# Patient Record
Sex: MALE | Race: WHITE | Employment: FULL TIME | URBAN - METROPOLITAN AREA
[De-identification: names, ages, dates, MRNs, and addresses within clinical notes are randomized per-mention and may not be internally consistent; named-entity substitution may affect disease eponyms.]

---

## 2017-01-06 RX ORDER — DIAZEPAM 10 MG/1
TABLET ORAL
Qty: 30 TABLET | Refills: 0 | OUTPATIENT
Start: 2017-01-06 | End: 2017-02-02 | Stop reason: SDUPTHER

## 2017-01-06 RX ORDER — IBUPROFEN 600 MG/1
TABLET ORAL
Qty: 120 TABLET | Refills: 0 | Status: SHIPPED | OUTPATIENT
Start: 2017-01-06 | End: 2017-02-15 | Stop reason: SDUPTHER

## 2017-02-03 RX ORDER — DIAZEPAM 10 MG/1
TABLET ORAL
Qty: 30 TABLET | Refills: 0 | OUTPATIENT
Start: 2017-02-03 | End: 2017-02-28 | Stop reason: SDUPTHER

## 2017-02-15 RX ORDER — IBUPROFEN 600 MG/1
TABLET ORAL
Qty: 120 TABLET | Refills: 0 | Status: SHIPPED | OUTPATIENT
Start: 2017-02-15 | End: 2017-04-03 | Stop reason: SDUPTHER

## 2017-02-28 RX ORDER — DIAZEPAM 10 MG/1
TABLET ORAL
Qty: 30 TABLET | Refills: 0 | OUTPATIENT
Start: 2017-02-28 | End: 2017-03-24 | Stop reason: SDUPTHER

## 2017-03-21 ENCOUNTER — OFFICE VISIT (OUTPATIENT)
Dept: INTERNAL MEDICINE | Age: 62
End: 2017-03-21

## 2017-03-21 VITALS
WEIGHT: 213 LBS | HEART RATE: 76 BPM | BODY MASS INDEX: 29.71 KG/M2 | DIASTOLIC BLOOD PRESSURE: 70 MMHG | SYSTOLIC BLOOD PRESSURE: 124 MMHG | RESPIRATION RATE: 16 BRPM

## 2017-03-21 DIAGNOSIS — Z79.4 TYPE 2 DIABETES MELLITUS WITHOUT COMPLICATION, WITH LONG-TERM CURRENT USE OF INSULIN (HCC): ICD-10-CM

## 2017-03-21 DIAGNOSIS — E78.00 HYPERCHOLESTEROLEMIA: ICD-10-CM

## 2017-03-21 DIAGNOSIS — I10 ESSENTIAL HYPERTENSION: Primary | ICD-10-CM

## 2017-03-21 DIAGNOSIS — E11.9 TYPE 2 DIABETES MELLITUS WITHOUT COMPLICATION, WITH LONG-TERM CURRENT USE OF INSULIN (HCC): ICD-10-CM

## 2017-03-21 DIAGNOSIS — L57.0 AK (ACTINIC KERATOSIS): ICD-10-CM

## 2017-03-21 DIAGNOSIS — Z00.00 PREVENTATIVE HEALTH CARE: ICD-10-CM

## 2017-03-21 LAB
A/G RATIO: 1.7 (ref 1.1–2.2)
ALBUMIN SERPL-MCNC: 4.5 G/DL (ref 3.4–5)
ALP BLD-CCNC: 92 U/L (ref 40–129)
ALT SERPL-CCNC: 26 U/L (ref 10–40)
ANION GAP SERPL CALCULATED.3IONS-SCNC: 19 MMOL/L (ref 3–16)
AST SERPL-CCNC: 25 U/L (ref 15–37)
BILIRUB SERPL-MCNC: 0.5 MG/DL (ref 0–1)
BUN BLDV-MCNC: 14 MG/DL (ref 7–20)
CALCIUM SERPL-MCNC: 10.1 MG/DL (ref 8.3–10.6)
CHLORIDE BLD-SCNC: 100 MMOL/L (ref 99–110)
CHOLESTEROL, TOTAL: 137 MG/DL (ref 0–199)
CO2: 23 MMOL/L (ref 21–32)
CREAT SERPL-MCNC: 0.8 MG/DL (ref 0.8–1.3)
GFR AFRICAN AMERICAN: >60
GFR NON-AFRICAN AMERICAN: >60
GLOBULIN: 2.7 G/DL
GLUCOSE BLD-MCNC: 87 MG/DL (ref 70–99)
HCT VFR BLD CALC: 47 % (ref 40.5–52.5)
HDLC SERPL-MCNC: 40 MG/DL (ref 40–60)
HEMOGLOBIN: 16.4 G/DL (ref 13.5–17.5)
HEPATITIS C ANTIBODY INTERPRETATION: NORMAL
LDL CHOLESTEROL CALCULATED: 44 MG/DL
MCH RBC QN AUTO: 31.9 PG (ref 26–34)
MCHC RBC AUTO-ENTMCNC: 35 G/DL (ref 31–36)
MCV RBC AUTO: 91.2 FL (ref 80–100)
PDW BLD-RTO: 13.4 % (ref 12.4–15.4)
PLATELET # BLD: 310 K/UL (ref 135–450)
PMV BLD AUTO: 8.2 FL (ref 5–10.5)
POTASSIUM SERPL-SCNC: 4.5 MMOL/L (ref 3.5–5.1)
RBC # BLD: 5.15 M/UL (ref 4.2–5.9)
SODIUM BLD-SCNC: 142 MMOL/L (ref 136–145)
TOTAL PROTEIN: 7.2 G/DL (ref 6.4–8.2)
TRIGL SERPL-MCNC: 263 MG/DL (ref 0–150)
VLDLC SERPL CALC-MCNC: 53 MG/DL
WBC # BLD: 9.1 K/UL (ref 4–11)

## 2017-03-21 PROCEDURE — 17000 DESTRUCT PREMALG LESION: CPT | Performed by: INTERNAL MEDICINE

## 2017-03-21 PROCEDURE — 99214 OFFICE O/P EST MOD 30 MIN: CPT | Performed by: INTERNAL MEDICINE

## 2017-03-21 PROCEDURE — 36415 COLL VENOUS BLD VENIPUNCTURE: CPT | Performed by: INTERNAL MEDICINE

## 2017-03-21 ASSESSMENT — ENCOUNTER SYMPTOMS
WHEEZING: 0
SORE THROAT: 0
GASTROINTESTINAL NEGATIVE: 1
RHINORRHEA: 0

## 2017-03-22 LAB
ESTIMATED AVERAGE GLUCOSE: 168.6 MG/DL
HBA1C MFR BLD: 7.5 %

## 2017-03-24 RX ORDER — DIAZEPAM 10 MG/1
TABLET ORAL
Qty: 30 TABLET | Refills: 0 | OUTPATIENT
Start: 2017-03-24 | End: 2017-04-21 | Stop reason: SDUPTHER

## 2017-04-04 RX ORDER — IBUPROFEN 600 MG/1
TABLET ORAL
Qty: 120 TABLET | Refills: 0 | Status: SHIPPED | OUTPATIENT
Start: 2017-04-04 | End: 2017-05-19 | Stop reason: SDUPTHER

## 2017-04-21 RX ORDER — DIAZEPAM 10 MG/1
TABLET ORAL
Qty: 30 TABLET | Refills: 0 | Status: SHIPPED | OUTPATIENT
Start: 2017-04-21 | End: 2017-05-19 | Stop reason: SDUPTHER

## 2017-04-25 RX ORDER — LISINOPRIL 20 MG/1
TABLET ORAL
Qty: 30 TABLET | Refills: 5 | Status: SHIPPED | OUTPATIENT
Start: 2017-04-25 | End: 2017-09-19 | Stop reason: SDUPTHER

## 2017-05-19 RX ORDER — DIAZEPAM 10 MG/1
TABLET ORAL
Qty: 30 TABLET | Refills: 5 | OUTPATIENT
Start: 2017-05-19 | End: 2017-09-19 | Stop reason: SDUPTHER

## 2017-05-19 RX ORDER — IBUPROFEN 600 MG/1
TABLET ORAL
Qty: 120 TABLET | Refills: 0 | Status: SHIPPED | OUTPATIENT
Start: 2017-05-19 | End: 2017-07-03 | Stop reason: SDUPTHER

## 2017-07-20 RX ORDER — ATORVASTATIN CALCIUM 40 MG/1
TABLET, FILM COATED ORAL
Qty: 30 TABLET | Refills: 0 | Status: SHIPPED | OUTPATIENT
Start: 2017-07-20 | End: 2017-08-21 | Stop reason: SDUPTHER

## 2017-08-04 RX ORDER — FENOFIBRIC ACID 135 MG/1
135 CAPSULE, DELAYED RELEASE ORAL DAILY
Qty: 30 CAPSULE | Refills: 0 | Status: SHIPPED | OUTPATIENT
Start: 2017-08-04 | End: 2017-09-05 | Stop reason: SDUPTHER

## 2017-09-19 ENCOUNTER — OFFICE VISIT (OUTPATIENT)
Dept: INTERNAL MEDICINE | Age: 62
End: 2017-09-19

## 2017-09-19 VITALS
SYSTOLIC BLOOD PRESSURE: 118 MMHG | WEIGHT: 212 LBS | HEART RATE: 96 BPM | DIASTOLIC BLOOD PRESSURE: 74 MMHG | BODY MASS INDEX: 29.57 KG/M2 | RESPIRATION RATE: 16 BRPM

## 2017-09-19 DIAGNOSIS — E78.00 HYPERCHOLESTEROLEMIA: ICD-10-CM

## 2017-09-19 DIAGNOSIS — Z79.4 TYPE 2 DIABETES MELLITUS WITHOUT COMPLICATION, WITH LONG-TERM CURRENT USE OF INSULIN (HCC): Primary | ICD-10-CM

## 2017-09-19 DIAGNOSIS — Z23 NEED FOR TD VACCINE: ICD-10-CM

## 2017-09-19 DIAGNOSIS — E11.9 TYPE 2 DIABETES MELLITUS WITHOUT COMPLICATION, WITH LONG-TERM CURRENT USE OF INSULIN (HCC): Primary | ICD-10-CM

## 2017-09-19 DIAGNOSIS — I10 ESSENTIAL HYPERTENSION: ICD-10-CM

## 2017-09-19 LAB
A/G RATIO: 1.4 (ref 1.1–2.2)
ALBUMIN SERPL-MCNC: 4.6 G/DL (ref 3.4–5)
ALP BLD-CCNC: 90 U/L (ref 40–129)
ALT SERPL-CCNC: 34 U/L (ref 10–40)
ANION GAP SERPL CALCULATED.3IONS-SCNC: 23 MMOL/L (ref 3–16)
AST SERPL-CCNC: 34 U/L (ref 15–37)
BILIRUB SERPL-MCNC: 0.5 MG/DL (ref 0–1)
BUN BLDV-MCNC: 14 MG/DL (ref 7–20)
CALCIUM SERPL-MCNC: 10.3 MG/DL (ref 8.3–10.6)
CHLORIDE BLD-SCNC: 98 MMOL/L (ref 99–110)
CHOLESTEROL, TOTAL: 128 MG/DL (ref 0–199)
CO2: 19 MMOL/L (ref 21–32)
CREAT SERPL-MCNC: 0.8 MG/DL (ref 0.8–1.3)
ESTIMATED AVERAGE GLUCOSE: 191.5 MG/DL
GFR AFRICAN AMERICAN: >60
GFR NON-AFRICAN AMERICAN: >60
GLOBULIN: 3.3 G/DL
GLUCOSE BLD-MCNC: 169 MG/DL (ref 70–99)
HBA1C MFR BLD: 8.3 %
HDLC SERPL-MCNC: 37 MG/DL (ref 40–60)
LDL CHOLESTEROL CALCULATED: ABNORMAL MG/DL
LDL CHOLESTEROL DIRECT: 55 MG/DL
POTASSIUM SERPL-SCNC: 5 MMOL/L (ref 3.5–5.1)
SODIUM BLD-SCNC: 140 MMOL/L (ref 136–145)
TOTAL PROTEIN: 7.9 G/DL (ref 6.4–8.2)
TRIGL SERPL-MCNC: 305 MG/DL (ref 0–150)
VLDLC SERPL CALC-MCNC: ABNORMAL MG/DL

## 2017-09-19 PROCEDURE — 90714 TD VACC NO PRESV 7 YRS+ IM: CPT | Performed by: INTERNAL MEDICINE

## 2017-09-19 PROCEDURE — 99214 OFFICE O/P EST MOD 30 MIN: CPT | Performed by: INTERNAL MEDICINE

## 2017-09-19 PROCEDURE — 90471 IMMUNIZATION ADMIN: CPT | Performed by: INTERNAL MEDICINE

## 2017-09-19 PROCEDURE — 36415 COLL VENOUS BLD VENIPUNCTURE: CPT | Performed by: INTERNAL MEDICINE

## 2017-09-19 RX ORDER — LISINOPRIL 20 MG/1
TABLET ORAL
Qty: 90 TABLET | Refills: 0 | Status: SHIPPED | OUTPATIENT
Start: 2017-09-19 | End: 2018-01-22 | Stop reason: SDUPTHER

## 2017-09-19 RX ORDER — ATORVASTATIN CALCIUM 40 MG/1
TABLET, FILM COATED ORAL
Qty: 90 TABLET | Refills: 0 | Status: SHIPPED | OUTPATIENT
Start: 2017-09-19 | End: 2018-03-26 | Stop reason: SDUPTHER

## 2017-09-19 RX ORDER — DIAZEPAM 10 MG/1
TABLET ORAL
Qty: 90 TABLET | Refills: 0 | Status: SHIPPED | OUTPATIENT
Start: 2017-09-19 | End: 2017-11-16 | Stop reason: SDUPTHER

## 2017-09-19 RX ORDER — VARDENAFIL HYDROCHLORIDE 20 MG/1
TABLET ORAL
Qty: 10 TABLET | Refills: 0 | Status: SHIPPED | OUTPATIENT
Start: 2017-09-19 | End: 2018-02-21 | Stop reason: SDUPTHER

## 2017-09-19 RX ORDER — FLUOXETINE 10 MG/1
CAPSULE ORAL
Qty: 270 CAPSULE | Refills: 0 | Status: SHIPPED | OUTPATIENT
Start: 2017-09-19 | End: 2018-02-21 | Stop reason: SDUPTHER

## 2017-09-19 RX ORDER — BUPROPION HYDROCHLORIDE 300 MG/1
300 TABLET ORAL EVERY MORNING
Qty: 90 TABLET | Refills: 0 | Status: SHIPPED | OUTPATIENT
Start: 2017-09-19 | End: 2018-05-24

## 2017-09-19 RX ORDER — FENOFIBRIC ACID 135 MG/1
CAPSULE, DELAYED RELEASE ORAL
Qty: 90 CAPSULE | Refills: 0 | Status: SHIPPED | OUTPATIENT
Start: 2017-09-19 | End: 2018-02-05 | Stop reason: SDUPTHER

## 2017-09-19 ASSESSMENT — ENCOUNTER SYMPTOMS
WHEEZING: 0
RHINORRHEA: 0
GASTROINTESTINAL NEGATIVE: 1
SORE THROAT: 0

## 2017-09-19 ASSESSMENT — PATIENT HEALTH QUESTIONNAIRE - PHQ9
2. FEELING DOWN, DEPRESSED OR HOPELESS: 0
SUM OF ALL RESPONSES TO PHQ QUESTIONS 1-9: 0
SUM OF ALL RESPONSES TO PHQ9 QUESTIONS 1 & 2: 0
1. LITTLE INTEREST OR PLEASURE IN DOING THINGS: 0

## 2017-11-15 ENCOUNTER — OFFICE VISIT (OUTPATIENT)
Dept: INTERNAL MEDICINE | Age: 62
End: 2017-11-15

## 2017-11-15 VITALS
WEIGHT: 209 LBS | OXYGEN SATURATION: 98 % | HEART RATE: 110 BPM | SYSTOLIC BLOOD PRESSURE: 130 MMHG | DIASTOLIC BLOOD PRESSURE: 72 MMHG | BODY MASS INDEX: 28.31 KG/M2 | HEIGHT: 72 IN

## 2017-11-15 DIAGNOSIS — F33.1 MODERATE EPISODE OF RECURRENT MAJOR DEPRESSIVE DISORDER (HCC): ICD-10-CM

## 2017-11-15 DIAGNOSIS — E11.9 TYPE 2 DIABETES MELLITUS WITHOUT COMPLICATION, WITH LONG-TERM CURRENT USE OF INSULIN (HCC): Primary | ICD-10-CM

## 2017-11-15 DIAGNOSIS — Z79.4 TYPE 2 DIABETES MELLITUS WITHOUT COMPLICATION, WITH LONG-TERM CURRENT USE OF INSULIN (HCC): Primary | ICD-10-CM

## 2017-11-15 DIAGNOSIS — E78.00 HYPERCHOLESTEROLEMIA: ICD-10-CM

## 2017-11-15 DIAGNOSIS — F41.1 ANXIETY STATE: ICD-10-CM

## 2017-11-15 DIAGNOSIS — Z72.0 TOBACCO ABUSE: ICD-10-CM

## 2017-11-15 PROBLEM — L57.0 AK (ACTINIC KERATOSIS): Status: RESOLVED | Noted: 2017-03-21 | Resolved: 2017-11-15

## 2017-11-15 PROCEDURE — 99214 OFFICE O/P EST MOD 30 MIN: CPT | Performed by: INTERNAL MEDICINE

## 2017-11-15 PROCEDURE — G8484 FLU IMMUNIZE NO ADMIN: HCPCS | Performed by: INTERNAL MEDICINE

## 2017-11-15 PROCEDURE — G8427 DOCREV CUR MEDS BY ELIG CLIN: HCPCS | Performed by: INTERNAL MEDICINE

## 2017-11-15 PROCEDURE — 3017F COLORECTAL CA SCREEN DOC REV: CPT | Performed by: INTERNAL MEDICINE

## 2017-11-15 PROCEDURE — 3045F PR MOST RECENT HEMOGLOBIN A1C LEVEL 7.0-9.0%: CPT | Performed by: INTERNAL MEDICINE

## 2017-11-15 PROCEDURE — 4004F PT TOBACCO SCREEN RCVD TLK: CPT | Performed by: INTERNAL MEDICINE

## 2017-11-15 PROCEDURE — G8419 CALC BMI OUT NRM PARAM NOF/U: HCPCS | Performed by: INTERNAL MEDICINE

## 2017-11-15 RX ORDER — IBUPROFEN 600 MG/1
600 TABLET ORAL EVERY 6 HOURS PRN
Qty: 120 TABLET | Refills: 1 | Status: SHIPPED | OUTPATIENT
Start: 2017-11-15 | End: 2018-02-05 | Stop reason: SDUPTHER

## 2017-11-15 ASSESSMENT — ENCOUNTER SYMPTOMS
RESPIRATORY NEGATIVE: 1
WHEEZING: 0
GASTROINTESTINAL NEGATIVE: 1
CHEST TIGHTNESS: 0
SHORTNESS OF BREATH: 0

## 2017-11-15 NOTE — PROGRESS NOTES
Subjective:      Patient ID: Jared Kam is a 60 y.o. y.o. male. HPI    Patient is here for a new patient visit    Past Medical History:   Diagnosis Date    Anal cancer ([facility]     Anxiety     Depression     Hyperlipidemia     Lumbar disc disorder     Herniation    Type 2 diabetes mellitus without complication ([facility] [facility]        Past Surgical History:   Procedure Laterality Date    COLONOSCOPY  1/2012    rectal cancer and hemorrhoidectomy     COLONOSCOPY  10/30/2015    Dr. Bishop Gone, Polyps, mild diverticulosis, hemorrhoids, colon polyps Transverse, descending- 3 Years    HAND SURGERY  1972    Industrial accident   [address] [place] [address]       Social History     Social History    Marital status:      Spouse name: Juli    Number of children: 3    Years of education: 16     Occupational History          Social History Main Topics    Smoking status: Current Every Day Smoker     Packs/day: 1.00     Years: 38.00     Types: Cigarettes    Smokeless tobacco: Never Used    Alcohol use 0.0 oz/week      Comment: Occasional beer    Drug use: No    Sexual activity: Yes     Partners: Female     Other Topics Concern    Not on file     Social History Narrative    No narrative on file       Family History   Problem Relation Age of Onset    Adopted: Yes       Vitals:    11/15/17 1411   BP: 130/72   Pulse: 110   SpO2: 98%       Wt Readings from Last 3 Encounters:   11/15/17 209 lb (94.8 kg)   09/19/17 212 lb (96.2 kg)   03/21/17 213 lb (96.6 kg)     He has not been checking his blood sugar. Patient is taking their cholesterol medication and watching diet without problem. He has chronic back pain. He takes Motrin for it prn. He takes Prozac and Wellbutrin for anxiety and depression. He takes Valium daily. Review of Systems   Constitutional: Negative. HENT: Negative. Respiratory: Negative.   Negative for chest tightness, shortness of breath and

## 2017-11-16 ENCOUNTER — TELEPHONE (OUTPATIENT)
Dept: INTERNAL MEDICINE | Age: 62
End: 2017-11-16

## 2017-11-16 RX ORDER — DIAZEPAM 10 MG/1
10 TABLET ORAL DAILY PRN
Qty: 30 TABLET | Refills: 2 | Status: SHIPPED | OUTPATIENT
Start: 2017-11-16 | End: 2018-02-05 | Stop reason: SDUPTHER

## 2017-11-24 DIAGNOSIS — E11.9 TYPE 2 DIABETES MELLITUS WITHOUT COMPLICATION, WITH LONG-TERM CURRENT USE OF INSULIN (HCC): ICD-10-CM

## 2017-11-24 DIAGNOSIS — E78.00 HYPERCHOLESTEROLEMIA: ICD-10-CM

## 2017-11-24 DIAGNOSIS — Z79.4 TYPE 2 DIABETES MELLITUS WITHOUT COMPLICATION, WITH LONG-TERM CURRENT USE OF INSULIN (HCC): ICD-10-CM

## 2017-11-24 LAB
A/G RATIO: 1.4 (ref 1.1–2.2)
ALBUMIN SERPL-MCNC: 4.3 G/DL (ref 3.4–5)
ALP BLD-CCNC: 87 U/L (ref 40–129)
ALT SERPL-CCNC: 34 U/L (ref 10–40)
ANION GAP SERPL CALCULATED.3IONS-SCNC: 16 MMOL/L (ref 3–16)
AST SERPL-CCNC: 35 U/L (ref 15–37)
BASOPHILS ABSOLUTE: 0 K/UL (ref 0–0.2)
BASOPHILS RELATIVE PERCENT: 0.4 %
BILIRUB SERPL-MCNC: 0.6 MG/DL (ref 0–1)
BUN BLDV-MCNC: 13 MG/DL (ref 7–20)
CALCIUM SERPL-MCNC: 9.9 MG/DL (ref 8.3–10.6)
CHLORIDE BLD-SCNC: 98 MMOL/L (ref 99–110)
CHOLESTEROL, TOTAL: 140 MG/DL (ref 0–199)
CO2: 23 MMOL/L (ref 21–32)
CREAT SERPL-MCNC: 0.8 MG/DL (ref 0.8–1.3)
EOSINOPHILS ABSOLUTE: 0.3 K/UL (ref 0–0.6)
EOSINOPHILS RELATIVE PERCENT: 3.6 %
GFR AFRICAN AMERICAN: >60
GFR NON-AFRICAN AMERICAN: >60
GLOBULIN: 3 G/DL
GLUCOSE BLD-MCNC: 172 MG/DL (ref 70–99)
HCT VFR BLD CALC: 48.2 % (ref 40.5–52.5)
HDLC SERPL-MCNC: 38 MG/DL (ref 40–60)
HEMOGLOBIN: 16.8 G/DL (ref 13.5–17.5)
LDL CHOLESTEROL CALCULATED: 48 MG/DL
LYMPHOCYTES ABSOLUTE: 1.6 K/UL (ref 1–5.1)
LYMPHOCYTES RELATIVE PERCENT: 21.1 %
MCH RBC QN AUTO: 32.4 PG (ref 26–34)
MCHC RBC AUTO-ENTMCNC: 34.7 G/DL (ref 31–36)
MCV RBC AUTO: 93.2 FL (ref 80–100)
MONOCYTES ABSOLUTE: 0.7 K/UL (ref 0–1.3)
MONOCYTES RELATIVE PERCENT: 10.1 %
NEUTROPHILS ABSOLUTE: 4.8 K/UL (ref 1.7–7.7)
NEUTROPHILS RELATIVE PERCENT: 64.8 %
PDW BLD-RTO: 13 % (ref 12.4–15.4)
PLATELET # BLD: 284 K/UL (ref 135–450)
PMV BLD AUTO: 7.9 FL (ref 5–10.5)
POTASSIUM SERPL-SCNC: 4.6 MMOL/L (ref 3.5–5.1)
RBC # BLD: 5.17 M/UL (ref 4.2–5.9)
SODIUM BLD-SCNC: 137 MMOL/L (ref 136–145)
TOTAL PROTEIN: 7.3 G/DL (ref 6.4–8.2)
TRIGL SERPL-MCNC: 271 MG/DL (ref 0–150)
VLDLC SERPL CALC-MCNC: 54 MG/DL
WBC # BLD: 7.4 K/UL (ref 4–11)

## 2017-11-25 LAB
ESTIMATED AVERAGE GLUCOSE: 200.1 MG/DL
HBA1C MFR BLD: 8.6 %

## 2017-12-01 ENCOUNTER — TELEPHONE (OUTPATIENT)
Dept: INTERNAL MEDICINE | Age: 62
End: 2017-12-01

## 2018-01-22 RX ORDER — LANCETS 30 GAUGE
EACH MISCELLANEOUS
Qty: 100 EACH | Refills: 3 | Status: SHIPPED | OUTPATIENT
Start: 2018-01-22

## 2018-01-22 RX ORDER — LISINOPRIL 20 MG/1
TABLET ORAL
Qty: 90 TABLET | Refills: 0 | Status: SHIPPED | OUTPATIENT
Start: 2018-01-22 | End: 2018-04-27 | Stop reason: SDUPTHER

## 2018-02-05 RX ORDER — DIAZEPAM 10 MG/1
10 TABLET ORAL DAILY PRN
Qty: 30 TABLET | Refills: 0 | Status: SHIPPED | OUTPATIENT
Start: 2018-02-05 | End: 2018-03-07 | Stop reason: SDUPTHER

## 2018-02-05 RX ORDER — IBUPROFEN 600 MG/1
600 TABLET ORAL EVERY 6 HOURS PRN
Qty: 120 TABLET | Refills: 1 | Status: SHIPPED | OUTPATIENT
Start: 2018-02-05 | End: 2018-05-04 | Stop reason: SDUPTHER

## 2018-02-05 RX ORDER — FENOFIBRIC ACID 135 MG/1
CAPSULE, DELAYED RELEASE ORAL
Qty: 90 CAPSULE | Refills: 0 | Status: SHIPPED | OUTPATIENT
Start: 2018-02-05 | End: 2018-05-04 | Stop reason: SDUPTHER

## 2018-02-14 ENCOUNTER — TELEPHONE (OUTPATIENT)
Dept: INTERNAL MEDICINE | Age: 63
End: 2018-02-14

## 2018-02-14 DIAGNOSIS — N18.2 DIABETES MELLITUS DUE TO UNDERLYING CONDITION WITH STAGE 2 CHRONIC KIDNEY DISEASE, WITHOUT LONG-TERM CURRENT USE OF INSULIN (HCC): Primary | ICD-10-CM

## 2018-02-14 DIAGNOSIS — E08.22 DIABETES MELLITUS DUE TO UNDERLYING CONDITION WITH STAGE 2 CHRONIC KIDNEY DISEASE, WITHOUT LONG-TERM CURRENT USE OF INSULIN (HCC): Primary | ICD-10-CM

## 2018-02-21 ENCOUNTER — OFFICE VISIT (OUTPATIENT)
Dept: INTERNAL MEDICINE | Age: 63
End: 2018-02-21

## 2018-02-21 VITALS
HEIGHT: 72 IN | SYSTOLIC BLOOD PRESSURE: 130 MMHG | DIASTOLIC BLOOD PRESSURE: 74 MMHG | OXYGEN SATURATION: 98 % | HEART RATE: 80 BPM | BODY MASS INDEX: 28.17 KG/M2 | WEIGHT: 208 LBS

## 2018-02-21 DIAGNOSIS — E11.9 TYPE 2 DIABETES MELLITUS WITHOUT COMPLICATION, WITH LONG-TERM CURRENT USE OF INSULIN (HCC): Primary | ICD-10-CM

## 2018-02-21 DIAGNOSIS — E11.9 TYPE 2 DIABETES MELLITUS WITHOUT COMPLICATION, WITH LONG-TERM CURRENT USE OF INSULIN (HCC): ICD-10-CM

## 2018-02-21 DIAGNOSIS — Z79.4 TYPE 2 DIABETES MELLITUS WITHOUT COMPLICATION, WITH LONG-TERM CURRENT USE OF INSULIN (HCC): Primary | ICD-10-CM

## 2018-02-21 DIAGNOSIS — I10 ESSENTIAL HYPERTENSION: ICD-10-CM

## 2018-02-21 DIAGNOSIS — Z79.4 TYPE 2 DIABETES MELLITUS WITHOUT COMPLICATION, WITH LONG-TERM CURRENT USE OF INSULIN (HCC): ICD-10-CM

## 2018-02-21 DIAGNOSIS — F41.1 ANXIETY STATE: ICD-10-CM

## 2018-02-21 DIAGNOSIS — E78.00 HYPERCHOLESTEROLEMIA: ICD-10-CM

## 2018-02-21 DIAGNOSIS — F33.1 MODERATE EPISODE OF RECURRENT MAJOR DEPRESSIVE DISORDER (HCC): ICD-10-CM

## 2018-02-21 DIAGNOSIS — Z72.89 OTHER PROBLEMS RELATED TO LIFESTYLE: ICD-10-CM

## 2018-02-21 LAB
A/G RATIO: 1.8 (ref 1.1–2.2)
ALBUMIN SERPL-MCNC: 5.1 G/DL (ref 3.4–5)
ALP BLD-CCNC: 85 U/L (ref 40–129)
ALT SERPL-CCNC: 28 U/L (ref 10–40)
ANION GAP SERPL CALCULATED.3IONS-SCNC: 17 MMOL/L (ref 3–16)
AST SERPL-CCNC: 25 U/L (ref 15–37)
BILIRUB SERPL-MCNC: 0.6 MG/DL (ref 0–1)
BILIRUBIN URINE: NEGATIVE
BLOOD, URINE: NEGATIVE
BUN BLDV-MCNC: 14 MG/DL (ref 7–20)
CALCIUM SERPL-MCNC: 10.4 MG/DL (ref 8.3–10.6)
CHLORIDE BLD-SCNC: 99 MMOL/L (ref 99–110)
CHOLESTEROL, TOTAL: 144 MG/DL (ref 0–199)
CLARITY: CLEAR
CO2: 24 MMOL/L (ref 21–32)
COLOR: YELLOW
CREAT SERPL-MCNC: 0.9 MG/DL (ref 0.8–1.3)
EPITHELIAL CELLS, UA: 1 /HPF (ref 0–5)
GFR AFRICAN AMERICAN: >60
GFR NON-AFRICAN AMERICAN: >60
GLOBULIN: 2.8 G/DL
GLUCOSE BLD-MCNC: 114 MG/DL (ref 70–99)
GLUCOSE URINE: NEGATIVE MG/DL
HDLC SERPL-MCNC: 39 MG/DL (ref 40–60)
HYALINE CASTS: 1 /LPF (ref 0–8)
KETONES, URINE: NEGATIVE MG/DL
LDL CHOLESTEROL CALCULATED: 51 MG/DL
LEUKOCYTE ESTERASE, URINE: NEGATIVE
MICROSCOPIC EXAMINATION: NORMAL
NITRITE, URINE: NEGATIVE
PH UA: 6
POTASSIUM SERPL-SCNC: 4.6 MMOL/L (ref 3.5–5.1)
PROTEIN UA: NEGATIVE MG/DL
RBC UA: 3 /HPF (ref 0–4)
REASON FOR REJECTION: NORMAL
REJECTED TEST: NORMAL
SODIUM BLD-SCNC: 140 MMOL/L (ref 136–145)
SPECIFIC GRAVITY UA: 1.01
TOTAL PROTEIN: 7.9 G/DL (ref 6.4–8.2)
TRIGL SERPL-MCNC: 272 MG/DL (ref 0–150)
UROBILINOGEN, URINE: 0.2 E.U./DL
VLDLC SERPL CALC-MCNC: 54 MG/DL
WBC UA: 1 /HPF (ref 0–5)

## 2018-02-21 PROCEDURE — 99214 OFFICE O/P EST MOD 30 MIN: CPT | Performed by: INTERNAL MEDICINE

## 2018-02-21 RX ORDER — FLUOXETINE 10 MG/1
CAPSULE ORAL
Qty: 270 CAPSULE | Refills: 1 | Status: SHIPPED | OUTPATIENT
Start: 2018-02-21 | End: 2018-08-23 | Stop reason: SDUPTHER

## 2018-02-21 RX ORDER — VARDENAFIL HYDROCHLORIDE 20 MG/1
TABLET ORAL
Qty: 10 TABLET | Refills: 0 | Status: SHIPPED | OUTPATIENT
Start: 2018-02-21

## 2018-02-21 ASSESSMENT — ENCOUNTER SYMPTOMS
WHEEZING: 0
GASTROINTESTINAL NEGATIVE: 1
SHORTNESS OF BREATH: 0
CHEST TIGHTNESS: 0
RESPIRATORY NEGATIVE: 1

## 2018-02-21 NOTE — PROGRESS NOTES
Patient Self-Management Goal for Chronic Condition  Goal: I will take all medications as prescribed by my doctor, and I will call the office if I am having any medication problems.   Barriers to success: none  Plan for overcoming my barriers: N/A     Confidence: 10/10  Date goal set: 2/21/18  Date goal attained:

## 2018-02-22 ENCOUNTER — TELEPHONE (OUTPATIENT)
Dept: INTERNAL MEDICINE | Age: 63
End: 2018-02-22

## 2018-02-22 LAB
ESTIMATED AVERAGE GLUCOSE: 168.6 MG/DL
HBA1C MFR BLD: 7.5 %
HIV AG/AB: NORMAL
HIV ANTIGEN: NORMAL
HIV-1 ANTIBODY: NORMAL
HIV-2 AB: NORMAL

## 2018-02-22 NOTE — TELEPHONE ENCOUNTER
[facility] lab did not receive the correct specimen for a Micro-Albumin and Creatinine urine

## 2018-03-07 ENCOUNTER — TELEPHONE (OUTPATIENT)
Dept: INTERNAL MEDICINE | Age: 63
End: 2018-03-07

## 2018-03-08 RX ORDER — DIAZEPAM 10 MG/1
10 TABLET ORAL DAILY PRN
Qty: 30 TABLET | Refills: 0 | Status: SHIPPED | OUTPATIENT
Start: 2018-03-08 | End: 2018-04-04 | Stop reason: SDUPTHER

## 2018-03-16 ENCOUNTER — TELEPHONE (OUTPATIENT)
Dept: INTERNAL MEDICINE | Age: 63
End: 2018-03-16

## 2018-03-26 ENCOUNTER — TELEPHONE (OUTPATIENT)
Dept: INTERNAL MEDICINE | Age: 63
End: 2018-03-26

## 2018-03-26 RX ORDER — ATORVASTATIN CALCIUM 40 MG/1
TABLET, FILM COATED ORAL
Qty: 90 TABLET | Refills: 0 | Status: SHIPPED | OUTPATIENT
Start: 2018-03-26 | End: 2018-06-26 | Stop reason: SDUPTHER

## 2018-04-04 RX ORDER — DIAZEPAM 10 MG/1
10 TABLET ORAL DAILY PRN
Qty: 30 TABLET | Refills: 0 | Status: SHIPPED | OUTPATIENT
Start: 2018-04-04 | End: 2018-04-06

## 2018-04-06 RX ORDER — DIAZEPAM 10 MG/1
TABLET ORAL
Qty: 30 TABLET | Refills: 0 | Status: SHIPPED | OUTPATIENT
Start: 2018-04-06 | End: 2018-05-04 | Stop reason: SDUPTHER

## 2018-04-17 ENCOUNTER — TELEPHONE (OUTPATIENT)
Dept: INTERNAL MEDICINE | Age: 63
End: 2018-04-17

## 2018-04-27 RX ORDER — LISINOPRIL 20 MG/1
TABLET ORAL
Qty: 90 TABLET | Refills: 0 | Status: SHIPPED | OUTPATIENT
Start: 2018-04-27 | End: 2018-07-26 | Stop reason: SDUPTHER

## 2018-05-04 RX ORDER — IBUPROFEN 600 MG/1
600 TABLET ORAL EVERY 6 HOURS PRN
Qty: 120 TABLET | Refills: 1 | Status: SHIPPED | OUTPATIENT
Start: 2018-05-04 | End: 2018-07-02 | Stop reason: SDUPTHER

## 2018-05-04 RX ORDER — DIAZEPAM 10 MG/1
TABLET ORAL
Qty: 30 TABLET | Refills: 0 | Status: SHIPPED | OUTPATIENT
Start: 2018-05-04 | End: 2018-06-04

## 2018-05-04 RX ORDER — FENOFIBRIC ACID 135 MG/1
CAPSULE, DELAYED RELEASE ORAL
Qty: 90 CAPSULE | Refills: 0 | Status: SHIPPED | OUTPATIENT
Start: 2018-05-04 | End: 2018-08-02 | Stop reason: SDUPTHER

## 2018-05-24 ENCOUNTER — OFFICE VISIT (OUTPATIENT)
Dept: INTERNAL MEDICINE | Age: 63
End: 2018-05-24

## 2018-05-24 VITALS
BODY MASS INDEX: 28.48 KG/M2 | HEART RATE: 83 BPM | WEIGHT: 210 LBS | OXYGEN SATURATION: 94 % | DIASTOLIC BLOOD PRESSURE: 64 MMHG | SYSTOLIC BLOOD PRESSURE: 124 MMHG

## 2018-05-24 DIAGNOSIS — D22.9 ATYPICAL NEVI: ICD-10-CM

## 2018-05-24 DIAGNOSIS — E78.00 HYPERCHOLESTEROLEMIA: ICD-10-CM

## 2018-05-24 DIAGNOSIS — E11.9 TYPE 2 DIABETES MELLITUS WITHOUT COMPLICATION, UNSPECIFIED LONG TERM INSULIN USE STATUS: ICD-10-CM

## 2018-05-24 DIAGNOSIS — Z00.00 ROUTINE GENERAL MEDICAL EXAMINATION AT A HEALTH CARE FACILITY: Primary | ICD-10-CM

## 2018-05-24 DIAGNOSIS — I10 ESSENTIAL HYPERTENSION: ICD-10-CM

## 2018-05-24 DIAGNOSIS — Z72.0 TOBACCO ABUSE: ICD-10-CM

## 2018-05-24 DIAGNOSIS — F17.210 NICOTINE DEPENDENCE, CIGARETTES, UNCOMPLICATED: ICD-10-CM

## 2018-05-24 LAB
BASOPHILS ABSOLUTE: 0 K/UL (ref 0–0.2)
BASOPHILS RELATIVE PERCENT: 0.2 %
BILIRUBIN URINE: NEGATIVE
BLOOD, URINE: NEGATIVE
CLARITY: CLEAR
COLOR: YELLOW
CONTROL: NORMAL
DIABETIC RETINOPATHY: NORMAL
EOSINOPHILS ABSOLUTE: 0.2 K/UL (ref 0–0.6)
EOSINOPHILS RELATIVE PERCENT: 2.1 %
EPITHELIAL CELLS, UA: 0 /HPF (ref 0–5)
GLUCOSE URINE: NEGATIVE MG/DL
HCT VFR BLD CALC: 50.1 % (ref 40.5–52.5)
HEMOCCULT STL QL: NORMAL
HEMOGLOBIN: 17.7 G/DL (ref 13.5–17.5)
HYALINE CASTS: 0 /LPF (ref 0–8)
KETONES, URINE: NEGATIVE MG/DL
LEUKOCYTE ESTERASE, URINE: NEGATIVE
LYMPHOCYTES ABSOLUTE: 1.4 K/UL (ref 1–5.1)
LYMPHOCYTES RELATIVE PERCENT: 12.7 %
MCH RBC QN AUTO: 32.5 PG (ref 26–34)
MCHC RBC AUTO-ENTMCNC: 35.4 G/DL (ref 31–36)
MCV RBC AUTO: 91.7 FL (ref 80–100)
MICROSCOPIC EXAMINATION: NORMAL
MONOCYTES ABSOLUTE: 1 K/UL (ref 0–1.3)
MONOCYTES RELATIVE PERCENT: 9.4 %
NEUTROPHILS ABSOLUTE: 8.1 K/UL (ref 1.7–7.7)
NEUTROPHILS RELATIVE PERCENT: 75.6 %
NITRITE, URINE: NEGATIVE
PDW BLD-RTO: 13.7 % (ref 12.4–15.4)
PH UA: 6.5
PLATELET # BLD: 280 K/UL (ref 135–450)
PMV BLD AUTO: 8 FL (ref 5–10.5)
PROTEIN UA: NEGATIVE MG/DL
RBC # BLD: 5.46 M/UL (ref 4.2–5.9)
RBC UA: 3 /HPF (ref 0–4)
SPECIFIC GRAVITY UA: 1.01
UROBILINOGEN, URINE: 0.2 E.U./DL
WBC # BLD: 10.8 K/UL (ref 4–11)
WBC UA: 1 /HPF (ref 0–5)

## 2018-05-24 PROCEDURE — 99396 PREV VISIT EST AGE 40-64: CPT | Performed by: INTERNAL MEDICINE

## 2018-05-24 PROCEDURE — G0296 VISIT TO DETERM LDCT ELIG: HCPCS | Performed by: INTERNAL MEDICINE

## 2018-05-24 PROCEDURE — 82274 ASSAY TEST FOR BLOOD FECAL: CPT | Performed by: INTERNAL MEDICINE

## 2018-05-24 PROCEDURE — 93000 ELECTROCARDIOGRAM COMPLETE: CPT | Performed by: INTERNAL MEDICINE

## 2018-05-24 RX ORDER — METFORMIN HYDROCHLORIDE 1000 MG/1
2000 TABLET, FILM COATED, EXTENDED RELEASE ORAL
Qty: 60 TABLET | Refills: 3 | Status: SHIPPED | OUTPATIENT
Start: 2018-05-24 | End: 2018-07-05 | Stop reason: SDUPTHER

## 2018-05-24 ASSESSMENT — ENCOUNTER SYMPTOMS
CONSTIPATION: 0
EYE DISCHARGE: 0
BACK PAIN: 0
EYE ITCHING: 0
RECTAL PAIN: 0
DIARRHEA: 0
FACIAL SWELLING: 0
TROUBLE SWALLOWING: 0
EYE REDNESS: 0
EYE PAIN: 0
NAUSEA: 0
RHINORRHEA: 0
WHEEZING: 0
SORE THROAT: 0
ANAL BLEEDING: 0
ABDOMINAL PAIN: 0
VOMITING: 0
VOICE CHANGE: 0
COUGH: 0
PHOTOPHOBIA: 0
SHORTNESS OF BREATH: 0
APNEA: 0
STRIDOR: 0
CHOKING: 0
ABDOMINAL DISTENTION: 0
CHEST TIGHTNESS: 0
SINUS PRESSURE: 0
BLOOD IN STOOL: 0
COLOR CHANGE: 0

## 2018-05-25 LAB
A/G RATIO: 1.8 (ref 1.1–2.2)
ALBUMIN SERPL-MCNC: 5.1 G/DL (ref 3.4–5)
ALP BLD-CCNC: 77 U/L (ref 40–129)
ALT SERPL-CCNC: 28 U/L (ref 10–40)
ANION GAP SERPL CALCULATED.3IONS-SCNC: 18 MMOL/L (ref 3–16)
AST SERPL-CCNC: 27 U/L (ref 15–37)
BILIRUB SERPL-MCNC: 0.7 MG/DL (ref 0–1)
BUN BLDV-MCNC: 12 MG/DL (ref 7–20)
CALCIUM SERPL-MCNC: 10.5 MG/DL (ref 8.3–10.6)
CHLORIDE BLD-SCNC: 99 MMOL/L (ref 99–110)
CHOLESTEROL, TOTAL: 137 MG/DL (ref 0–199)
CO2: 26 MMOL/L (ref 21–32)
CREAT SERPL-MCNC: 0.9 MG/DL (ref 0.8–1.3)
CREATININE URINE: 54.9 MG/DL (ref 39–259)
ESTIMATED AVERAGE GLUCOSE: 168.6 MG/DL
GFR AFRICAN AMERICAN: >60
GFR NON-AFRICAN AMERICAN: >60
GLOBULIN: 2.9 G/DL
GLUCOSE BLD-MCNC: 115 MG/DL (ref 70–99)
HBA1C MFR BLD: 7.5 %
HDLC SERPL-MCNC: 45 MG/DL (ref 40–60)
LDL CHOLESTEROL CALCULATED: 43 MG/DL
MICROALBUMIN UR-MCNC: 2.9 MG/DL
MICROALBUMIN/CREAT UR-RTO: 52.8 MG/G (ref 0–30)
POTASSIUM SERPL-SCNC: 5 MMOL/L (ref 3.5–5.1)
PROSTATE SPECIFIC ANTIGEN: 0.64 NG/ML (ref 0–4)
SODIUM BLD-SCNC: 143 MMOL/L (ref 136–145)
TOTAL PROTEIN: 8 G/DL (ref 6.4–8.2)
TRIGL SERPL-MCNC: 245 MG/DL (ref 0–150)
TSH SERPL DL<=0.05 MIU/L-ACNC: 1.61 UIU/ML (ref 0.27–4.2)
VLDLC SERPL CALC-MCNC: 49 MG/DL

## 2018-05-26 LAB — VITAMIN D 1,25-DIHYDROXY: 60.9 PG/ML (ref 19.9–79.3)

## 2018-05-29 ENCOUNTER — TELEPHONE (OUTPATIENT)
Dept: INTERNAL MEDICINE | Age: 63
End: 2018-05-29

## 2018-06-04 ENCOUNTER — TELEPHONE (OUTPATIENT)
Dept: INTERNAL MEDICINE | Age: 63
End: 2018-06-04

## 2018-06-05 ENCOUNTER — TELEPHONE (OUTPATIENT)
Dept: INTERNAL MEDICINE | Age: 63
End: 2018-06-05

## 2018-06-05 DIAGNOSIS — F41.9 ANXIETY: Primary | ICD-10-CM

## 2018-06-05 RX ORDER — DIAZEPAM 10 MG/1
10 TABLET ORAL EVERY 6 HOURS PRN
Qty: 30 TABLET | Refills: 0 | Status: SHIPPED | OUTPATIENT
Start: 2018-06-05 | End: 2018-07-02 | Stop reason: SDUPTHER

## 2018-06-05 NOTE — TELEPHONE ENCOUNTER
Pt went to [facility]  Didn't feel like he could drive here yesterday  Today he feels better, more hydrated, more energy, has an appetite  Just wanted you to know

## 2018-06-26 ENCOUNTER — TELEPHONE (OUTPATIENT)
Dept: INTERNAL MEDICINE | Age: 63
End: 2018-06-26

## 2018-06-26 RX ORDER — ATORVASTATIN CALCIUM 40 MG/1
TABLET, FILM COATED ORAL
Qty: 90 TABLET | Refills: 0 | Status: SHIPPED | OUTPATIENT
Start: 2018-06-26 | End: 2018-09-25 | Stop reason: SDUPTHER

## 2018-07-02 DIAGNOSIS — F41.9 ANXIETY: ICD-10-CM

## 2018-07-03 RX ORDER — IBUPROFEN 600 MG/1
600 TABLET ORAL EVERY 6 HOURS PRN
Qty: 120 TABLET | Refills: 1 | Status: SHIPPED | OUTPATIENT
Start: 2018-07-03 | End: 2018-08-23 | Stop reason: SDUPTHER

## 2018-07-03 RX ORDER — DIAZEPAM 10 MG/1
10 TABLET ORAL EVERY 6 HOURS PRN
Qty: 30 TABLET | Refills: 0 | Status: SHIPPED | OUTPATIENT
Start: 2018-07-03 | End: 2018-08-02 | Stop reason: SDUPTHER

## 2018-07-05 ENCOUNTER — TELEPHONE (OUTPATIENT)
Dept: INTERNAL MEDICINE | Age: 63
End: 2018-07-05

## 2018-07-05 NOTE — TELEPHONE ENCOUNTER
Medication is pending to be signed with Refills. Medication is Approved according to the record of the PA.

## 2018-07-06 ENCOUNTER — TELEPHONE (OUTPATIENT)
Dept: INTERNAL MEDICINE | Age: 63
End: 2018-07-06

## 2018-07-06 RX ORDER — METFORMIN HYDROCHLORIDE 1000 MG/1
2000 TABLET, FILM COATED, EXTENDED RELEASE ORAL
Qty: 60 TABLET | Refills: 3 | Status: SHIPPED | OUTPATIENT
Start: 2018-07-06 | End: 2018-08-08 | Stop reason: SDUPTHER

## 2018-07-06 NOTE — TELEPHONE ENCOUNTER
Medication Override for $3800 has been processed for 6 months. Co-pay is now $15 a month    Spoke with Janel from [facility] @ [phone]    Pt advised.

## 2018-07-24 ENCOUNTER — TELEPHONE (OUTPATIENT)
Dept: INTERNAL MEDICINE | Age: 63
End: 2018-07-24

## 2018-07-26 RX ORDER — LISINOPRIL 20 MG/1
TABLET ORAL
Qty: 30 TABLET | Refills: 2 | Status: SHIPPED | OUTPATIENT
Start: 2018-07-26 | End: 2018-10-27 | Stop reason: SDUPTHER

## 2018-08-02 DIAGNOSIS — F41.9 ANXIETY: ICD-10-CM

## 2018-08-03 RX ORDER — DIAZEPAM 10 MG/1
10 TABLET ORAL EVERY 6 HOURS PRN
Qty: 30 TABLET | Refills: 0 | Status: SHIPPED | OUTPATIENT
Start: 2018-08-03 | End: 2018-09-02

## 2018-08-03 RX ORDER — FENOFIBRIC ACID 135 MG/1
CAPSULE, DELAYED RELEASE ORAL
Qty: 90 CAPSULE | Refills: 0 | Status: SHIPPED | OUTPATIENT
Start: 2018-08-03 | End: 2018-11-19 | Stop reason: SDUPTHER

## 2018-08-08 RX ORDER — METFORMIN HYDROCHLORIDE 1000 MG/1
2000 TABLET, FILM COATED, EXTENDED RELEASE ORAL
Qty: 60 TABLET | Refills: 3 | Status: SHIPPED | OUTPATIENT
Start: 2018-08-08 | End: 2018-12-06

## 2018-08-23 ENCOUNTER — TELEPHONE (OUTPATIENT)
Dept: INTERNAL MEDICINE | Age: 63
End: 2018-08-23

## 2018-08-23 RX ORDER — IBUPROFEN 600 MG/1
600 TABLET ORAL EVERY 6 HOURS PRN
Qty: 120 TABLET | Refills: 1 | Status: SHIPPED | OUTPATIENT
Start: 2018-08-23 | End: 2018-11-16 | Stop reason: SDUPTHER

## 2018-08-23 RX ORDER — FLUOXETINE 10 MG/1
CAPSULE ORAL
Qty: 270 CAPSULE | Refills: 1 | Status: SHIPPED | OUTPATIENT
Start: 2018-08-23 | End: 2019-02-20 | Stop reason: SDUPTHER

## 2018-08-23 NOTE — TELEPHONE ENCOUNTER
Patient requesting a medication refill. Medication fluoxetine  Dosage 10 mg  FrequencyTAKE THREE CAPSULES BY MOUTH ONCE DAILY  Last filled on 2/21/18  [address]     Patient requesting a medication refill.   Medication Ibuprofen  Dosage 600 mg  FrequencyTake 1 tablet by mouth every 6 hours as needed for Pain With food  Last filled on 7/3/18  [facility]     LOV: 5/24/18  FOV: 8/30/18

## 2018-08-30 ENCOUNTER — OFFICE VISIT (OUTPATIENT)
Dept: INTERNAL MEDICINE | Age: 63
End: 2018-08-30

## 2018-08-30 VITALS
OXYGEN SATURATION: 96 % | DIASTOLIC BLOOD PRESSURE: 80 MMHG | WEIGHT: 207 LBS | HEART RATE: 79 BPM | BODY MASS INDEX: 28.07 KG/M2 | SYSTOLIC BLOOD PRESSURE: 122 MMHG

## 2018-08-30 DIAGNOSIS — E11.9 TYPE 2 DIABETES MELLITUS WITHOUT COMPLICATION, UNSPECIFIED WHETHER LONG TERM INSULIN USE (HCC): Primary | ICD-10-CM

## 2018-08-30 DIAGNOSIS — E78.00 HYPERCHOLESTEROLEMIA: ICD-10-CM

## 2018-08-30 DIAGNOSIS — F41.1 ANXIETY STATE: ICD-10-CM

## 2018-08-30 DIAGNOSIS — I10 ESSENTIAL HYPERTENSION: ICD-10-CM

## 2018-08-30 LAB — HBA1C MFR BLD: 7.3 %

## 2018-08-30 PROCEDURE — 83036 HEMOGLOBIN GLYCOSYLATED A1C: CPT | Performed by: INTERNAL MEDICINE

## 2018-08-30 PROCEDURE — 99214 OFFICE O/P EST MOD 30 MIN: CPT | Performed by: INTERNAL MEDICINE

## 2018-08-30 ASSESSMENT — ENCOUNTER SYMPTOMS
SHORTNESS OF BREATH: 0
GASTROINTESTINAL NEGATIVE: 1
RESPIRATORY NEGATIVE: 1
WHEEZING: 0
CHEST TIGHTNESS: 0

## 2018-08-30 NOTE — PROGRESS NOTES
Subjective:      Patient ID: Lennox Mcfarland is a 58 y.o. y.o. male. Chief Complaint   Patient presents with    Hypertension    Diabetes       HPI    Patient is here for a recheck    Vitals:    08/30/18 1350   BP: 122/80   Pulse: 79   SpO2: 96%       Wt Readings from Last 3 Encounters:   08/30/18 207 lb (93.9 kg)   05/24/18 210 lb (95.3 kg)   02/21/18 208 lb (94.3 kg)     His blood sugar has been better. It was 112 this morning. It has been good. He is checking it only in the morning. Lab Results   Component Value Date    LABA1C 7.3 08/30/2018     Lab Results   Component Value Date    .6 05/24/2018     Patient is taking their cholesterol medication and watching diet without problem. Patient is taking blood pressure medication without problem         Discussed use, benefit, and side effects of prescribed medications. Barriers to medication compliance addressed. All patient questions answered. Pt voiced understanding. Review of Systems   Constitutional: Negative. HENT: Negative. Respiratory: Negative. Negative for chest tightness, shortness of breath and wheezing. Cardiovascular: Negative. Negative for chest pain, palpitations and leg swelling. Gastrointestinal: Negative. Genitourinary: Negative. Musculoskeletal: Negative for arthralgias and myalgias. Neurological: Negative. Objective:   Physical Exam   Constitutional: He appears well-developed and well-nourished. Neck: No JVD present. Carotid bruit is not present. Cardiovascular: Normal rate and normal heart sounds. No murmur heard. Pulmonary/Chest: Effort normal and breath sounds normal. He has no wheezes. He has no rales. Musculoskeletal: He exhibits no edema. Skin: Skin is dry.        Assessment:      See Problem List assessment and plan       Plan:     Type 2 diabetes mellitus without complication (HCC)  Sugar improved  Watch diet  Check blood sugar in the evening  Call if no better      Essential hypertension  BP stable  Continue present treatment      Hypercholesterolemia  Cholesterol stable  Continue treatment  Discussed goals    Anxiety state  OARRS reviewed  Contract in place      Patient engaged in shared decision making. Information given to evaluate options of treatment, understand what is needed and discuss importance of following plan.

## 2018-09-25 RX ORDER — ATORVASTATIN CALCIUM 40 MG/1
TABLET, FILM COATED ORAL
Qty: 90 TABLET | Refills: 2 | Status: SHIPPED | OUTPATIENT
Start: 2018-09-25 | End: 2019-06-24 | Stop reason: SDUPTHER

## 2018-09-28 DIAGNOSIS — F41.9 ANXIETY: Primary | ICD-10-CM

## 2018-09-28 RX ORDER — INSULIN GLARGINE 100 [IU]/ML
INJECTION, SOLUTION SUBCUTANEOUS
Qty: 3 PEN | Refills: 1 | Status: SHIPPED | OUTPATIENT
Start: 2018-09-28 | End: 2018-12-06 | Stop reason: SDUPTHER

## 2018-09-28 RX ORDER — DIAZEPAM 10 MG/1
TABLET ORAL
Qty: 30 TABLET | Refills: 0 | Status: SHIPPED | OUTPATIENT
Start: 2018-09-28 | End: 2018-11-01 | Stop reason: SDUPTHER

## 2018-10-29 RX ORDER — LISINOPRIL 20 MG/1
TABLET ORAL
Qty: 30 TABLET | Refills: 2 | Status: SHIPPED | OUTPATIENT
Start: 2018-10-29 | End: 2019-02-02 | Stop reason: SDUPTHER

## 2018-11-01 ENCOUNTER — TELEPHONE (OUTPATIENT)
Dept: INTERNAL MEDICINE CLINIC | Age: 63
End: 2018-11-01

## 2018-11-01 DIAGNOSIS — F41.9 ANXIETY: ICD-10-CM

## 2018-11-01 RX ORDER — DIAZEPAM 10 MG/1
TABLET ORAL
Qty: 30 TABLET | Refills: 0 | Status: SHIPPED | OUTPATIENT
Start: 2018-11-01 | End: 2018-11-28 | Stop reason: SDUPTHER

## 2018-11-01 NOTE — TELEPHONE ENCOUNTER
Patient requesting a medication refill.   Medication diazepam (VALIUM)  Dosage 10 MG tablet   FrequencyTAKE 1 TABLET BY MOUTH ONCE DAILY AS NEEDED FOR ANXIETY  Last filled on   09/28/18  [facility], [address]

## 2018-11-16 ENCOUNTER — TELEPHONE (OUTPATIENT)
Dept: INTERNAL MEDICINE CLINIC | Age: 63
End: 2018-11-16

## 2018-11-16 RX ORDER — IBUPROFEN 600 MG/1
600 TABLET ORAL EVERY 6 HOURS PRN
Qty: 120 TABLET | Refills: 1 | Status: SHIPPED | OUTPATIENT
Start: 2018-11-16 | End: 2019-01-17 | Stop reason: SDUPTHER

## 2018-11-19 ENCOUNTER — TELEPHONE (OUTPATIENT)
Dept: INTERNAL MEDICINE CLINIC | Age: 63
End: 2018-11-19

## 2018-11-19 RX ORDER — FENOFIBRIC ACID 135 MG/1
CAPSULE, DELAYED RELEASE ORAL
Qty: 90 CAPSULE | Refills: 0 | Status: SHIPPED | OUTPATIENT
Start: 2018-11-19 | End: 2019-02-16 | Stop reason: SDUPTHER

## 2018-11-27 ENCOUNTER — TELEPHONE (OUTPATIENT)
Dept: INTERNAL MEDICINE CLINIC | Age: 63
End: 2018-11-27

## 2018-11-28 ENCOUNTER — TELEPHONE (OUTPATIENT)
Dept: INTERNAL MEDICINE CLINIC | Age: 63
End: 2018-11-28

## 2018-11-28 DIAGNOSIS — F41.9 ANXIETY: ICD-10-CM

## 2018-11-28 RX ORDER — DIAZEPAM 10 MG/1
TABLET ORAL
Qty: 30 TABLET | Refills: 0 | Status: SHIPPED | OUTPATIENT
Start: 2018-11-28 | End: 2018-12-27 | Stop reason: SDUPTHER

## 2018-11-28 NOTE — TELEPHONE ENCOUNTER
Patient requesting a medication refill.   Medication diazepam (VALIUM)  Dosage 10 MG tablet   FrequencyTAKE 1 TABLET BY MOUTH ONCE DAILY AS NEEDED FOR ANXIETY  Last filled on 11/01/18  [facility], [address]

## 2018-12-06 ENCOUNTER — OFFICE VISIT (OUTPATIENT)
Dept: INTERNAL MEDICINE CLINIC | Age: 63
End: 2018-12-06
Payer: COMMERCIAL

## 2018-12-06 VITALS
DIASTOLIC BLOOD PRESSURE: 68 MMHG | BODY MASS INDEX: 28.17 KG/M2 | SYSTOLIC BLOOD PRESSURE: 142 MMHG | WEIGHT: 208 LBS | OXYGEN SATURATION: 96 % | HEIGHT: 72 IN | HEART RATE: 74 BPM | TEMPERATURE: 98.2 F

## 2018-12-06 DIAGNOSIS — E78.00 HYPERCHOLESTEROLEMIA: ICD-10-CM

## 2018-12-06 DIAGNOSIS — I10 ESSENTIAL HYPERTENSION: ICD-10-CM

## 2018-12-06 DIAGNOSIS — E11.9 TYPE 2 DIABETES MELLITUS WITHOUT COMPLICATION, UNSPECIFIED WHETHER LONG TERM INSULIN USE (HCC): Primary | ICD-10-CM

## 2018-12-06 PROCEDURE — 99214 OFFICE O/P EST MOD 30 MIN: CPT | Performed by: NURSE PRACTITIONER

## 2018-12-06 RX ORDER — METFORMIN HYDROCHLORIDE 500 MG/1
2000 TABLET, EXTENDED RELEASE ORAL
Qty: 360 TABLET | Refills: 0 | Status: SHIPPED | OUTPATIENT
Start: 2018-12-06 | End: 2019-03-07 | Stop reason: SDUPTHER

## 2018-12-06 ASSESSMENT — ENCOUNTER SYMPTOMS
EYE ITCHING: 0
CONSTIPATION: 0
SHORTNESS OF BREATH: 0
VOMITING: 0
RHINORRHEA: 0
COLOR CHANGE: 0
BLOOD IN STOOL: 0
WHEEZING: 0
COUGH: 0
NAUSEA: 0
DIARRHEA: 0
ABDOMINAL PAIN: 0
BACK PAIN: 0
SORE THROAT: 0
CHEST TIGHTNESS: 0
SINUS PRESSURE: 0
EYE REDNESS: 0

## 2018-12-06 ASSESSMENT — PATIENT HEALTH QUESTIONNAIRE - PHQ9
1. LITTLE INTEREST OR PLEASURE IN DOING THINGS: 0
SUM OF ALL RESPONSES TO PHQ QUESTIONS 1-9: 0
SUM OF ALL RESPONSES TO PHQ9 QUESTIONS 1 & 2: 0
2. FEELING DOWN, DEPRESSED OR HOPELESS: 0
SUM OF ALL RESPONSES TO PHQ QUESTIONS 1-9: 0

## 2018-12-07 NOTE — PROGRESS NOTES
Take 1 tablet by mouth daily Yes Sujatha Santiago MD   vardenafil (LEVITRA) 20 MG tablet TAKE ONE TABLET BY MOUTH AS NEEDED FOR  ERECTILE  DYSFUNCTION Yes Sujatha Santiago MD   glucose blood VI test strips (ASCENSIA AUTODISC VI;ONE TOUCH ULTRA TEST VI) strip 1 each by In Vitro route daily Patient uses One Touch Ultra 2 meter. Patient needs test strips for meter. Testing 2-3 times daily per E11.9 Yes Ramsey Candelaria APRN - CNP   Lancets MISC Patient uses One Touch Ultra 2 meter. Testing 2 -3 times daily per E11.9 Yes LUCY Richards - CNP   NOVOFINE 32G X 6 MM MISC USE WITH INSULIN AT BEDTIME Yes Miranda Rain MD   Glucose Blood (BLOOD GLUCOSE TEST STRIPS) STRP Patient is to test twice daily or as directed by physician. Yes Miranda Rain MD   Blood Glucose Monitoring Suppl JENNIFER Patient to test twice daily or advised by doctor Yes Miranda Rain MD   Accu-Chek Multiclix Lancets MISC USE ONE LANCET TO CHECK TWICE DAILY Yes Miranda Rain MD   ACCU-CHEK GEN PLUS strip USE ONE STRIP TO CHECK TWICE DAILY Yes Miranda Rain MD   Loperamide HCl (IMODIUM A-D PO) Take 2 tablets by mouth daily  Yes Historical Provider, MD   aspirin 81 MG tablet Take 81 mg by mouth daily. Yes Historical Provider, MD   EPINEPHrine (EPIPEN) 0.3 MG/0.3ML JENNIFER injection Use as directed for allergic reaction Yes Miranda Rain MD   Multiple Vitamin (MULTIVITAMIN PO) Take  by mouth daily.  Yes Historical Provider, MD   Loratadine 10 MG CAPS Take 1 tablet by mouth daily  Historical Provider, MD        Social History   Substance Use Topics    Smoking status: Current Every Day Smoker     Packs/day: 1.00     Years: 38.00     Types: Cigarettes    Smokeless tobacco: Never Used    Alcohol use 0.0 oz/week      Comment: Occasional beer        Vitals:    12/06/18 1435   BP: (!) 142/68   Site: Left Upper Arm   Position: Sitting   Cuff Size: Medium Adult   Pulse: 74   Temp: 98.2 °F (36.8 °C)   SpO2: 96%   Weight: 208 lb (94.3

## 2018-12-26 DIAGNOSIS — F41.9 ANXIETY: ICD-10-CM

## 2018-12-26 RX ORDER — DIAZEPAM 10 MG/1
TABLET ORAL
Qty: 30 TABLET | Refills: 0 | OUTPATIENT
Start: 2018-12-26

## 2018-12-27 ENCOUNTER — TELEPHONE (OUTPATIENT)
Dept: INTERNAL MEDICINE CLINIC | Age: 63
End: 2018-12-27

## 2018-12-27 DIAGNOSIS — F41.9 ANXIETY: ICD-10-CM

## 2018-12-27 RX ORDER — DIAZEPAM 10 MG/1
TABLET ORAL
Qty: 10 TABLET | Refills: 0 | Status: SHIPPED | OUTPATIENT
Start: 2018-12-27 | End: 2018-12-28 | Stop reason: SDUPTHER

## 2018-12-28 ENCOUNTER — TELEPHONE (OUTPATIENT)
Dept: INTERNAL MEDICINE CLINIC | Age: 63
End: 2018-12-28

## 2018-12-28 DIAGNOSIS — F41.9 ANXIETY: ICD-10-CM

## 2018-12-28 RX ORDER — DIAZEPAM 10 MG/1
TABLET ORAL
Qty: 7 TABLET | Refills: 0 | Status: SHIPPED | OUTPATIENT
Start: 2018-12-28 | End: 2019-01-08 | Stop reason: SDUPTHER

## 2018-12-28 NOTE — TELEPHONE ENCOUNTER
Pt was in for appointment 12/6/2018  Pt is addiment that his medications were reviewed at his last appointment  Pt refuses to come in for another appointment 3 weeks following his previous appointment and pay another $30 co pay and drive 60 miles round trip because his diazepam (VALIUM) 10 MG tablet needs refilled   Pt would like the document emailed to him so that is can be signed or have it ready for him to come in and be signed in office  I advised pt that Kaushik Kumar is not able to fill certain medications, and will have to be done by Dr. Ayad Amaya upset  Pt would like a call back today before the holiday to get this medication taken care of

## 2018-12-28 NOTE — TELEPHONE ENCOUNTER
Advise him I will discuss with Kaushik Kumar next time she is in. If it was discussed, she can addend the note  If not, he will need to be seen for medication    This is the law.

## 2019-01-03 ENCOUNTER — TELEPHONE (OUTPATIENT)
Dept: INTERNAL MEDICINE CLINIC | Age: 64
End: 2019-01-03

## 2019-01-08 ENCOUNTER — OFFICE VISIT (OUTPATIENT)
Dept: INTERNAL MEDICINE CLINIC | Age: 64
End: 2019-01-08
Payer: COMMERCIAL

## 2019-01-08 ENCOUNTER — TELEPHONE (OUTPATIENT)
Dept: INTERNAL MEDICINE CLINIC | Age: 64
End: 2019-01-08

## 2019-01-08 VITALS
SYSTOLIC BLOOD PRESSURE: 136 MMHG | WEIGHT: 209 LBS | HEART RATE: 94 BPM | OXYGEN SATURATION: 96 % | BODY MASS INDEX: 28.35 KG/M2 | DIASTOLIC BLOOD PRESSURE: 80 MMHG

## 2019-01-08 DIAGNOSIS — F41.9 ANXIETY: ICD-10-CM

## 2019-01-08 DIAGNOSIS — F41.1 ANXIETY STATE: ICD-10-CM

## 2019-01-08 PROCEDURE — 99213 OFFICE O/P EST LOW 20 MIN: CPT | Performed by: INTERNAL MEDICINE

## 2019-01-08 ASSESSMENT — ENCOUNTER SYMPTOMS
WHEEZING: 0
GASTROINTESTINAL NEGATIVE: 1
SHORTNESS OF BREATH: 0
RESPIRATORY NEGATIVE: 1
CHEST TIGHTNESS: 0

## 2019-01-09 RX ORDER — DIAZEPAM 10 MG/1
TABLET ORAL
Qty: 28 TABLET | Refills: 0 | Status: SHIPPED | OUTPATIENT
Start: 2019-01-09 | End: 2019-02-06 | Stop reason: SDUPTHER

## 2019-01-17 ENCOUNTER — TELEPHONE (OUTPATIENT)
Dept: INTERNAL MEDICINE CLINIC | Age: 64
End: 2019-01-17

## 2019-01-17 RX ORDER — IBUPROFEN 600 MG/1
600 TABLET ORAL EVERY 6 HOURS PRN
Qty: 120 TABLET | Refills: 1 | Status: SHIPPED | OUTPATIENT
Start: 2019-01-17 | End: 2019-06-24

## 2019-02-04 RX ORDER — LISINOPRIL 20 MG/1
TABLET ORAL
Qty: 30 TABLET | Refills: 2 | Status: SHIPPED | OUTPATIENT
Start: 2019-02-04 | End: 2019-05-05 | Stop reason: SDUPTHER

## 2019-02-06 DIAGNOSIS — F41.9 ANXIETY: ICD-10-CM

## 2019-02-06 RX ORDER — DIAZEPAM 10 MG/1
TABLET ORAL
Qty: 28 TABLET | Refills: 0 | Status: SHIPPED | OUTPATIENT
Start: 2019-02-06 | End: 2019-03-07

## 2019-02-18 RX ORDER — FENOFIBRIC ACID 135 MG/1
CAPSULE, DELAYED RELEASE ORAL
Qty: 90 CAPSULE | Refills: 0 | Status: SHIPPED | OUTPATIENT
Start: 2019-02-18 | End: 2019-05-25 | Stop reason: SDUPTHER

## 2019-02-20 RX ORDER — FLUOXETINE 10 MG/1
CAPSULE ORAL
Qty: 270 CAPSULE | Refills: 1 | Status: SHIPPED | OUTPATIENT
Start: 2019-02-20 | End: 2019-05-24 | Stop reason: SDUPTHER

## 2019-02-20 RX ORDER — SITAGLIPTIN 100 MG/1
TABLET, FILM COATED ORAL
Qty: 30 TABLET | Refills: 1 | Status: SHIPPED | OUTPATIENT
Start: 2019-02-20 | End: 2019-05-15 | Stop reason: SDUPTHER

## 2019-03-07 ENCOUNTER — OFFICE VISIT (OUTPATIENT)
Dept: INTERNAL MEDICINE CLINIC | Age: 64
End: 2019-03-07
Payer: COMMERCIAL

## 2019-03-07 VITALS
HEART RATE: 78 BPM | SYSTOLIC BLOOD PRESSURE: 124 MMHG | DIASTOLIC BLOOD PRESSURE: 66 MMHG | WEIGHT: 209 LBS | BODY MASS INDEX: 28.35 KG/M2 | OXYGEN SATURATION: 95 %

## 2019-03-07 DIAGNOSIS — E11.9 TYPE 2 DIABETES MELLITUS WITHOUT COMPLICATION, WITHOUT LONG-TERM CURRENT USE OF INSULIN (HCC): Primary | ICD-10-CM

## 2019-03-07 DIAGNOSIS — E11.9 TYPE 2 DIABETES MELLITUS WITHOUT COMPLICATION, UNSPECIFIED WHETHER LONG TERM INSULIN USE (HCC): ICD-10-CM

## 2019-03-07 DIAGNOSIS — E78.00 HYPERCHOLESTEROLEMIA: ICD-10-CM

## 2019-03-07 DIAGNOSIS — I10 ESSENTIAL HYPERTENSION: ICD-10-CM

## 2019-03-07 DIAGNOSIS — F41.9 ANXIETY: ICD-10-CM

## 2019-03-07 PROCEDURE — 99214 OFFICE O/P EST MOD 30 MIN: CPT | Performed by: INTERNAL MEDICINE

## 2019-03-07 RX ORDER — METFORMIN HYDROCHLORIDE 500 MG/1
TABLET, EXTENDED RELEASE ORAL
Qty: 360 TABLET | Refills: 0 | Status: SHIPPED | OUTPATIENT
Start: 2019-03-07 | End: 2019-09-03 | Stop reason: SDUPTHER

## 2019-03-07 RX ORDER — DIAZEPAM 10 MG/1
TABLET ORAL
Qty: 28 TABLET | Refills: 0 | Status: SHIPPED | OUTPATIENT
Start: 2019-03-07 | End: 2019-04-11 | Stop reason: SDUPTHER

## 2019-03-07 RX ORDER — METFORMIN HYDROCHLORIDE 500 MG/1
2000 TABLET, EXTENDED RELEASE ORAL
Qty: 360 TABLET | Refills: 0 | Status: SHIPPED | OUTPATIENT
Start: 2019-03-07 | End: 2019-03-07

## 2019-03-07 RX ORDER — CLOTRIMAZOLE AND BETAMETHASONE DIPROPIONATE 10; .64 MG/G; MG/G
CREAM TOPICAL
Qty: 15 G | Refills: 1 | Status: SHIPPED | OUTPATIENT
Start: 2019-03-07

## 2019-03-07 ASSESSMENT — ENCOUNTER SYMPTOMS
RESPIRATORY NEGATIVE: 1
CHEST TIGHTNESS: 0
GASTROINTESTINAL NEGATIVE: 1
WHEEZING: 0
SHORTNESS OF BREATH: 0

## 2019-03-20 ENCOUNTER — TELEPHONE (OUTPATIENT)
Dept: INTERNAL MEDICINE CLINIC | Age: 64
End: 2019-03-20

## 2019-03-20 RX ORDER — KETOCONAZOLE 20 MG/G
CREAM TOPICAL
Qty: 30 G | Refills: 1 | Status: SHIPPED | OUTPATIENT
Start: 2019-03-20 | End: 2019-05-30 | Stop reason: SDUPTHER

## 2019-04-09 ENCOUNTER — OFFICE VISIT (OUTPATIENT)
Dept: INTERNAL MEDICINE CLINIC | Age: 64
End: 2019-04-09
Payer: COMMERCIAL

## 2019-04-09 VITALS
HEART RATE: 90 BPM | HEIGHT: 72 IN | BODY MASS INDEX: 28.85 KG/M2 | DIASTOLIC BLOOD PRESSURE: 72 MMHG | OXYGEN SATURATION: 92 % | SYSTOLIC BLOOD PRESSURE: 132 MMHG | WEIGHT: 213 LBS

## 2019-04-09 DIAGNOSIS — F41.9 ANXIETY: ICD-10-CM

## 2019-04-09 DIAGNOSIS — F41.1 ANXIETY STATE: ICD-10-CM

## 2019-04-09 PROCEDURE — 99213 OFFICE O/P EST LOW 20 MIN: CPT | Performed by: INTERNAL MEDICINE

## 2019-04-09 ASSESSMENT — PATIENT HEALTH QUESTIONNAIRE - PHQ9
SUM OF ALL RESPONSES TO PHQ QUESTIONS 1-9: 0
2. FEELING DOWN, DEPRESSED OR HOPELESS: 0
1. LITTLE INTEREST OR PLEASURE IN DOING THINGS: 0
SUM OF ALL RESPONSES TO PHQ QUESTIONS 1-9: 0
SUM OF ALL RESPONSES TO PHQ9 QUESTIONS 1 & 2: 0

## 2019-04-11 RX ORDER — DIAZEPAM 10 MG/1
TABLET ORAL
Qty: 28 TABLET | Refills: 0 | Status: SHIPPED | OUTPATIENT
Start: 2019-04-11 | End: 2019-05-08 | Stop reason: SDUPTHER

## 2019-05-06 RX ORDER — LISINOPRIL 20 MG/1
TABLET ORAL
Qty: 90 TABLET | Refills: 0 | Status: SHIPPED | OUTPATIENT
Start: 2019-05-06 | End: 2019-08-04 | Stop reason: SDUPTHER

## 2019-05-08 DIAGNOSIS — F41.9 ANXIETY: ICD-10-CM

## 2019-05-09 RX ORDER — DIAZEPAM 10 MG/1
TABLET ORAL
Qty: 28 TABLET | Refills: 0 | Status: SHIPPED | OUTPATIENT
Start: 2019-05-09 | End: 2019-06-05 | Stop reason: SDUPTHER

## 2019-05-16 RX ORDER — SITAGLIPTIN 100 MG/1
TABLET, FILM COATED ORAL
Qty: 30 TABLET | Refills: 1 | Status: SHIPPED | OUTPATIENT
Start: 2019-05-16 | End: 2019-07-17 | Stop reason: SDUPTHER

## 2019-05-24 ENCOUNTER — TELEPHONE (OUTPATIENT)
Dept: INTERNAL MEDICINE CLINIC | Age: 64
End: 2019-05-24

## 2019-05-24 RX ORDER — FLUOXETINE 10 MG/1
CAPSULE ORAL
Qty: 270 CAPSULE | Refills: 1 | Status: SHIPPED | OUTPATIENT
Start: 2019-05-24 | End: 2019-06-27 | Stop reason: SDUPTHER

## 2019-05-24 NOTE — TELEPHONE ENCOUNTER
He is intolerant to Pravastatin    Ask his insurance why would I give him Pravastatin if he is on Lipitor? He is on Fenofibric acid because this is the ONLY medication for triglycerides that is allowable with Lipitor.   That is the only option that is good

## 2019-05-24 NOTE — TELEPHONE ENCOUNTER
The pt's insurance doesn't cover fenofibric acid (FIBRICOR) 135 MG CPDR capsule   They suggested:    lovastatin  Pravastatin  ezetimibe

## 2019-05-28 RX ORDER — FENOFIBRIC ACID 135 MG/1
CAPSULE, DELAYED RELEASE ORAL
Qty: 90 CAPSULE | Refills: 0 | Status: SHIPPED | OUTPATIENT
Start: 2019-05-28 | End: 2019-07-09

## 2019-05-29 ENCOUNTER — TELEPHONE (OUTPATIENT)
Dept: INTERNAL MEDICINE CLINIC | Age: 64
End: 2019-05-29

## 2019-05-29 RX ORDER — FENOFIBRIC ACID 105 MG/1
105 TABLET ORAL DAILY
Qty: 30 TABLET | Refills: 1 | Status: SHIPPED | OUTPATIENT
Start: 2019-05-29 | End: 2019-07-30 | Stop reason: SDUPTHER

## 2019-05-29 NOTE — TELEPHONE ENCOUNTER
Prior Authorization: Approved   Malissa Stewart   Prior authorization approved Case ID: Malissa Stewart      Payer:  [employer] - Medicare    [phone]

## 2019-05-29 NOTE — TELEPHONE ENCOUNTER
Spoke to pharmacy  The Fenofibric Acid 105 mg tablet is covered under a different tier and it will only cost $47. Can the patient be prescribed this medication instead.  Patient is okay with this    Medication pended to be signed

## 2019-05-29 NOTE — TELEPHONE ENCOUNTER
The pt's insurance is charging him $70 for fenofibric acid (FIBRICOR) 135 MG CPDR capsule and it's going to be $200 for his insulin  He says he can't afford to take these  He's been without the fenofibric for 5 days  [employer] suggested Fenofibrate, fenofibrate micronized, fenofibrate yousuf crystalized, and demfibrazil

## 2019-05-30 RX ORDER — KETOCONAZOLE 20 MG/G
CREAM TOPICAL
Qty: 1 TUBE | Refills: 1 | Status: SHIPPED | OUTPATIENT
Start: 2019-05-30 | End: 2019-08-19 | Stop reason: SDUPTHER

## 2019-06-05 DIAGNOSIS — F41.9 ANXIETY: ICD-10-CM

## 2019-06-05 RX ORDER — DIAZEPAM 10 MG/1
TABLET ORAL
Qty: 28 TABLET | Refills: 0 | Status: SHIPPED | OUTPATIENT
Start: 2019-06-05 | End: 2019-07-02 | Stop reason: SDUPTHER

## 2019-06-24 RX ORDER — IBUPROFEN 600 MG/1
TABLET ORAL
Qty: 120 TABLET | Refills: 1 | Status: SHIPPED | OUTPATIENT
Start: 2019-06-24 | End: 2019-09-15 | Stop reason: SDUPTHER

## 2019-06-24 RX ORDER — ATORVASTATIN CALCIUM 40 MG/1
TABLET, FILM COATED ORAL
Qty: 90 TABLET | Refills: 2 | Status: SHIPPED | OUTPATIENT
Start: 2019-06-24 | End: 2020-03-30

## 2019-06-27 ENCOUNTER — TELEPHONE (OUTPATIENT)
Dept: INTERNAL MEDICINE CLINIC | Age: 64
End: 2019-06-27

## 2019-06-27 RX ORDER — FLUOXETINE 10 MG/1
CAPSULE ORAL
Qty: 270 CAPSULE | Refills: 1 | Status: SHIPPED | OUTPATIENT
Start: 2019-06-27 | End: 2019-12-23

## 2019-06-28 NOTE — TELEPHONE ENCOUNTER
Pt is calling about the pt's  FLUoxetine (PROZAC) 10 MG capsule. Pt states that pharmacy is telling him he needs prior auth for this med, pt also states he has been out of the med for a week. Pharmacy is [facility], phone [phone]. Pt's phone number is [phone], he asks for call back today please.

## 2019-07-02 ENCOUNTER — OFFICE VISIT (OUTPATIENT)
Dept: INTERNAL MEDICINE CLINIC | Age: 64
End: 2019-07-02
Payer: MEDICARE

## 2019-07-02 VITALS
OXYGEN SATURATION: 97 % | HEIGHT: 72 IN | BODY MASS INDEX: 26.82 KG/M2 | DIASTOLIC BLOOD PRESSURE: 70 MMHG | SYSTOLIC BLOOD PRESSURE: 118 MMHG | HEART RATE: 72 BPM | WEIGHT: 198 LBS

## 2019-07-02 DIAGNOSIS — F41.9 ANXIETY: ICD-10-CM

## 2019-07-02 DIAGNOSIS — F41.1 ANXIETY STATE: ICD-10-CM

## 2019-07-02 DIAGNOSIS — Z12.11 SCREENING FOR COLON CANCER: Primary | ICD-10-CM

## 2019-07-02 DIAGNOSIS — E11.9 TYPE 2 DIABETES MELLITUS WITHOUT COMPLICATION, UNSPECIFIED WHETHER LONG TERM INSULIN USE (HCC): ICD-10-CM

## 2019-07-02 LAB — HBA1C MFR BLD: 6.6 %

## 2019-07-02 PROCEDURE — 4004F PT TOBACCO SCREEN RCVD TLK: CPT | Performed by: INTERNAL MEDICINE

## 2019-07-02 PROCEDURE — 83036 HEMOGLOBIN GLYCOSYLATED A1C: CPT | Performed by: INTERNAL MEDICINE

## 2019-07-02 PROCEDURE — G8419 CALC BMI OUT NRM PARAM NOF/U: HCPCS | Performed by: INTERNAL MEDICINE

## 2019-07-02 PROCEDURE — 99213 OFFICE O/P EST LOW 20 MIN: CPT | Performed by: INTERNAL MEDICINE

## 2019-07-02 PROCEDURE — 1123F ACP DISCUSS/DSCN MKR DOCD: CPT | Performed by: INTERNAL MEDICINE

## 2019-07-02 PROCEDURE — G8427 DOCREV CUR MEDS BY ELIG CLIN: HCPCS | Performed by: INTERNAL MEDICINE

## 2019-07-02 PROCEDURE — 4040F PNEUMOC VAC/ADMIN/RCVD: CPT | Performed by: INTERNAL MEDICINE

## 2019-07-02 PROCEDURE — 3017F COLORECTAL CA SCREEN DOC REV: CPT | Performed by: INTERNAL MEDICINE

## 2019-07-02 RX ORDER — DIAZEPAM 10 MG/1
10 TABLET ORAL NIGHTLY PRN
Qty: 28 TABLET | Refills: 0 | Status: SHIPPED | OUTPATIENT
Start: 2019-07-02 | End: 2019-07-30 | Stop reason: SDUPTHER

## 2019-07-02 ASSESSMENT — PATIENT HEALTH QUESTIONNAIRE - PHQ9
SUM OF ALL RESPONSES TO PHQ9 QUESTIONS 1 & 2: 0
SUM OF ALL RESPONSES TO PHQ QUESTIONS 1-9: 0
1. LITTLE INTEREST OR PLEASURE IN DOING THINGS: 0
2. FEELING DOWN, DEPRESSED OR HOPELESS: 0
SUM OF ALL RESPONSES TO PHQ QUESTIONS 1-9: 0

## 2019-07-02 ASSESSMENT — ENCOUNTER SYMPTOMS
CHEST TIGHTNESS: 0
GASTROINTESTINAL NEGATIVE: 1
WHEEZING: 0
SHORTNESS OF BREATH: 0
RESPIRATORY NEGATIVE: 1

## 2019-07-02 NOTE — ASSESSMENT & PLAN NOTE
Stable  Continue treatment  OARRS reviewed  Contract in place  Advised agreement includes medical marijuana

## 2019-07-09 ENCOUNTER — OFFICE VISIT (OUTPATIENT)
Dept: INTERNAL MEDICINE CLINIC | Age: 64
End: 2019-07-09
Payer: MEDICARE

## 2019-07-09 VITALS
WEIGHT: 201.1 LBS | HEIGHT: 72 IN | HEART RATE: 78 BPM | SYSTOLIC BLOOD PRESSURE: 130 MMHG | DIASTOLIC BLOOD PRESSURE: 68 MMHG | BODY MASS INDEX: 27.24 KG/M2 | OXYGEN SATURATION: 98 %

## 2019-07-09 DIAGNOSIS — I10 ESSENTIAL HYPERTENSION: ICD-10-CM

## 2019-07-09 DIAGNOSIS — E11.9 TYPE 2 DIABETES MELLITUS WITHOUT COMPLICATION, UNSPECIFIED WHETHER LONG TERM INSULIN USE (HCC): Primary | ICD-10-CM

## 2019-07-09 DIAGNOSIS — M75.81 RIGHT ROTATOR CUFF TENDINITIS: ICD-10-CM

## 2019-07-09 DIAGNOSIS — E78.00 HYPERCHOLESTEROLEMIA: ICD-10-CM

## 2019-07-09 PROCEDURE — 1123F ACP DISCUSS/DSCN MKR DOCD: CPT | Performed by: INTERNAL MEDICINE

## 2019-07-09 PROCEDURE — G8419 CALC BMI OUT NRM PARAM NOF/U: HCPCS | Performed by: INTERNAL MEDICINE

## 2019-07-09 PROCEDURE — 4004F PT TOBACCO SCREEN RCVD TLK: CPT | Performed by: INTERNAL MEDICINE

## 2019-07-09 PROCEDURE — 4040F PNEUMOC VAC/ADMIN/RCVD: CPT | Performed by: INTERNAL MEDICINE

## 2019-07-09 PROCEDURE — G8427 DOCREV CUR MEDS BY ELIG CLIN: HCPCS | Performed by: INTERNAL MEDICINE

## 2019-07-09 PROCEDURE — 99214 OFFICE O/P EST MOD 30 MIN: CPT | Performed by: INTERNAL MEDICINE

## 2019-07-09 PROCEDURE — 3044F HG A1C LEVEL LT 7.0%: CPT | Performed by: INTERNAL MEDICINE

## 2019-07-09 PROCEDURE — 2022F DILAT RTA XM EVC RTNOPTHY: CPT | Performed by: INTERNAL MEDICINE

## 2019-07-09 PROCEDURE — 3017F COLORECTAL CA SCREEN DOC REV: CPT | Performed by: INTERNAL MEDICINE

## 2019-07-09 ASSESSMENT — ENCOUNTER SYMPTOMS
GASTROINTESTINAL NEGATIVE: 1
SHORTNESS OF BREATH: 0
WHEEZING: 0
RESPIRATORY NEGATIVE: 1
CHEST TIGHTNESS: 0

## 2019-07-09 ASSESSMENT — PATIENT HEALTH QUESTIONNAIRE - PHQ9
1. LITTLE INTEREST OR PLEASURE IN DOING THINGS: 0
SUM OF ALL RESPONSES TO PHQ9 QUESTIONS 1 & 2: 0
2. FEELING DOWN, DEPRESSED OR HOPELESS: 0
SUM OF ALL RESPONSES TO PHQ QUESTIONS 1-9: 0
SUM OF ALL RESPONSES TO PHQ QUESTIONS 1-9: 0

## 2019-07-09 NOTE — PATIENT INSTRUCTIONS
Patient Self-Management Goal for Chronic Condition  Goal: I will take all medications as prescribed by my doctor, and I will call the office if I am having any medication problems. Barriers to success: none  Plan for overcoming my barriers: N/A     Confidence: 10/10  Date goal set: 7/9/19  Date goal attained:   Self- Management Goals for the Patient with High Cholesterol: It is important to have goals to work towards when you have High Cholesterol. Below is a list of goals your doctor would like you to work towards to help control your hyperlipidemia and also maintain and improve your overall health. Please select one of these goals to try before your next follow up visit:    Goal: I will schedule my follow-up appointment for high cholesterol at checkout desk today. I will keep the appointment or, if necessary, reschedule within a reasonable timeframe. Guess your barriers before they happen. Everyone runs into barriers to their goals. You may already know what's going to get in your way. Write down these problems (cost? time? stress? fear?), and think of ways to get around them.      Barriers to success: none  Plan for overcoming my barriers: N/A     Confidence: 10/10  Date goal set: 7/9/19  Date goal attained:

## 2019-07-17 RX ORDER — SITAGLIPTIN 100 MG/1
TABLET, FILM COATED ORAL
Qty: 30 TABLET | Refills: 1 | Status: SHIPPED | OUTPATIENT
Start: 2019-07-17 | End: 2019-09-15 | Stop reason: SDUPTHER

## 2019-07-30 DIAGNOSIS — F41.9 ANXIETY: ICD-10-CM

## 2019-07-30 RX ORDER — FENOFIBRIC ACID 105 MG/1
TABLET ORAL
Qty: 90 TABLET | Refills: 0 | Status: SHIPPED | OUTPATIENT
Start: 2019-07-30 | End: 2019-11-12

## 2019-07-30 RX ORDER — DIAZEPAM 10 MG/1
TABLET ORAL
Qty: 28 TABLET | Refills: 0 | Status: SHIPPED | OUTPATIENT
Start: 2019-07-30 | End: 2019-08-26 | Stop reason: SDUPTHER

## 2019-07-30 NOTE — TELEPHONE ENCOUNTER
Medication:   Requested Prescriptions     Pending Prescriptions Disp Refills    fenofibric acid (FIBRICOR) 105 MG TABS tablet [Pharmacy Med Name: FENOFIBRIC 105MG    TAB] 30 tablet 1     Sig: TAKE 1 TABLET BY MOUTH ONCE DAILY    diazepam (VALIUM) 10 MG tablet [Pharmacy Med Name: DIAZEPAM 10MG       TAB] 28 tablet 0     Sig: TAKE 1 TABLET BY MOUTH NIGHTLY AS NEEDED FOR ANXIETY       Last Filled:      Patient Phone Number: [phone] (home) [phone] (work)    Last appt: 7/9/2019   Next appt: 10/1/2019    Last BMP:   Lab Results   Component Value Date     05/24/2018    K 5.0 05/24/2018    CL 99 05/24/2018    CO2 26 05/24/2018    ANIONGAP 18 05/24/2018    GLUCOSE 115 05/24/2018    BUN 12 05/24/2018    CREATININE 0.9 05/24/2018    LABGLOM >60 05/24/2018    GFRAA >60 05/24/2018    GFRAA >60 05/17/2011    CALCIUM 10.5 05/24/2018      Last CMP:   Lab Results   Component Value Date     05/24/2018    K 5.0 05/24/2018    CL 99 05/24/2018    CO2 26 05/24/2018    ANIONGAP 18 05/24/2018    GLUCOSE 115 05/24/2018    BUN 12 05/24/2018    CREATININE 0.9 05/24/2018    LABGLOM >60 05/24/2018    GFRAA >60 05/24/2018    GFRAA >60 05/17/2011    PROT 8.0 05/24/2018    PROT 7.5 05/17/2011    LABALBU 5.1 05/24/2018    AGRATIO 1.8 05/24/2018    BILITOT 0.7 05/24/2018    ALKPHOS 77 05/24/2018    ALT 28 05/24/2018    AST 27 05/24/2018    GLOB 2.9 05/24/2018     Last Renal Function:   Lab Results   Component Value Date     05/24/2018    K 5.0 05/24/2018    CL 99 05/24/2018    CO2 26 05/24/2018    GLUCOSE 115 05/24/2018    BUN 12 05/24/2018    CREATININE 0.9 05/24/2018    LABALBU 5.1 05/24/2018    CALCIUM 10.5 05/24/2018    GFR >60 05/17/2011    GFRAA >60 05/24/2018    GFRAA >60 05/17/2011     Last Labs DM:   Lab Results   Component Value Date    LABA1C 6.6 07/02/2019     Last Lipid:   Lab Results   Component Value Date    CHOL 137 05/24/2018    TRIG 245 05/24/2018    HDL 45 05/24/2018    HDL 40 05/17/2011    LDLCALC 43

## 2019-08-05 RX ORDER — LISINOPRIL 20 MG/1
TABLET ORAL
Qty: 90 TABLET | Refills: 0 | Status: SHIPPED | OUTPATIENT
Start: 2019-08-05 | End: 2019-11-03 | Stop reason: SDUPTHER

## 2019-08-19 RX ORDER — KETOCONAZOLE 20 MG/G
CREAM TOPICAL
Qty: 30 G | Refills: 1 | Status: SHIPPED | OUTPATIENT
Start: 2019-08-19 | End: 2022-03-29 | Stop reason: ALTCHOICE

## 2019-08-20 ENCOUNTER — TELEPHONE (OUTPATIENT)
Dept: INTERNAL MEDICINE CLINIC | Age: 64
End: 2019-08-20

## 2019-08-20 NOTE — TELEPHONE ENCOUNTER
Pt calling because he was told by [facility] that  stopped making Fenofibric Acid in 105mg. The 135mg is still available but pt states that it is too expensive. Can you switch him to different med? Pharmacy is [facility], phone [phone].   Pt can be reached at [phone], he would like a call back today if at all possible.

## 2019-08-26 DIAGNOSIS — F41.9 ANXIETY: ICD-10-CM

## 2019-08-26 RX ORDER — DIAZEPAM 10 MG/1
TABLET ORAL
Qty: 28 TABLET | Refills: 0 | Status: SHIPPED | OUTPATIENT
Start: 2019-08-26 | End: 2019-09-23 | Stop reason: SDUPTHER

## 2019-09-03 DIAGNOSIS — E11.9 TYPE 2 DIABETES MELLITUS WITHOUT COMPLICATION, UNSPECIFIED WHETHER LONG TERM INSULIN USE (HCC): ICD-10-CM

## 2019-09-03 RX ORDER — METFORMIN HYDROCHLORIDE 500 MG/1
TABLET, EXTENDED RELEASE ORAL
Qty: 360 TABLET | Refills: 0 | Status: SHIPPED | OUTPATIENT
Start: 2019-09-03 | End: 2019-12-03 | Stop reason: SDUPTHER

## 2019-09-16 RX ORDER — SITAGLIPTIN 100 MG/1
TABLET, FILM COATED ORAL
Qty: 30 TABLET | Refills: 1 | Status: SHIPPED | OUTPATIENT
Start: 2019-09-16 | End: 2019-10-25

## 2019-09-16 RX ORDER — IBUPROFEN 600 MG/1
TABLET ORAL
Qty: 120 TABLET | Refills: 1 | Status: SHIPPED | OUTPATIENT
Start: 2019-09-16 | End: 2019-11-23 | Stop reason: SDUPTHER

## 2019-09-23 DIAGNOSIS — F41.9 ANXIETY: ICD-10-CM

## 2019-09-23 RX ORDER — DIAZEPAM 10 MG/1
TABLET ORAL
Qty: 28 TABLET | Refills: 0 | Status: SHIPPED | OUTPATIENT
Start: 2019-09-23 | End: 2019-10-22 | Stop reason: SDUPTHER

## 2019-10-01 ENCOUNTER — OFFICE VISIT (OUTPATIENT)
Dept: INTERNAL MEDICINE CLINIC | Age: 64
End: 2019-10-01
Payer: MEDICARE

## 2019-10-01 VITALS
OXYGEN SATURATION: 98 % | DIASTOLIC BLOOD PRESSURE: 72 MMHG | SYSTOLIC BLOOD PRESSURE: 132 MMHG | HEIGHT: 72 IN | BODY MASS INDEX: 27.4 KG/M2 | WEIGHT: 202.3 LBS | HEART RATE: 80 BPM

## 2019-10-01 DIAGNOSIS — Z23 NEED FOR PNEUMOCOCCAL VACCINATION: ICD-10-CM

## 2019-10-01 DIAGNOSIS — Z13.6 SCREENING FOR AAA (ABDOMINAL AORTIC ANEURYSM): ICD-10-CM

## 2019-10-01 DIAGNOSIS — Z23 NEED FOR INFLUENZA VACCINATION: ICD-10-CM

## 2019-10-01 DIAGNOSIS — F41.1 ANXIETY STATE: Primary | ICD-10-CM

## 2019-10-01 PROCEDURE — 3017F COLORECTAL CA SCREEN DOC REV: CPT | Performed by: INTERNAL MEDICINE

## 2019-10-01 PROCEDURE — G8484 FLU IMMUNIZE NO ADMIN: HCPCS | Performed by: INTERNAL MEDICINE

## 2019-10-01 PROCEDURE — 1123F ACP DISCUSS/DSCN MKR DOCD: CPT | Performed by: INTERNAL MEDICINE

## 2019-10-01 PROCEDURE — G8419 CALC BMI OUT NRM PARAM NOF/U: HCPCS | Performed by: INTERNAL MEDICINE

## 2019-10-01 PROCEDURE — G8427 DOCREV CUR MEDS BY ELIG CLIN: HCPCS | Performed by: INTERNAL MEDICINE

## 2019-10-01 PROCEDURE — 4040F PNEUMOC VAC/ADMIN/RCVD: CPT | Performed by: INTERNAL MEDICINE

## 2019-10-01 PROCEDURE — G0008 ADMIN INFLUENZA VIRUS VAC: HCPCS | Performed by: INTERNAL MEDICINE

## 2019-10-01 PROCEDURE — 4004F PT TOBACCO SCREEN RCVD TLK: CPT | Performed by: INTERNAL MEDICINE

## 2019-10-01 PROCEDURE — 99213 OFFICE O/P EST LOW 20 MIN: CPT | Performed by: INTERNAL MEDICINE

## 2019-10-01 PROCEDURE — G0009 ADMIN PNEUMOCOCCAL VACCINE: HCPCS | Performed by: INTERNAL MEDICINE

## 2019-10-01 PROCEDURE — 90670 PCV13 VACCINE IM: CPT | Performed by: INTERNAL MEDICINE

## 2019-10-01 PROCEDURE — 90653 IIV ADJUVANT VACCINE IM: CPT | Performed by: INTERNAL MEDICINE

## 2019-10-01 ASSESSMENT — PATIENT HEALTH QUESTIONNAIRE - PHQ9
SUM OF ALL RESPONSES TO PHQ QUESTIONS 1-9: 0
2. FEELING DOWN, DEPRESSED OR HOPELESS: 0
1. LITTLE INTEREST OR PLEASURE IN DOING THINGS: 0
SUM OF ALL RESPONSES TO PHQ QUESTIONS 1-9: 0
DEPRESSION UNABLE TO ASSESS: FUNCTIONAL CAPACITY MOTIVATION LIMITS ACCURACY
SUM OF ALL RESPONSES TO PHQ9 QUESTIONS 1 & 2: 0

## 2019-10-01 ASSESSMENT — ENCOUNTER SYMPTOMS
RESPIRATORY NEGATIVE: 1
WHEEZING: 0
CHEST TIGHTNESS: 0
SHORTNESS OF BREATH: 0
GASTROINTESTINAL NEGATIVE: 1

## 2019-10-22 DIAGNOSIS — F41.9 ANXIETY: ICD-10-CM

## 2019-10-22 RX ORDER — DIAZEPAM 10 MG/1
TABLET ORAL
Qty: 28 TABLET | Refills: 0 | Status: SHIPPED | OUTPATIENT
Start: 2019-10-22 | End: 2019-11-20 | Stop reason: SDUPTHER

## 2019-10-23 ENCOUNTER — TELEPHONE (OUTPATIENT)
Dept: INTERNAL MEDICINE CLINIC | Age: 64
End: 2019-10-23

## 2019-10-24 ENCOUNTER — TELEPHONE (OUTPATIENT)
Dept: INTERNAL MEDICINE CLINIC | Age: 64
End: 2019-10-24

## 2019-10-24 DIAGNOSIS — E11.9 TYPE 2 DIABETES MELLITUS WITHOUT COMPLICATION, UNSPECIFIED WHETHER LONG TERM INSULIN USE (HCC): ICD-10-CM

## 2019-10-25 RX ORDER — GLIPIZIDE 10 MG/1
10 TABLET, FILM COATED, EXTENDED RELEASE ORAL DAILY
Qty: 90 TABLET | Refills: 3 | Status: SHIPPED | OUTPATIENT
Start: 2019-10-25 | End: 2020-10-19 | Stop reason: SDUPTHER

## 2019-11-04 RX ORDER — LISINOPRIL 20 MG/1
TABLET ORAL
Qty: 90 TABLET | Refills: 0 | Status: SHIPPED | OUTPATIENT
Start: 2019-11-04 | End: 2020-02-03

## 2019-11-12 ENCOUNTER — HOSPITAL ENCOUNTER (OUTPATIENT)
Dept: ULTRASOUND IMAGING | Age: 64
Discharge: HOME | End: 2019-11-12
Payer: MEDICARE

## 2019-11-12 ENCOUNTER — OFFICE VISIT (OUTPATIENT)
Dept: INTERNAL MEDICINE CLINIC | Age: 64
End: 2019-11-12
Payer: MEDICARE

## 2019-11-12 VITALS
SYSTOLIC BLOOD PRESSURE: 122 MMHG | HEIGHT: 72 IN | HEART RATE: 80 BPM | BODY MASS INDEX: 27.9 KG/M2 | DIASTOLIC BLOOD PRESSURE: 74 MMHG | WEIGHT: 206 LBS | OXYGEN SATURATION: 98 %

## 2019-11-12 DIAGNOSIS — E11.9 TYPE 2 DIABETES MELLITUS WITHOUT COMPLICATION, UNSPECIFIED WHETHER LONG TERM INSULIN USE (HCC): ICD-10-CM

## 2019-11-12 DIAGNOSIS — E78.00 HYPERCHOLESTEROLEMIA: ICD-10-CM

## 2019-11-12 DIAGNOSIS — M89.9 DISORDER OF BONE, UNSPECIFIED: ICD-10-CM

## 2019-11-12 DIAGNOSIS — Z12.5 ENCOUNTER FOR SCREENING FOR MALIGNANT NEOPLASM OF PROSTATE: ICD-10-CM

## 2019-11-12 DIAGNOSIS — Z87.891 PERSONAL HISTORY OF TOBACCO USE: ICD-10-CM

## 2019-11-12 DIAGNOSIS — I10 ESSENTIAL HYPERTENSION: ICD-10-CM

## 2019-11-12 DIAGNOSIS — Z00.00 ROUTINE GENERAL MEDICAL EXAMINATION AT A HEALTH CARE FACILITY: Primary | ICD-10-CM

## 2019-11-12 DIAGNOSIS — Z13.6 SCREENING FOR AAA (ABDOMINAL AORTIC ANEURYSM): ICD-10-CM

## 2019-11-12 DIAGNOSIS — Z72.0 TOBACCO ABUSE: ICD-10-CM

## 2019-11-12 LAB
A/G RATIO: 2.3 (ref 1.1–2.2)
ALBUMIN SERPL-MCNC: 5.3 G/DL (ref 3.4–5)
ALP BLD-CCNC: 88 U/L (ref 40–129)
ALT SERPL-CCNC: 32 U/L (ref 10–40)
ANION GAP SERPL CALCULATED.3IONS-SCNC: 22 MMOL/L (ref 3–16)
AST SERPL-CCNC: 28 U/L (ref 15–37)
BASOPHILS ABSOLUTE: 0.1 K/UL (ref 0–0.2)
BASOPHILS RELATIVE PERCENT: 0.5 %
BILIRUB SERPL-MCNC: 0.5 MG/DL (ref 0–1)
BILIRUBIN URINE: NEGATIVE
BLOOD, URINE: NEGATIVE
BUN BLDV-MCNC: 12 MG/DL (ref 7–20)
CALCIUM SERPL-MCNC: 10 MG/DL (ref 8.3–10.6)
CHLORIDE BLD-SCNC: 98 MMOL/L (ref 99–110)
CHOLESTEROL, TOTAL: 132 MG/DL (ref 0–199)
CLARITY: CLEAR
CO2: 20 MMOL/L (ref 21–32)
COLOR: YELLOW
CONTROL: NORMAL
CREAT SERPL-MCNC: 0.7 MG/DL (ref 0.8–1.3)
CREATININE URINE: 66.3 MG/DL (ref 39–259)
DIABETIC RETINOPATHY: NEGATIVE
EOSINOPHILS ABSOLUTE: 0.3 K/UL (ref 0–0.6)
EOSINOPHILS RELATIVE PERCENT: 3.2 %
EPITHELIAL CELLS, UA: 0 /HPF (ref 0–5)
GFR AFRICAN AMERICAN: >60
GFR NON-AFRICAN AMERICAN: >60
GLOBULIN: 2.3 G/DL
GLUCOSE BLD-MCNC: 91 MG/DL (ref 70–99)
GLUCOSE URINE: NEGATIVE MG/DL
HCT VFR BLD CALC: 50 % (ref 40.5–52.5)
HDLC SERPL-MCNC: 47 MG/DL (ref 40–60)
HEMOCCULT STL QL: NORMAL
HEMOGLOBIN: 17.2 G/DL (ref 13.5–17.5)
HYALINE CASTS: 0 /LPF (ref 0–8)
KETONES, URINE: NEGATIVE MG/DL
LDL CHOLESTEROL CALCULATED: 26 MG/DL
LEUKOCYTE ESTERASE, URINE: NEGATIVE
LYMPHOCYTES ABSOLUTE: 2.2 K/UL (ref 1–5.1)
LYMPHOCYTES RELATIVE PERCENT: 22 %
MCH RBC QN AUTO: 32.1 PG (ref 26–34)
MCHC RBC AUTO-ENTMCNC: 34.4 G/DL (ref 31–36)
MCV RBC AUTO: 93.2 FL (ref 80–100)
MICROALBUMIN UR-MCNC: 9.5 MG/DL
MICROALBUMIN/CREAT UR-RTO: 143.3 MG/G (ref 0–30)
MICROSCOPIC EXAMINATION: YES
MONOCYTES ABSOLUTE: 0.8 K/UL (ref 0–1.3)
MONOCYTES RELATIVE PERCENT: 8.1 %
NEUTROPHILS ABSOLUTE: 6.5 K/UL (ref 1.7–7.7)
NEUTROPHILS RELATIVE PERCENT: 66.2 %
NITRITE, URINE: NEGATIVE
PDW BLD-RTO: 13.3 % (ref 12.4–15.4)
PH UA: 6.5 (ref 5–8)
PLATELET # BLD: 252 K/UL (ref 135–450)
PMV BLD AUTO: 7.7 FL (ref 5–10.5)
POTASSIUM SERPL-SCNC: 4.4 MMOL/L (ref 3.5–5.1)
PROSTATE SPECIFIC ANTIGEN: 0.81 NG/ML (ref 0–4)
PROTEIN UA: ABNORMAL MG/DL
RBC # BLD: 5.36 M/UL (ref 4.2–5.9)
RBC UA: 2 /HPF (ref 0–4)
SODIUM BLD-SCNC: 140 MMOL/L (ref 136–145)
SPECIFIC GRAVITY UA: 1.01 (ref 1–1.03)
TOTAL PROTEIN: 7.6 G/DL (ref 6.4–8.2)
TRIGL SERPL-MCNC: 294 MG/DL (ref 0–150)
TSH SERPL DL<=0.05 MIU/L-ACNC: 1.6 UIU/ML (ref 0.27–4.2)
URINE TYPE: ABNORMAL
UROBILINOGEN, URINE: 0.2 E.U./DL
VITAMIN D 25-HYDROXY: 26.1 NG/ML
VLDLC SERPL CALC-MCNC: 59 MG/DL
WBC # BLD: 9.8 K/UL (ref 4–11)
WBC UA: 0 /HPF (ref 0–5)

## 2019-11-12 PROCEDURE — 76706 US ABDL AORTA SCREEN AAA: CPT

## 2019-11-12 PROCEDURE — 93000 ELECTROCARDIOGRAM COMPLETE: CPT | Performed by: INTERNAL MEDICINE

## 2019-11-12 PROCEDURE — G0402 INITIAL PREVENTIVE EXAM: HCPCS | Performed by: INTERNAL MEDICINE

## 2019-11-12 PROCEDURE — 4040F PNEUMOC VAC/ADMIN/RCVD: CPT | Performed by: INTERNAL MEDICINE

## 2019-11-12 PROCEDURE — 3044F HG A1C LEVEL LT 7.0%: CPT | Performed by: INTERNAL MEDICINE

## 2019-11-12 PROCEDURE — 3017F COLORECTAL CA SCREEN DOC REV: CPT | Performed by: INTERNAL MEDICINE

## 2019-11-12 PROCEDURE — G0296 VISIT TO DETERM LDCT ELIG: HCPCS | Performed by: INTERNAL MEDICINE

## 2019-11-12 PROCEDURE — 82274 ASSAY TEST FOR BLOOD FECAL: CPT | Performed by: INTERNAL MEDICINE

## 2019-11-12 PROCEDURE — 1123F ACP DISCUSS/DSCN MKR DOCD: CPT | Performed by: INTERNAL MEDICINE

## 2019-11-12 ASSESSMENT — ENCOUNTER SYMPTOMS
BACK PAIN: 0
VOICE CHANGE: 0
DIARRHEA: 0
NAUSEA: 0
SINUS PRESSURE: 0
VOMITING: 0
EYE DISCHARGE: 0
APNEA: 0
RHINORRHEA: 0
ANAL BLEEDING: 0
EYE ITCHING: 0
STRIDOR: 0
TROUBLE SWALLOWING: 0
SORE THROAT: 0
WHEEZING: 0
RECTAL PAIN: 0
BLOOD IN STOOL: 0
EYE REDNESS: 0
COLOR CHANGE: 0
COUGH: 0
ABDOMINAL PAIN: 0
CHEST TIGHTNESS: 0
EYE PAIN: 0
PHOTOPHOBIA: 0
SHORTNESS OF BREATH: 0
ABDOMINAL DISTENTION: 0
FACIAL SWELLING: 0
CONSTIPATION: 0
CHOKING: 0

## 2019-11-12 ASSESSMENT — LIFESTYLE VARIABLES: HOW OFTEN DO YOU HAVE A DRINK CONTAINING ALCOHOL: 0

## 2019-11-12 ASSESSMENT — PATIENT HEALTH QUESTIONNAIRE - PHQ9
SUM OF ALL RESPONSES TO PHQ QUESTIONS 1-9: 0
SUM OF ALL RESPONSES TO PHQ QUESTIONS 1-9: 0

## 2019-11-13 LAB
ESTIMATED AVERAGE GLUCOSE: 154.2 MG/DL
HBA1C MFR BLD: 7 %

## 2019-11-15 ENCOUNTER — TELEPHONE (OUTPATIENT)
Dept: INTERNAL MEDICINE CLINIC | Age: 64
End: 2019-11-15

## 2019-11-20 DIAGNOSIS — F41.9 ANXIETY: ICD-10-CM

## 2019-11-20 RX ORDER — DIAZEPAM 10 MG/1
TABLET ORAL
Qty: 28 TABLET | Refills: 0 | Status: SHIPPED | OUTPATIENT
Start: 2019-11-20 | End: 2019-12-16 | Stop reason: SDUPTHER

## 2019-11-25 RX ORDER — IBUPROFEN 600 MG/1
TABLET ORAL
Qty: 120 TABLET | Refills: 0 | Status: SHIPPED | OUTPATIENT
Start: 2019-11-25 | End: 2020-01-02

## 2019-12-03 DIAGNOSIS — E11.9 TYPE 2 DIABETES MELLITUS WITHOUT COMPLICATION, UNSPECIFIED WHETHER LONG TERM INSULIN USE (HCC): ICD-10-CM

## 2019-12-03 RX ORDER — METFORMIN HYDROCHLORIDE 500 MG/1
TABLET, EXTENDED RELEASE ORAL
Qty: 360 TABLET | Refills: 0 | Status: SHIPPED | OUTPATIENT
Start: 2019-12-03 | End: 2020-03-02

## 2019-12-16 DIAGNOSIS — F41.9 ANXIETY: ICD-10-CM

## 2019-12-16 RX ORDER — DIAZEPAM 10 MG/1
TABLET ORAL
Qty: 28 TABLET | Refills: 0 | Status: SHIPPED | OUTPATIENT
Start: 2019-12-16 | End: 2020-01-13

## 2019-12-23 RX ORDER — FLUOXETINE HYDROCHLORIDE 20 MG/1
CAPSULE ORAL
Qty: 90 CAPSULE | Refills: 0 | Status: SHIPPED | OUTPATIENT
Start: 2019-12-23 | End: 2020-03-12 | Stop reason: SDUPTHER

## 2019-12-23 RX ORDER — FLUOXETINE 10 MG/1
CAPSULE ORAL
Qty: 90 CAPSULE | Refills: 0 | Status: SHIPPED | OUTPATIENT
Start: 2019-12-23 | End: 2020-03-12 | Stop reason: SDUPTHER

## 2020-01-02 RX ORDER — IBUPROFEN 600 MG/1
TABLET ORAL
Qty: 120 TABLET | Refills: 0 | Status: SHIPPED | OUTPATIENT
Start: 2020-01-02 | End: 2020-02-10

## 2020-01-13 RX ORDER — DIAZEPAM 10 MG/1
TABLET ORAL
Qty: 28 TABLET | Refills: 0 | Status: SHIPPED | OUTPATIENT
Start: 2020-01-13 | End: 2020-02-10

## 2020-02-03 RX ORDER — LISINOPRIL 20 MG/1
TABLET ORAL
Qty: 90 TABLET | Refills: 0 | Status: SHIPPED | OUTPATIENT
Start: 2020-02-03 | End: 2020-05-04

## 2020-02-10 RX ORDER — IBUPROFEN 600 MG/1
TABLET ORAL
Qty: 120 TABLET | Refills: 0 | Status: SHIPPED | OUTPATIENT
Start: 2020-02-10 | End: 2020-03-16

## 2020-02-10 RX ORDER — DIAZEPAM 10 MG/1
TABLET ORAL
Qty: 28 TABLET | Refills: 0 | Status: SHIPPED | OUTPATIENT
Start: 2020-02-10 | End: 2020-03-09

## 2020-02-12 ENCOUNTER — OFFICE VISIT (OUTPATIENT)
Dept: INTERNAL MEDICINE CLINIC | Age: 65
End: 2020-02-12
Payer: MEDICARE

## 2020-02-12 ENCOUNTER — OFFICE VISIT (OUTPATIENT)
Dept: INTERNAL MEDICINE CLINIC | Age: 65
End: 2020-02-12

## 2020-02-12 VITALS
HEART RATE: 76 BPM | DIASTOLIC BLOOD PRESSURE: 72 MMHG | WEIGHT: 209 LBS | BODY MASS INDEX: 28.35 KG/M2 | SYSTOLIC BLOOD PRESSURE: 126 MMHG | OXYGEN SATURATION: 95 %

## 2020-02-12 DIAGNOSIS — E11.9 TYPE 2 DIABETES MELLITUS WITHOUT COMPLICATION, WITHOUT LONG-TERM CURRENT USE OF INSULIN (HCC): ICD-10-CM

## 2020-02-12 PROCEDURE — G8427 DOCREV CUR MEDS BY ELIG CLIN: HCPCS | Performed by: INTERNAL MEDICINE

## 2020-02-12 PROCEDURE — 4004F PT TOBACCO SCREEN RCVD TLK: CPT | Performed by: INTERNAL MEDICINE

## 2020-02-12 PROCEDURE — 4040F PNEUMOC VAC/ADMIN/RCVD: CPT | Performed by: INTERNAL MEDICINE

## 2020-02-12 PROCEDURE — G8482 FLU IMMUNIZE ORDER/ADMIN: HCPCS | Performed by: INTERNAL MEDICINE

## 2020-02-12 PROCEDURE — 99214 OFFICE O/P EST MOD 30 MIN: CPT | Performed by: INTERNAL MEDICINE

## 2020-02-12 PROCEDURE — 1123F ACP DISCUSS/DSCN MKR DOCD: CPT | Performed by: INTERNAL MEDICINE

## 2020-02-12 PROCEDURE — 3046F HEMOGLOBIN A1C LEVEL >9.0%: CPT | Performed by: INTERNAL MEDICINE

## 2020-02-12 PROCEDURE — 3017F COLORECTAL CA SCREEN DOC REV: CPT | Performed by: INTERNAL MEDICINE

## 2020-02-12 PROCEDURE — G8417 CALC BMI ABV UP PARAM F/U: HCPCS | Performed by: INTERNAL MEDICINE

## 2020-02-12 PROCEDURE — 2022F DILAT RTA XM EVC RTNOPTHY: CPT | Performed by: INTERNAL MEDICINE

## 2020-02-12 ASSESSMENT — ENCOUNTER SYMPTOMS
SHORTNESS OF BREATH: 0
WHEEZING: 0
GASTROINTESTINAL NEGATIVE: 1
CHEST TIGHTNESS: 0
RESPIRATORY NEGATIVE: 1

## 2020-02-12 NOTE — PROGRESS NOTES
Subjective:      Patient ID: Ha Carrillo is a 71 y.o. y.o. male. Chief Complaint   Patient presents with    Nicotine Dependence       HPI    Patient is here for a recheck    Vitals:    02/12/20 1313   BP: 126/72   Pulse: 76   SpO2: 95%       Wt Readings from Last 3 Encounters:   02/12/20 209 lb (94.8 kg)   11/12/19 206 lb (93.4 kg)   10/01/19 202 lb 4.8 oz (91.8 kg)     He is not checking his blood sugar. He is taking his medication as directed. He has decreased insulin to 35 units. Patient is taking blood pressure medication without problem  Patient is taking their cholesterol medication and watching diet without problem. Discussed use, benefit, and side effects of prescribed medications. Barriers to medication compliance addressed. All patient questions answered. Pt voiced understanding. Review of Systems   Constitutional: Negative. HENT: Negative. Respiratory: Negative. Negative for chest tightness, shortness of breath and wheezing. Cardiovascular: Negative. Negative for chest pain, palpitations and leg swelling. Gastrointestinal: Negative. Genitourinary: Negative. Musculoskeletal: Negative for arthralgias and myalgias. Neurological: Negative. Objective:   Physical Exam  Constitutional:       Appearance: He is well-developed. Neck:      Vascular: No carotid bruit or JVD. Cardiovascular:      Rate and Rhythm: Normal rate. Heart sounds: Normal heart sounds. No murmur. Pulmonary:      Effort: Pulmonary effort is normal.      Breath sounds: Normal breath sounds. No wheezing or rales. Skin:     General: Skin is dry.          Assessment:      See Problem List assessment and plan       Plan:     Type 2 diabetes mellitus without complication (HCC)  [id number] not registering on machine  Insulin was adjusted  Continue medications  Check HgbA1c      Essential hypertension  BP stable  Continue present treatment      Tobacco abuse  Discussed

## 2020-02-12 NOTE — PATIENT INSTRUCTIONS
BEHAVIOR GOALS     When to eat: Eat first meal within an hour or two of waking, then have meal or snack every five hours while awake. What and how much to eat:   1) Plan meals to follow Plate Guide, aiming for three servings of vegetables, fruits, and whole grains daily. 2) Aim for 30 - 45 g Carb per meal; 15 - 20 g Carb per snack    Physical Activity: Walk dogs daily    Checking Blood Sugar: Vary testing times to include 2 hours after the start of a meal.    Medications: Take Glipizide with food. If meal is skipped, don't take the glipizide       Keeping Records: Use Blood Sugar log to record readings. Other: Carry Glucose tablets to be prepared to treat low blood sugar.

## 2020-02-12 NOTE — PROGRESS NOTES
Medical Nutrition Therapy for Diabetes    Stevan Young  February 12, 2020      Patient Care Team:  Werner Cruz MD as PCP - General (Internal Medicine)  Werner Cruz MD as PCP - [facility] Empaneled Provider    Reason for visit: \"I'm trying to control BG\"    ASSESSMENT/PLAN:   NUTRITION DIAGNOSIS    #1 Problem: Altered Nutrition-Related Laboratory Values (NC-2.2)  Related to: Endocrine/Diabetes   As Evidenced by: Elevated Plasma glucose and/or HgbA1c levels           #2 Problem: Overweight/Obesity (NC-3.3)  Related to: Excessive energy intake or physical inactivity  As Evidenced by: BMI more than normative standard for age and sex (BMI=28.35)      NUTRITION INTERVENTION  Nutrition Prescription: Aim for 30 - 45 g Carb per meal; 15 - 20 g Carb per snack      Diabetes Education/Counseling included:  Carbohydrate Control, Activity/exercise, Monitoring, Medications and Hypoyglycemia prevention/treatment    Interventions:  Control Carbohydrate Intake using Plate Guide, Control Carbohydrate Intake using Carb Counting and Increase activity level by walking    NUTRITION MONITORING AND EVALUATION    Patient Instructions   BEHAVIOR GOALS     When to eat: Eat first meal within an hour or two of waking, then have meal or snack every five hours while awake. What and how much to eat:   1) Plan meals to follow Plate Guide, aiming for three servings of vegetables, fruits, and whole grains daily. 2) Aim for 30 - 45 g Carb per meal; 15 - 20 g Carb per snack    Physical Activity: Walk dogs daily    Checking Blood Sugar: Vary testing times to include 2 hours after the start of a meal.    Medications: Take Glipizide with food. If meal is skipped, don't take the glipizide       Keeping Records: Use Blood Sugar log to record readings. Other: Carry Glucose tablets to be prepared to treat low blood sugar.              Patient Active Problem List   Diagnosis    Hypercholesterolemia    Essential hypertension    Anxiety medications Yes  Knows prescribed schedule for medicationsYes  Recent change in medication type/dosage: Yes--started glipizide when in the donut hole and can't afford Elta Goodie  medications properlyYes  Comments:     Monitoring:   Has BG meter: Yes  Testing frequency: not testing currently  Work  Hypoglycemia? Yes--occasionally when meal is skipped    Anthropometric Measurements: Wt:   Wt Readings from Last 3 Encounters:   02/12/20 209 lb (94.8 kg)   11/12/19 206 lb (93.4 kg)   10/01/19 202 lb 4.8 oz (91.8 kg)      BMI:   BMI Readings from Last 3 Encounters:   02/12/20 28.35 kg/m²   11/12/19 27.94 kg/m²   10/01/19 27.44 kg/m²     Tried [employer]System for 2 months, lost 14 lbs, has regained all but 3 lb   everything tasted blan until I got used to it  Ate 6 times per day  Stopped due to expense    Patient's stated goal weight: 190 lb  7% Weight loss goal weight: 194 lb    Physical Activity History:   Physical activity: not really  Obstacles to activity: busy schedule    Sleep: 9p - 4a (interrupted by trips to the bathroom)    Food and Nutrition History:   Nutrition Awareness/Previous DSMES: no  Beverage consumption: espresso, 40 oz sugar free powerade  Alcohol consumption: 1 - 2 draft beers on Friday evening  Frequency of Meals Eaten away from home:half  Food Availability Problems  Within the past 12 months, have you worried that your food would run out before you got money to buy more? No  Within the past 12 months, has the food you bought not lasted till the end of the month and you didn't have money to get more?  No  Number of people in household: 2 (patient shares in grocery shopping, spouse does cooking)  Usual Food consumption: likes most veg and fruit, but \"I'm a picky eater\"  FOOD RECALL  Up at 4:45 a  First meal--Usual time: skip  Today: nothing  Different Day: instant oatmeal plain    Snack  Occasional candy at work    Second meal--Usual time: noon - 3 p  Recent: Rafi Guzmán's sub  Different Day: Healthy

## 2020-02-13 LAB
ESTIMATED AVERAGE GLUCOSE: 159.9 MG/DL
HBA1C MFR BLD: 7.2 %

## 2020-03-02 RX ORDER — METFORMIN HYDROCHLORIDE 500 MG/1
TABLET, EXTENDED RELEASE ORAL
Qty: 360 TABLET | Refills: 0 | Status: SHIPPED | OUTPATIENT
Start: 2020-03-02 | End: 2020-05-29

## 2020-03-09 RX ORDER — DIAZEPAM 10 MG/1
TABLET ORAL
Qty: 28 TABLET | Refills: 0 | Status: SHIPPED | OUTPATIENT
Start: 2020-03-09 | End: 2020-04-06

## 2020-03-12 ENCOUNTER — OFFICE VISIT (OUTPATIENT)
Dept: INTERNAL MEDICINE CLINIC | Age: 65
End: 2020-03-12
Payer: MEDICARE

## 2020-03-12 VITALS
SYSTOLIC BLOOD PRESSURE: 116 MMHG | DIASTOLIC BLOOD PRESSURE: 64 MMHG | BODY MASS INDEX: 28.75 KG/M2 | HEART RATE: 77 BPM | WEIGHT: 212 LBS | OXYGEN SATURATION: 96 %

## 2020-03-12 PROCEDURE — 4004F PT TOBACCO SCREEN RCVD TLK: CPT | Performed by: INTERNAL MEDICINE

## 2020-03-12 PROCEDURE — G8482 FLU IMMUNIZE ORDER/ADMIN: HCPCS | Performed by: INTERNAL MEDICINE

## 2020-03-12 PROCEDURE — 1123F ACP DISCUSS/DSCN MKR DOCD: CPT | Performed by: INTERNAL MEDICINE

## 2020-03-12 PROCEDURE — G8427 DOCREV CUR MEDS BY ELIG CLIN: HCPCS | Performed by: INTERNAL MEDICINE

## 2020-03-12 PROCEDURE — 99213 OFFICE O/P EST LOW 20 MIN: CPT | Performed by: INTERNAL MEDICINE

## 2020-03-12 PROCEDURE — 4040F PNEUMOC VAC/ADMIN/RCVD: CPT | Performed by: INTERNAL MEDICINE

## 2020-03-12 PROCEDURE — G8417 CALC BMI ABV UP PARAM F/U: HCPCS | Performed by: INTERNAL MEDICINE

## 2020-03-12 PROCEDURE — 3017F COLORECTAL CA SCREEN DOC REV: CPT | Performed by: INTERNAL MEDICINE

## 2020-03-12 RX ORDER — FLUOXETINE HYDROCHLORIDE 20 MG/1
CAPSULE ORAL
Qty: 90 CAPSULE | Refills: 1 | Status: SHIPPED | OUTPATIENT
Start: 2020-03-12 | End: 2020-09-16 | Stop reason: SDUPTHER

## 2020-03-12 RX ORDER — FLUOXETINE 10 MG/1
CAPSULE ORAL
Qty: 90 CAPSULE | Refills: 1 | Status: SHIPPED | OUTPATIENT
Start: 2020-03-12 | End: 2020-09-16 | Stop reason: SDUPTHER

## 2020-03-12 ASSESSMENT — ENCOUNTER SYMPTOMS
WHEEZING: 0
SHORTNESS OF BREATH: 0
GASTROINTESTINAL NEGATIVE: 1
RESPIRATORY NEGATIVE: 1
CHEST TIGHTNESS: 0

## 2020-03-16 RX ORDER — IBUPROFEN 600 MG/1
TABLET ORAL
Qty: 120 TABLET | Refills: 0 | Status: SHIPPED | OUTPATIENT
Start: 2020-03-16 | End: 2020-04-29

## 2020-03-30 RX ORDER — ATORVASTATIN CALCIUM 40 MG/1
TABLET, FILM COATED ORAL
Qty: 90 TABLET | Refills: 0 | Status: SHIPPED | OUTPATIENT
Start: 2020-03-30 | End: 2020-06-25

## 2020-04-06 RX ORDER — DIAZEPAM 10 MG/1
TABLET ORAL
Qty: 28 TABLET | Refills: 0 | Status: SHIPPED | OUTPATIENT
Start: 2020-04-06 | End: 2020-05-01

## 2020-04-29 RX ORDER — IBUPROFEN 600 MG/1
TABLET ORAL
Qty: 120 TABLET | Refills: 0 | Status: SHIPPED | OUTPATIENT
Start: 2020-04-29 | End: 2020-06-12

## 2020-05-01 RX ORDER — DIAZEPAM 10 MG/1
TABLET ORAL
Qty: 28 TABLET | Refills: 0 | Status: SHIPPED | OUTPATIENT
Start: 2020-05-01 | End: 2020-06-01

## 2020-05-04 RX ORDER — LISINOPRIL 20 MG/1
TABLET ORAL
Qty: 90 TABLET | Refills: 0 | Status: SHIPPED | OUTPATIENT
Start: 2020-05-04 | End: 2020-08-03

## 2020-05-29 RX ORDER — METFORMIN HYDROCHLORIDE 500 MG/1
TABLET, EXTENDED RELEASE ORAL
Qty: 360 TABLET | Refills: 0 | Status: SHIPPED | OUTPATIENT
Start: 2020-05-29 | End: 2020-08-05

## 2020-06-01 RX ORDER — DIAZEPAM 10 MG/1
TABLET ORAL
Qty: 28 TABLET | Refills: 0 | Status: SHIPPED | OUTPATIENT
Start: 2020-06-01 | End: 2020-06-29

## 2020-06-12 RX ORDER — IBUPROFEN 600 MG/1
TABLET ORAL
Qty: 120 TABLET | Refills: 0 | Status: SHIPPED | OUTPATIENT
Start: 2020-06-12 | End: 2020-07-23

## 2020-06-16 ENCOUNTER — VIRTUAL VISIT (OUTPATIENT)
Dept: INTERNAL MEDICINE CLINIC | Age: 65
End: 2020-06-16
Payer: MEDICARE

## 2020-06-16 PROCEDURE — 4040F PNEUMOC VAC/ADMIN/RCVD: CPT | Performed by: INTERNAL MEDICINE

## 2020-06-16 PROCEDURE — G8428 CUR MEDS NOT DOCUMENT: HCPCS | Performed by: INTERNAL MEDICINE

## 2020-06-16 PROCEDURE — 3051F HG A1C>EQUAL 7.0%<8.0%: CPT | Performed by: INTERNAL MEDICINE

## 2020-06-16 PROCEDURE — 99213 OFFICE O/P EST LOW 20 MIN: CPT | Performed by: INTERNAL MEDICINE

## 2020-06-16 PROCEDURE — 1123F ACP DISCUSS/DSCN MKR DOCD: CPT | Performed by: INTERNAL MEDICINE

## 2020-06-16 PROCEDURE — 2022F DILAT RTA XM EVC RTNOPTHY: CPT | Performed by: INTERNAL MEDICINE

## 2020-06-16 PROCEDURE — 3017F COLORECTAL CA SCREEN DOC REV: CPT | Performed by: INTERNAL MEDICINE

## 2020-06-16 ASSESSMENT — ENCOUNTER SYMPTOMS
CHEST TIGHTNESS: 0
GASTROINTESTINAL NEGATIVE: 1
RESPIRATORY NEGATIVE: 1
WHEEZING: 0
SHORTNESS OF BREATH: 0

## 2020-06-25 RX ORDER — ATORVASTATIN CALCIUM 40 MG/1
TABLET, FILM COATED ORAL
Qty: 90 TABLET | Refills: 0 | Status: SHIPPED | OUTPATIENT
Start: 2020-06-25 | End: 2020-09-28

## 2020-06-29 RX ORDER — DIAZEPAM 10 MG/1
TABLET ORAL
Qty: 28 TABLET | Refills: 0 | Status: SHIPPED | OUTPATIENT
Start: 2020-06-29 | End: 2020-07-23

## 2020-07-23 RX ORDER — DIAZEPAM 10 MG/1
TABLET ORAL
Qty: 28 TABLET | Refills: 0 | Status: SHIPPED | OUTPATIENT
Start: 2020-07-23 | End: 2020-08-25

## 2020-07-23 RX ORDER — IBUPROFEN 600 MG/1
TABLET ORAL
Qty: 120 TABLET | Refills: 0 | Status: SHIPPED | OUTPATIENT
Start: 2020-07-23 | End: 2020-09-09

## 2020-08-03 RX ORDER — LISINOPRIL 20 MG/1
TABLET ORAL
Qty: 90 TABLET | Refills: 0 | Status: SHIPPED | OUTPATIENT
Start: 2020-08-03 | End: 2020-10-29

## 2020-08-05 RX ORDER — METFORMIN HYDROCHLORIDE 500 MG/1
TABLET, EXTENDED RELEASE ORAL
Qty: 360 TABLET | Refills: 0 | Status: SHIPPED | OUTPATIENT
Start: 2020-08-05 | End: 2020-10-12

## 2020-08-11 ENCOUNTER — OFFICE VISIT (OUTPATIENT)
Dept: PRIMARY CARE CLINIC | Age: 65
End: 2020-08-11
Payer: MEDICARE

## 2020-08-11 PROCEDURE — G8428 CUR MEDS NOT DOCUMENT: HCPCS | Performed by: NURSE PRACTITIONER

## 2020-08-11 PROCEDURE — 99211 OFF/OP EST MAY X REQ PHY/QHP: CPT | Performed by: NURSE PRACTITIONER

## 2020-08-11 PROCEDURE — G8417 CALC BMI ABV UP PARAM F/U: HCPCS | Performed by: NURSE PRACTITIONER

## 2020-08-11 NOTE — PROGRESS NOTES
Jose Naidu received a viral test for COVID-19. They were educated on isolation and quarantine as appropriate. For any symptoms, they were directed to seek care from their PCP, given contact information to establish with a doctor, directed to an urgent care or the emergency room.

## 2020-08-13 LAB
SARS-COV-2: NOT DETECTED
SOURCE: NORMAL

## 2020-08-25 RX ORDER — DIAZEPAM 10 MG/1
TABLET ORAL
Qty: 28 TABLET | Refills: 0 | Status: SHIPPED | OUTPATIENT
Start: 2020-08-25 | End: 2020-09-21

## 2020-09-09 RX ORDER — IBUPROFEN 600 MG/1
TABLET ORAL
Qty: 120 TABLET | Refills: 0 | Status: SHIPPED | OUTPATIENT
Start: 2020-09-09 | End: 2020-10-27 | Stop reason: SDUPTHER

## 2020-09-16 ENCOUNTER — OFFICE VISIT (OUTPATIENT)
Dept: INTERNAL MEDICINE CLINIC | Age: 65
End: 2020-09-16
Payer: MEDICARE

## 2020-09-16 VITALS
OXYGEN SATURATION: 94 % | HEART RATE: 73 BPM | BODY MASS INDEX: 28.62 KG/M2 | WEIGHT: 211 LBS | SYSTOLIC BLOOD PRESSURE: 126 MMHG | DIASTOLIC BLOOD PRESSURE: 70 MMHG | TEMPERATURE: 97.5 F

## 2020-09-16 PROBLEM — R04.0 NASAL BLEEDING: Status: ACTIVE | Noted: 2020-09-16

## 2020-09-16 LAB — HBA1C MFR BLD: 6.3 %

## 2020-09-16 PROCEDURE — 4004F PT TOBACCO SCREEN RCVD TLK: CPT | Performed by: INTERNAL MEDICINE

## 2020-09-16 PROCEDURE — 2022F DILAT RTA XM EVC RTNOPTHY: CPT | Performed by: INTERNAL MEDICINE

## 2020-09-16 PROCEDURE — 3044F HG A1C LEVEL LT 7.0%: CPT | Performed by: INTERNAL MEDICINE

## 2020-09-16 PROCEDURE — G0008 ADMIN INFLUENZA VIRUS VAC: HCPCS | Performed by: INTERNAL MEDICINE

## 2020-09-16 PROCEDURE — G8427 DOCREV CUR MEDS BY ELIG CLIN: HCPCS | Performed by: INTERNAL MEDICINE

## 2020-09-16 PROCEDURE — G8417 CALC BMI ABV UP PARAM F/U: HCPCS | Performed by: INTERNAL MEDICINE

## 2020-09-16 PROCEDURE — 90694 VACC AIIV4 NO PRSRV 0.5ML IM: CPT | Performed by: INTERNAL MEDICINE

## 2020-09-16 PROCEDURE — 83036 HEMOGLOBIN GLYCOSYLATED A1C: CPT | Performed by: INTERNAL MEDICINE

## 2020-09-16 PROCEDURE — 1123F ACP DISCUSS/DSCN MKR DOCD: CPT | Performed by: INTERNAL MEDICINE

## 2020-09-16 PROCEDURE — 4040F PNEUMOC VAC/ADMIN/RCVD: CPT | Performed by: INTERNAL MEDICINE

## 2020-09-16 PROCEDURE — 99214 OFFICE O/P EST MOD 30 MIN: CPT | Performed by: INTERNAL MEDICINE

## 2020-09-16 PROCEDURE — 3017F COLORECTAL CA SCREEN DOC REV: CPT | Performed by: INTERNAL MEDICINE

## 2020-09-16 RX ORDER — FLUOXETINE HYDROCHLORIDE 20 MG/1
CAPSULE ORAL
Qty: 90 CAPSULE | Refills: 1 | Status: SHIPPED | OUTPATIENT
Start: 2020-09-16 | End: 2021-03-17 | Stop reason: SDUPTHER

## 2020-09-16 RX ORDER — FLUOXETINE 10 MG/1
CAPSULE ORAL
Qty: 90 CAPSULE | Refills: 1 | Status: SHIPPED | OUTPATIENT
Start: 2020-09-16 | End: 2021-07-29 | Stop reason: SDUPTHER

## 2020-09-16 NOTE — PROGRESS NOTES
Subjective:      Patient ID: Lula Murry is a 76 y.o. y.o. male. Chief Complaint   Patient presents with    Diabetes       HPI    Patient is here for a recheck    Vitals:    09/16/20 1320   BP: 126/70   Pulse: 73   Temp: 97.5 °F (36.4 °C)   SpO2: 94%       Wt Readings from Last 3 Encounters:   09/16/20 211 lb (95.7 kg)   03/12/20 212 lb (96.2 kg)   02/12/20 209 lb (94.8 kg)     Blood sugar has been good. It is mostly . It has been running good on present medication and treatment  He feels well. Patient is taking blood pressure medication without problem  He is doing well with Valium. He is taking it as directed. He is not taking extra. Patient is taking their cholesterol medication and watching diet without problem. Discussed use, benefit, and side effects of prescribed medications. Barriers to medication compliance addressed. All patient questions answered. Pt voiced understanding. Review of Systems   Constitutional: Negative. HENT: Negative. Respiratory: Negative. Negative for chest tightness, shortness of breath and wheezing. Cardiovascular: Negative. Negative for chest pain, palpitations and leg swelling. Gastrointestinal: Negative. Genitourinary: Negative. Musculoskeletal: Negative for arthralgias and myalgias. Neurological: Negative. Objective:   Physical Exam  Constitutional:       Appearance: He is well-developed. HENT:      Nose: Nose normal.   Neck:      Vascular: No carotid bruit or JVD. Cardiovascular:      Rate and Rhythm: Normal rate. Heart sounds: Normal heart sounds. No murmur. Pulmonary:      Effort: Pulmonary effort is normal.      Breath sounds: Normal breath sounds. No wheezing or rales. Skin:     General: Skin is dry.          Assessment:      See Problem List assessment and plan       Plan:     Type 2 diabetes mellitus without complication ([address]  Sugar great  Continue present treatment  Check labs  Lab Results Component Value Date    LABA1C 6.3 09/16/2020     Lab Results   Component Value Date    .9 02/12/2020         Anxiety state  Doing great  OARRS reviewed  Contract in place  Advised agreement includes medical marijuana      Essential hypertension  BP stable  Continue present treatment      Major depressive disorder with current active episode  Mood stable  Continue present treatment      Nasal bleeding  With sneezing  Saline spray  Exam negative  Call if no better        Patient engaged in shared decision making. Information given to evaluateoptions of treatment, understand what is needed and discuss importance of following plan.

## 2020-09-16 NOTE — ASSESSMENT & PLAN NOTE
Sugar great  Continue present treatment  Check labs  Lab Results   Component Value Date    LABA1C 6.3 09/16/2020     Lab Results   Component Value Date    .9 02/12/2020

## 2020-09-21 RX ORDER — DIAZEPAM 10 MG/1
TABLET ORAL
Qty: 28 TABLET | Refills: 0 | Status: SHIPPED | OUTPATIENT
Start: 2020-09-21 | End: 2020-10-20 | Stop reason: SDUPTHER

## 2020-09-28 RX ORDER — ATORVASTATIN CALCIUM 40 MG/1
TABLET, FILM COATED ORAL
Qty: 90 TABLET | Refills: 0 | Status: SHIPPED | OUTPATIENT
Start: 2020-09-28 | End: 2020-12-21

## 2020-10-12 RX ORDER — METFORMIN HYDROCHLORIDE 500 MG/1
TABLET, EXTENDED RELEASE ORAL
Qty: 360 TABLET | Refills: 0 | Status: SHIPPED | OUTPATIENT
Start: 2020-10-12 | End: 2021-02-05

## 2020-10-19 ENCOUNTER — TELEPHONE (OUTPATIENT)
Dept: INTERNAL MEDICINE CLINIC | Age: 65
End: 2020-10-19

## 2020-10-19 RX ORDER — GLIPIZIDE 10 MG/1
10 TABLET, FILM COATED, EXTENDED RELEASE ORAL DAILY
Qty: 90 TABLET | Refills: 3 | Status: SHIPPED | OUTPATIENT
Start: 2020-10-19 | End: 2021-10-04

## 2020-10-20 ENCOUNTER — TELEPHONE (OUTPATIENT)
Dept: INTERNAL MEDICINE CLINIC | Age: 65
End: 2020-10-20

## 2020-10-20 RX ORDER — DIAZEPAM 10 MG/1
TABLET ORAL
Qty: 28 TABLET | Refills: 0 | Status: SHIPPED | OUTPATIENT
Start: 2020-10-20 | End: 2020-11-19 | Stop reason: SDUPTHER

## 2020-10-20 NOTE — TELEPHONE ENCOUNTER
Patient requesting a medication refill.   Medication diazePAM (VALIUM)  Dosage  10 MG tablet   Frequency TAKE 1 TABLET BY MOUTH NIGHTLY AS NEEDED FOR ANXIETY   Last filled on 9/21/20  [address], [address]

## 2020-10-27 RX ORDER — IBUPROFEN 600 MG/1
TABLET ORAL
Qty: 120 TABLET | Refills: 0 | Status: SHIPPED | OUTPATIENT
Start: 2020-10-27 | End: 2020-12-15 | Stop reason: SDUPTHER

## 2020-10-29 RX ORDER — LISINOPRIL 20 MG/1
TABLET ORAL
Qty: 90 TABLET | Refills: 0 | Status: SHIPPED | OUTPATIENT
Start: 2020-10-29 | End: 2021-01-26

## 2020-11-19 ENCOUNTER — TELEPHONE (OUTPATIENT)
Dept: INTERNAL MEDICINE CLINIC | Age: 65
End: 2020-11-19

## 2020-11-19 RX ORDER — DIAZEPAM 10 MG/1
TABLET ORAL
Qty: 28 TABLET | Refills: 0 | Status: SHIPPED | OUTPATIENT
Start: 2020-11-19 | End: 2020-12-16

## 2020-11-19 NOTE — TELEPHONE ENCOUNTER
Pt calling again about Diazepam refill - he states he is out of this med. If truly declined he would like call back to find out why. Or please phone in - Pharmacy is [facility], phone [phone]. (He asks for call back today please).

## 2020-11-19 NOTE — TELEPHONE ENCOUNTER
----- Message from Siri Rodriguez sent at 11/19/2020  9:40 AM EST -----  Subject: Message to Provider    QUESTIONS  Information for Provider? pt said he has tried several times to get   through to office   no answer when he calls. he said that is prescription for Jostin  was   denied and he would like to know why it was denied. ---------------------------------------------------------------------------  --------------  Deannie Sandhoff INFO  What is the best way for the office to contact you? OK to leave message on   voicemail  Preferred Call Back Phone Number? [phone]  ---------------------------------------------------------------------------  --------------  SCRIPT ANSWERS  Relationship to Patient?  Self

## 2020-11-24 ENCOUNTER — TELEPHONE (OUTPATIENT)
Dept: INTERNAL MEDICINE CLINIC | Age: 65
End: 2020-11-24

## 2020-11-24 RX ORDER — INSULIN GLARGINE 100 [IU]/ML
INJECTION, SOLUTION SUBCUTANEOUS
Qty: 6 PEN | Refills: 3 | Status: SHIPPED | OUTPATIENT
Start: 2020-11-24 | End: 2021-01-25

## 2020-11-24 NOTE — TELEPHONE ENCOUNTER
Patient requesting a medication refill.   Medication insulin glargine (LANTUS SOLOSTAR)   Dosage 100 UNIT/ML injection pen   [id number] UNIT/ML injection pen   Last filled on 10/25/19  [address], [address] [place] [address]

## 2020-12-15 ENCOUNTER — TELEPHONE (OUTPATIENT)
Dept: INTERNAL MEDICINE CLINIC | Age: 65
End: 2020-12-15

## 2020-12-15 RX ORDER — IBUPROFEN 600 MG/1
TABLET ORAL
Qty: 120 TABLET | Refills: 0 | Status: SHIPPED | OUTPATIENT
Start: 2020-12-15 | End: 2020-12-16

## 2020-12-15 NOTE — TELEPHONE ENCOUNTER
Patient requesting a medication refill.   Medication ibuprofen (ADVIL;MOTRIN)  Dosage  600 MG tablet   Frequency: TAKE 1 TABLET BY MOUTH EVERY 6 HOURS WITH FOOD AS NEEDED FOR PAIN  Last filled on 10/27/20  [address], [address] [place] [address] -  [phone]

## 2020-12-15 NOTE — TELEPHONE ENCOUNTER
Pt asking why it is taking so long to get prescriptions filled  He went over a week without insulin  He requested ibuprofen (ADVIL;MOTRIN) 600 MG tablet and diazePAM (VALIUM) 10 MG tablet  The pharmacy says they sent requests but haven't heard from our office  He would like to be contacted

## 2020-12-16 RX ORDER — IBUPROFEN 600 MG/1
TABLET ORAL
Qty: 120 TABLET | Refills: 0 | Status: SHIPPED | OUTPATIENT
Start: 2020-12-16 | End: 2021-03-15

## 2020-12-16 RX ORDER — DIAZEPAM 10 MG/1
TABLET ORAL
Qty: 28 TABLET | Refills: 0 | Status: SHIPPED | OUTPATIENT
Start: 2020-12-16 | End: 2021-01-14

## 2020-12-16 NOTE — TELEPHONE ENCOUNTER
All medication s have been sent in. Verbally called in also. No answer. LMOM with details. Advised pt to send me a MyChart if he doesn't have all of his medications.

## 2020-12-16 NOTE — TELEPHONE ENCOUNTER
Pt calling again to check on refills  He has had to [employer]" all of his refills  He would like to know why he hasn't received a call from anybody in our office

## 2020-12-16 NOTE — TELEPHONE ENCOUNTER
We only got the requests on the 14th, they have been signed. His medications were initially denied because he had not been seen. The insluin request came from the pharmacy on the 24th, and it was signed the same day.

## 2020-12-21 RX ORDER — ATORVASTATIN CALCIUM 40 MG/1
TABLET, FILM COATED ORAL
Qty: 90 TABLET | Refills: 0 | Status: SHIPPED | OUTPATIENT
Start: 2020-12-21 | End: 2021-03-22

## 2020-12-23 ENCOUNTER — OFFICE VISIT (OUTPATIENT)
Dept: INTERNAL MEDICINE CLINIC | Age: 65
End: 2020-12-23
Payer: MEDICARE

## 2020-12-23 VITALS
OXYGEN SATURATION: 98 % | SYSTOLIC BLOOD PRESSURE: 128 MMHG | HEART RATE: 100 BPM | WEIGHT: 210 LBS | HEIGHT: 72 IN | TEMPERATURE: 97.1 F | DIASTOLIC BLOOD PRESSURE: 68 MMHG | BODY MASS INDEX: 28.44 KG/M2

## 2020-12-23 PROCEDURE — 3044F HG A1C LEVEL LT 7.0%: CPT | Performed by: NURSE PRACTITIONER

## 2020-12-23 PROCEDURE — 4040F PNEUMOC VAC/ADMIN/RCVD: CPT | Performed by: NURSE PRACTITIONER

## 2020-12-23 PROCEDURE — 1123F ACP DISCUSS/DSCN MKR DOCD: CPT | Performed by: NURSE PRACTITIONER

## 2020-12-23 PROCEDURE — 3017F COLORECTAL CA SCREEN DOC REV: CPT | Performed by: NURSE PRACTITIONER

## 2020-12-23 PROCEDURE — G0438 PPPS, INITIAL VISIT: HCPCS | Performed by: NURSE PRACTITIONER

## 2020-12-23 PROCEDURE — G8484 FLU IMMUNIZE NO ADMIN: HCPCS | Performed by: NURSE PRACTITIONER

## 2020-12-23 PROCEDURE — G0296 VISIT TO DETERM LDCT ELIG: HCPCS | Performed by: NURSE PRACTITIONER

## 2020-12-23 ASSESSMENT — PATIENT HEALTH QUESTIONNAIRE - PHQ9
SUM OF ALL RESPONSES TO PHQ QUESTIONS 1-9: 0
SUM OF ALL RESPONSES TO PHQ QUESTIONS 1-9: 0
SUM OF ALL RESPONSES TO PHQ9 QUESTIONS 1 & 2: 0
DEPRESSION UNABLE TO ASSESS: FUNCTIONAL CAPACITY MOTIVATION LIMITS ACCURACY
SUM OF ALL RESPONSES TO PHQ QUESTIONS 1-9: 0
2. FEELING DOWN, DEPRESSED OR HOPELESS: 0
1. LITTLE INTEREST OR PLEASURE IN DOING THINGS: 0

## 2020-12-23 ASSESSMENT — LIFESTYLE VARIABLES: HOW OFTEN DO YOU HAVE A DRINK CONTAINING ALCOHOL: 0

## 2020-12-23 NOTE — PATIENT INSTRUCTIONS
What is lung cancer screening? Lung cancer screening is a way in which doctors check the lungs for early signs of cancer in people who have no symptoms of lung cancer. A low-dose CT scan uses much less radiation than a normal CT scan and shows a more detailed image of the lungs than a standard X-ray. The goal of lung cancer screening is to find cancer early, before it has a chance to grow, spread, or cause problems. One large study found that smokers who were screened with low-dose CT scans were less likely to die of lung cancer than those who were screened with standard X-ray. Below is a summary of the things you need to know regarding screening for lung cancer with low-dose computed tomography (LDCT). This is a screening program that involves routine annual screening with LDCT studies of the lung. The LDCTs are done using low-dose radiation that is not thought to increase your cancer risk. If you have other serious medical conditions (other cancers, congestive heart failure) that limit your life expectancy to less than 10 years, you should not undergo lung cancer screening with LDCT. The chance is 20%-60% that the LDCT result will show abnormalities. This would require additional testing which could include repeat imaging or even invasive procedures. Most (about 95%) of \"abnormal\" LDCT results are false in the sense that no lung cancer is ultimately found. Additionally, some (about 10%) of the cancers found would not affect your life expectancy, even if undetected and untreated. If you are still smoking, the single most important thing that you can do to reduce your risk of dying of lung cancer is to quit. For this screening to be covered by Medicare and most other insurers, strict criteria must be met. If you do not meet these criteria, but still wish to undergo LDCT testing, you will be required to sign a waiver indicating your willingness to pay for the scan. Personalized Preventive Plan for Vanessa Gaines - 12/23/2020  Medicare offers a range of preventive health benefits. Some of the tests and screenings are paid in full while other may be subject to a deductible, co-insurance, and/or copay. Some of these benefits include a comprehensive review of your medical history including lifestyle, illnesses that may run in your family, and various assessments and screenings as appropriate. After reviewing your medical record and screening and assessments performed today your provider may have ordered immunizations, labs, imaging, and/or referrals for you. A list of these orders (if applicable) as well as your Preventive Care list are included within your After Visit Summary for your review. Other Preventive Recommendations:    A preventive eye exam performed by an eye specialist is recommended every 1-2 years to screen for glaucoma; cataracts, macular degeneration, and other eye disorders. A preventive dental visit is recommended every 6 months. Try to get at least 150 minutes of exercise per week or 10,000 steps per day on a pedometer . Order or download the FREE \"Exercise & Physical Activity: Your Everyday Guide\" from [employer]. Call 5-119.400.1505 or search [employer] online. You need 5331-9828 mg of calcium and 6178-7151 IU of vitamin D per day. It is possible to meet your calcium requirement with diet alone, but a vitamin D supplement is usually necessary to meet this goal.  When exposed to the sun, use a sunscreen that protects against both UVA and UVB radiation with an SPF of 30 or greater. Reapply every 2 to 3 hours or after sweating, drying off with a towel, or swimming. Always wear a seat belt when traveling in a car. Always wear a helmet when riding a bicycle or motorcycle.

## 2020-12-23 NOTE — ASSESSMENT & PLAN NOTE
The risks related to smoking were reviewed with the patient. Recommend maintaining a smoke-free lifestyle. Products available for smoking cessation were discussed in detail.   Ct lung screen ordered

## 2020-12-23 NOTE — ASSESSMENT & PLAN NOTE
Well exam in office  No concerns  Fasting labs ordered  Hm reviewed  Discussed lung ct, which he will be getting.

## 2020-12-23 NOTE — PROGRESS NOTES
clotrimazole-betamethasone (LOTRISONE) 1-0.05 % cream Apply topically 2 times daily. Yes Dianne Huynh MD   vardenafil (LEVITRA) 20 MG tablet TAKE ONE TABLET BY MOUTH AS NEEDED FOR  ERECTILE  DYSFUNCTION Yes Dianne Huynh MD   glucose blood VI test strips (ASCENSIA AUTODISC VI;ONE TOUCH ULTRA TEST VI) strip 1 each by In Vitro route daily Patient uses One Touch Ultra 2 meter. Patient needs test strips for meter. Testing 2-3 times daily per E11.9 Yes LUCY Griffith CNP   Lancets MISC Patient uses One Touch Ultra 2 meter. Testing 2 -3 times daily per E11.9 Yes LUCY Gomez CNP   NOVOFINE 32G X 6 MM MISC USE WITH INSULIN AT BEDTIME Yes Codie Bahena MD   Loratadine 10 MG CAPS Take 1 tablet by mouth daily Yes Historical Provider, MD   Glucose Blood (BLOOD GLUCOSE TEST STRIPS) STRP Patient is to test twice daily or as directed by physician. Yes Codie Bahena MD   Blood Glucose Monitoring Suppl JENNIFER Patient to test twice daily or advised by doctor Yes Codie Bahena MD   Accu-Chek Multiclix Lancets MISC USE ONE LANCET TO CHECK TWICE DAILY Yes Codie Bahena MD   ACCU-CHEK GEN PLUS strip USE ONE STRIP TO CHECK TWICE DAILY Yes Codie Bahena MD   Loperamide HCl (IMODIUM A-D PO) Take 2 tablets by mouth daily  Yes Historical Provider, MD   aspirin 81 MG tablet Take 81 mg by mouth daily. Yes Historical Provider, MD   EPINEPHrine (EPIPEN) 0.3 MG/0.3ML JENNIFER injection Use as directed for allergic reaction Yes Codie Bahena MD   Multiple Vitamin (MULTIVITAMIN PO) Take  by mouth daily.  Yes Historical Provider, MD         Past Medical History:   Diagnosis Date    Anal cancer ([facility] [address]     Anxiety     Depression     Hyperlipidemia     Lumbar disc disorder     Herniation    Type 2 diabetes mellitus without complication [facility])        Past Surgical History:   Procedure Laterality Date    COLONOSCOPY  1/2012    rectal cancer and hemorrhoidectomy     COLONOSCOPY  10/30/2015 Dr. Jr Lozano, Polyps, mild diverticulosis, hemorrhoids, colon polyps Transverse, descending- 3 Years   [facility] HAND [address] [facility]    Industrial accident   [address]         Family History   Adopted: Yes       CareTeam (Including outside providers/suppliers regularly involved in providing care):   Patient Care Team:  LUCY Narvaez CNP as PCP - General (Nurse Practitioner)  LUCY Narvaez CNP as PCP - [facility] Empaneled Provider    Wt Readings from Last 3 Encounters:   12/23/20 210 lb (95.3 kg)   09/16/20 211 lb (95.7 kg)   03/12/20 212 lb (96.2 kg)     Vitals:    12/23/20 1343   BP: 128/68   Site: Left Upper Arm   Position: Sitting   Cuff Size: Large Adult   Pulse: 100   Temp: 97.1 °F (36.2 °C)   TempSrc: Temporal   SpO2: 98%   Weight: 210 lb (95.3 kg)   Height: 6' (1.829 m)     Body mass index is 28.48 kg/m². Based upon direct observation of the patient, evaluation of cognition reveals recent and remote memory intact.     General Appearance: alert and oriented to person, place and time, well developed and well- nourished, in no acute distress  Skin: warm and dry, no rash or erythema  Head: normocephalic and atraumatic  Eyes: pupils equal, round, and reactive to light, extraocular eye movements intact, conjunctivae normal  ENT: tympanic membrane, external ear and ear canal normal bilaterally, nose without deformity, nasal mucosa and turbinates normal without polyps  Neck: supple and non-tender without mass, no thyromegaly or thyroid nodules, no cervical lymphadenopathy  Pulmonary/Chest: clear to auscultation bilaterally- no wheezes, rales or rhonchi, normal air movement, no respiratory distress  Cardiovascular: normal rate, regular rhythm, normal S1 and S2, no murmurs, rubs, clicks, or gallops, distal pulses intact, no carotid bruits  Abdomen: soft, non-tender, non-distended, normal bowel sounds, no masses or organomegaly Power of  Living Will ACP-Advance Directive ACP-Power of     Not on File Not on File Not on File Not on File      General Health Risk Interventions:  · no concerns        Personalized Preventive Plan   Current Health Maintenance Status  Immunization History   Administered Date(s) Administered    Influenza 11/17/2011, 12/04/2012, 10/02/2013    Influenza Vaccine, unspecified formulation 09/13/2017    Influenza Virus Vaccine 11/03/2014, 11/11/2015, 09/19/2018    Influenza, Quadv, IM, PF (6 mo and older Fluzone, Flulaval, Fluarix, and 3 yrs and older Afluria) 12/20/2016    Influenza, Quadv, adjuvanted, 65 yrs +, IM, PF (Fluad) 09/16/2020    Influenza, Triv, inactivated, subunit, adjuvanted, IM (Fluad 65 yrs and older) 10/01/2019    Pneumococcal Conjugate 13-valent ([id number]) 10/01/2019    Pneumococcal Polysaccharide ([id number]) 10/07/2017    Td, unspecified formulation 09/19/2017    Tdap (Boostrix, Adacel) 09/05/2007        Health Maintenance   Topic Date Due    Low dose CT lung screening  05/14/2009    Colon cancer screen colonoscopy  10/30/2018    Annual Wellness Visit (AWV)  07/02/2019    Diabetic foot exam  11/12/2020    Diabetic microalbuminuria test  11/12/2020    Lipid screen  11/12/2020    Potassium monitoring  11/12/2020    Creatinine monitoring  11/12/2020    Shingles Vaccine (1 of 2) 03/12/2021 (Originally 5/14/2004)    A1C test (Diabetic or Prediabetic)  09/16/2021    Diabetic retinal exam  11/12/2021    Pneumococcal 65+ years Vaccine (2 of 2 - PPSV23) 10/07/2022    DTaP/Tdap/Td vaccine (3 - Td) 09/19/2027    Flu vaccine  Completed    AAA screen  Completed    Hepatitis C screen  Completed    Hepatitis A vaccine  Aged Out    Hib vaccine  Aged Out    Meningococcal (ACWY) vaccine  Aged Out     Recommendations for [employer] Due: see orders and patient instructions/AVS.  .

## 2021-01-09 ENCOUNTER — HOSPITAL ENCOUNTER (OUTPATIENT)
Age: 66
Discharge: HOME | End: 2021-01-09
Payer: MEDICARE

## 2021-01-09 DIAGNOSIS — I10 ESSENTIAL HYPERTENSION: ICD-10-CM

## 2021-01-09 DIAGNOSIS — Z00.00 ENCOUNTER FOR ANNUAL WELLNESS VISIT (AWV) IN MEDICARE PATIENT: ICD-10-CM

## 2021-01-09 DIAGNOSIS — E78.00 HYPERCHOLESTEROLEMIA: ICD-10-CM

## 2021-01-09 DIAGNOSIS — E55.9 VITAMIN D DEFICIENCY: ICD-10-CM

## 2021-01-09 DIAGNOSIS — E11.9 TYPE 2 DIABETES MELLITUS WITHOUT COMPLICATION, WITHOUT LONG-TERM CURRENT USE OF INSULIN (HCC): ICD-10-CM

## 2021-01-09 LAB
A/G RATIO: 1.5 (ref 1.1–2.2)
ALBUMIN SERPL-MCNC: 4.6 G/DL (ref 3.4–5)
ALP BLD-CCNC: 91 U/L (ref 40–129)
ALT SERPL-CCNC: 40 U/L (ref 10–40)
ANION GAP SERPL CALCULATED.3IONS-SCNC: 13 MMOL/L (ref 3–16)
AST SERPL-CCNC: 30 U/L (ref 15–37)
BILIRUB SERPL-MCNC: 0.6 MG/DL (ref 0–1)
BUN BLDV-MCNC: 10 MG/DL (ref 7–20)
CALCIUM SERPL-MCNC: 9.5 MG/DL (ref 8.3–10.6)
CHLORIDE BLD-SCNC: 102 MMOL/L (ref 99–110)
CHOLESTEROL, FASTING: 116 MG/DL (ref 0–199)
CO2: 24 MMOL/L (ref 21–32)
CREAT SERPL-MCNC: 0.7 MG/DL (ref 0.8–1.3)
CREATININE URINE: 250.1 MG/DL (ref 39–259)
ESTIMATED AVERAGE GLUCOSE: 145.6 MG/DL
GFR AFRICAN AMERICAN: >60
GFR NON-AFRICAN AMERICAN: >60
GLOBULIN: 3 G/DL
GLUCOSE BLD-MCNC: 122 MG/DL (ref 70–99)
HBA1C MFR BLD: 6.7 %
HCT VFR BLD CALC: 48.8 % (ref 40.5–52.5)
HDLC SERPL-MCNC: 45 MG/DL (ref 40–60)
HEMOGLOBIN: 17.1 G/DL (ref 13.5–17.5)
LDL CHOLESTEROL CALCULATED: 29 MG/DL
MCH RBC QN AUTO: 31.7 PG (ref 26–34)
MCHC RBC AUTO-ENTMCNC: 35.1 G/DL (ref 31–36)
MCV RBC AUTO: 90.4 FL (ref 80–100)
MICROALBUMIN UR-MCNC: 53.6 MG/DL
MICROALBUMIN/CREAT UR-RTO: 214.3 MG/G (ref 0–30)
PDW BLD-RTO: 13.4 % (ref 12.4–15.4)
PLATELET # BLD: 263 K/UL (ref 135–450)
PMV BLD AUTO: 7.3 FL (ref 5–10.5)
POTASSIUM SERPL-SCNC: 4.4 MMOL/L (ref 3.5–5.1)
PROSTATE SPECIFIC ANTIGEN: 0.67 NG/ML (ref 0–4)
RBC # BLD: 5.4 M/UL (ref 4.2–5.9)
SODIUM BLD-SCNC: 139 MMOL/L (ref 136–145)
TOTAL PROTEIN: 7.6 G/DL (ref 6.4–8.2)
TRIGLYCERIDE, FASTING: 210 MG/DL (ref 0–150)
TSH REFLEX: 1.56 UIU/ML (ref 0.27–4.2)
VITAMIN D 25-HYDROXY: 27 NG/ML
VLDLC SERPL CALC-MCNC: 42 MG/DL
WBC # BLD: 9.3 K/UL (ref 4–11)

## 2021-01-09 PROCEDURE — 82306 VITAMIN D 25 HYDROXY: CPT

## 2021-01-09 PROCEDURE — 85027 COMPLETE CBC AUTOMATED: CPT

## 2021-01-09 PROCEDURE — 84153 ASSAY OF PSA TOTAL: CPT

## 2021-01-09 PROCEDURE — 83036 HEMOGLOBIN GLYCOSYLATED A1C: CPT

## 2021-01-09 PROCEDURE — 36415 COLL VENOUS BLD VENIPUNCTURE: CPT

## 2021-01-09 PROCEDURE — 84443 ASSAY THYROID STIM HORMONE: CPT

## 2021-01-09 PROCEDURE — 80053 COMPREHEN METABOLIC PANEL: CPT

## 2021-01-09 PROCEDURE — 80061 LIPID PANEL: CPT

## 2021-01-09 PROCEDURE — 82570 ASSAY OF URINE CREATININE: CPT

## 2021-01-09 PROCEDURE — 82043 UR ALBUMIN QUANTITATIVE: CPT

## 2021-01-13 DIAGNOSIS — F41.9 ANXIETY: ICD-10-CM

## 2021-01-14 ENCOUNTER — TELEPHONE (OUTPATIENT)
Dept: INTERNAL MEDICINE CLINIC | Age: 66
End: 2021-01-14

## 2021-01-14 RX ORDER — DIAZEPAM 10 MG/1
TABLET ORAL
Qty: 28 TABLET | Refills: 0 | Status: SHIPPED | OUTPATIENT
Start: 2021-01-14 | End: 2021-02-10

## 2021-01-14 NOTE — TELEPHONE ENCOUNTER
Pt will be out of diazePAM (VALIUM) 10 MG tablet today   He's asking for a refill now so he doesn't go without over the weekend  He will be sending a [email or web address] message with questions regarding his labs   He doesn't know what some of the results mean including too much protein in his urine

## 2021-01-24 DIAGNOSIS — E11.9 TYPE 2 DIABETES MELLITUS WITHOUT COMPLICATION, UNSPECIFIED WHETHER LONG TERM INSULIN USE (HCC): ICD-10-CM

## 2021-01-25 RX ORDER — INSULIN GLARGINE 100 [IU]/ML
INJECTION, SOLUTION SUBCUTANEOUS
Qty: 15 ML | Refills: 0 | Status: SHIPPED | OUTPATIENT
Start: 2021-01-25 | End: 2021-04-01

## 2021-01-26 RX ORDER — LISINOPRIL 20 MG/1
TABLET ORAL
Qty: 90 TABLET | Refills: 0 | Status: SHIPPED | OUTPATIENT
Start: 2021-01-26 | End: 2021-04-27

## 2021-02-05 DIAGNOSIS — E11.9 TYPE 2 DIABETES MELLITUS WITHOUT COMPLICATION, UNSPECIFIED WHETHER LONG TERM INSULIN USE (HCC): ICD-10-CM

## 2021-02-05 RX ORDER — METFORMIN HYDROCHLORIDE 500 MG/1
TABLET, EXTENDED RELEASE ORAL
Qty: 360 TABLET | Refills: 0 | Status: SHIPPED | OUTPATIENT
Start: 2021-02-05 | End: 2021-06-04

## 2021-02-10 DIAGNOSIS — F41.9 ANXIETY: ICD-10-CM

## 2021-02-10 RX ORDER — DIAZEPAM 10 MG/1
TABLET ORAL
Qty: 28 TABLET | Refills: 0 | Status: SHIPPED | OUTPATIENT
Start: 2021-02-10 | End: 2021-03-08

## 2021-03-05 DIAGNOSIS — F41.9 ANXIETY: ICD-10-CM

## 2021-03-08 ENCOUNTER — TELEPHONE (OUTPATIENT)
Dept: INTERNAL MEDICINE CLINIC | Age: 66
End: 2021-03-08

## 2021-03-08 RX ORDER — DIAZEPAM 10 MG/1
TABLET ORAL
Qty: 28 TABLET | Refills: 0 | Status: SHIPPED | OUTPATIENT
Start: 2021-03-08 | End: 2021-04-01

## 2021-03-08 NOTE — TELEPHONE ENCOUNTER
Pt checking on his diazePAM (VALIUM) 10 MG tablet refill    He also wants Bella to know he is scheduling his lung CT

## 2021-03-15 RX ORDER — IBUPROFEN 600 MG/1
TABLET ORAL
Qty: 120 TABLET | Refills: 0 | Status: SHIPPED | OUTPATIENT
Start: 2021-03-15 | End: 2021-05-03

## 2021-03-16 ENCOUNTER — HOSPITAL ENCOUNTER (OUTPATIENT)
Dept: CT IMAGING | Age: 66
Discharge: HOME | End: 2021-03-16
Payer: MEDICARE

## 2021-03-16 DIAGNOSIS — Z87.891 PERSONAL HISTORY OF TOBACCO USE: ICD-10-CM

## 2021-03-16 PROCEDURE — 71271 CT THORAX LUNG CANCER SCR C-: CPT

## 2021-03-17 ENCOUNTER — TELEPHONE (OUTPATIENT)
Dept: INTERNAL MEDICINE CLINIC | Age: 66
End: 2021-03-17

## 2021-03-17 RX ORDER — FLUOXETINE HYDROCHLORIDE 20 MG/1
CAPSULE ORAL
Qty: 90 CAPSULE | Refills: 1 | Status: SHIPPED | OUTPATIENT
Start: 2021-03-17 | End: 2021-07-26 | Stop reason: SDUPTHER

## 2021-03-17 NOTE — TELEPHONE ENCOUNTER
Patient requesting a medication refill.   Medication FLUoxetine (PROZAC)   Dosage 20 MG capsule   FrequencyTAKE 1 CAPSULE BY MOUTH ONCE DAILY  Last filled on 9/16/20  [address], [address] [place] [address]

## 2021-03-17 NOTE — TELEPHONE ENCOUNTER
LOV: 12/23/20  FOV: 3/23/21      Pt received his 10 MG but not the 20 MG    Please contactt once sent

## 2021-03-22 RX ORDER — ATORVASTATIN CALCIUM 40 MG/1
TABLET, FILM COATED ORAL
Qty: 90 TABLET | Refills: 0 | Status: SHIPPED | OUTPATIENT
Start: 2021-03-22 | End: 2021-06-14

## 2021-03-23 ENCOUNTER — OFFICE VISIT (OUTPATIENT)
Dept: INTERNAL MEDICINE CLINIC | Age: 66
End: 2021-03-23
Payer: MEDICARE

## 2021-03-23 VITALS
HEIGHT: 72 IN | WEIGHT: 212 LBS | HEART RATE: 68 BPM | DIASTOLIC BLOOD PRESSURE: 68 MMHG | BODY MASS INDEX: 28.71 KG/M2 | SYSTOLIC BLOOD PRESSURE: 124 MMHG | OXYGEN SATURATION: 94 %

## 2021-03-23 DIAGNOSIS — Z72.0 TOBACCO ABUSE: ICD-10-CM

## 2021-03-23 DIAGNOSIS — F41.1 ANXIETY STATE: ICD-10-CM

## 2021-03-23 DIAGNOSIS — I10 ESSENTIAL HYPERTENSION: ICD-10-CM

## 2021-03-23 DIAGNOSIS — E11.9 TYPE 2 DIABETES MELLITUS WITHOUT COMPLICATION, WITHOUT LONG-TERM CURRENT USE OF INSULIN (HCC): ICD-10-CM

## 2021-03-23 DIAGNOSIS — E78.00 HYPERCHOLESTEROLEMIA: ICD-10-CM

## 2021-03-23 PROCEDURE — 3044F HG A1C LEVEL LT 7.0%: CPT | Performed by: NURSE PRACTITIONER

## 2021-03-23 PROCEDURE — 1123F ACP DISCUSS/DSCN MKR DOCD: CPT | Performed by: NURSE PRACTITIONER

## 2021-03-23 PROCEDURE — G8427 DOCREV CUR MEDS BY ELIG CLIN: HCPCS | Performed by: NURSE PRACTITIONER

## 2021-03-23 PROCEDURE — 4004F PT TOBACCO SCREEN RCVD TLK: CPT | Performed by: NURSE PRACTITIONER

## 2021-03-23 PROCEDURE — G8484 FLU IMMUNIZE NO ADMIN: HCPCS | Performed by: NURSE PRACTITIONER

## 2021-03-23 PROCEDURE — 2022F DILAT RTA XM EVC RTNOPTHY: CPT | Performed by: NURSE PRACTITIONER

## 2021-03-23 PROCEDURE — 4040F PNEUMOC VAC/ADMIN/RCVD: CPT | Performed by: NURSE PRACTITIONER

## 2021-03-23 PROCEDURE — 99214 OFFICE O/P EST MOD 30 MIN: CPT | Performed by: NURSE PRACTITIONER

## 2021-03-23 PROCEDURE — 3017F COLORECTAL CA SCREEN DOC REV: CPT | Performed by: NURSE PRACTITIONER

## 2021-03-23 PROCEDURE — G8417 CALC BMI ABV UP PARAM F/U: HCPCS | Performed by: NURSE PRACTITIONER

## 2021-03-23 RX ORDER — VARENICLINE TARTRATE 0.5 MG/1
.5-1 TABLET, FILM COATED ORAL SEE ADMIN INSTRUCTIONS
Qty: 57 TABLET | Refills: 0 | Status: SHIPPED | OUTPATIENT
Start: 2021-03-23 | End: 2021-06-16 | Stop reason: ALTCHOICE

## 2021-03-23 ASSESSMENT — PATIENT HEALTH QUESTIONNAIRE - PHQ9
1. LITTLE INTEREST OR PLEASURE IN DOING THINGS: 0
SUM OF ALL RESPONSES TO PHQ QUESTIONS 1-9: 0
SUM OF ALL RESPONSES TO PHQ9 QUESTIONS 1 & 2: 0

## 2021-03-23 ASSESSMENT — ENCOUNTER SYMPTOMS
VOMITING: 0
SINUS PRESSURE: 0
CHEST TIGHTNESS: 0
EYE ITCHING: 0
ABDOMINAL PAIN: 0
EYE REDNESS: 0
COLOR CHANGE: 0
WHEEZING: 0
CONSTIPATION: 0
DIARRHEA: 0
RHINORRHEA: 0
COUGH: 0
BLOOD IN STOOL: 0
SHORTNESS OF BREATH: 0
SORE THROAT: 0
NAUSEA: 0
BACK PAIN: 0

## 2021-03-23 NOTE — PATIENT INSTRUCTIONS
Patient Education        varenicline  Pronunciation:  paul Boggs  Brand:  Chantix  What is the most important information I should know about varenicline? When you stop smoking, you may have nicotine withdrawal symptoms with or without using medication such as varenicline. This includes feeling restless, depressed, angry, frustrated, or irritated. Stop taking varenicline and call your doctor if you have if you feel depressed, agitated, hostile, aggressive, or have thoughts about suicide or hurting yourself. Do not drink large amounts alcohol. Varenicline can increase the effects of alcohol or change the way you react to it. What is varenicline? Varenicline is a smoking cessation medicine. It is used together with behavior modification and counseling support to help you stop smoking. Varenicline may also be used for purposes not listed in this medication guide. What should I discuss with my health care provider before taking varenicline? You should not use varenicline if you used it in the past and had:  · a serious allergic reaction --trouble breathing, swelling in your face (lips, tongue, throat) or neck; or  · a serious skin reaction --blisters in your mouth, peeling skin rash. Tell your doctor if you have ever had:  · depression or mental illness;  · a seizure;  · kidney disease (or if you are on dialysis);  · heart or blood vessel problems; or  · if you drink alcohol. Tell your doctor if you are pregnant. It is not known whether varenicline will harm an unborn baby if you use the medicine during pregnancy. However, smoking while you are pregnant can harm the unborn baby or cause birth defects. If you breast-feed while using this medicine, your baby may spit up or vomit more than normal, and may have a seizure. Varenicline is not approved for use by anyone younger than 25years old. How should I take varenicline?   Follow all directions on your prescription label and read all medication guides or instruction sheets. Use the medicine exactly as directed. When you first start taking varenicline, you will take a low dose and then gradually increase it over the first several days of treatment. Take varenicline regularly to get the most benefit. You may choose from 3 ways to use varenicline. Ask your doctor which method is best for you:  · Set a date to quit smoking and start taking varenicline 1 week before that date. Make sure to quit smoking on your planned quit date. Take varenicline for a total of 12 weeks. · Start taking varenicline before you set a planned quit date, and choose a quit date that is between 8 and 35 days after you start treatment. Take varenicline for a total of 12 weeks. · Start taking varenicline and gradually reduce the number of cigarettes you smoke each day over a 12-week period, until you no longer smoke at all. Then take varenicline for another 12 weeks, for a total of 24 weeks. Take varenicline after eating. Take the medicine with a full glass of water. When you stop smoking, you may have nicotine withdrawal symptoms with or without using medication such as varenicline. Withdrawal symptoms include: increased appetite, weight gain, trouble sleeping, slower heart rate, feeling anxious or restless, and having the urge to smoke. Smoking cessation may also cause new or worsening mental health problems, such as depression. Stop taking varenicline and call your doctor if you have if you feel depressed, agitated, hostile, aggressive, or have thoughts about suicide or hurting yourself. Store at room temperature away from moisture and heat. What happens if I miss a dose? Take the medicine as soon as you can, but skip the missed dose if it is almost time for your next dose. Do not take two doses at one time. Get your prescription refilled before you run out of medicine completely. What happens if I overdose?   Seek emergency medical attention or call the Poison Help line at 1-891.851.7184. What should I avoid while taking varenicline? Do not drink large amounts alcohol while taking this medicine. Varenicline can increase the effects of alcohol or change the way you react to it. Some people taking varenicline have had unusual or aggressive behavior or forgetfulness while drinking alcohol. Do not use other medicines to quit smoking, unless your doctor tells you to. Using varenicline while wearing a nicotine patch can cause unpleasant side effects. Avoid driving or hazardous activity until you know how this medicine will affect you. Your reactions could be impaired. What are the possible side effects of varenicline? Get emergency medical help if you have signs of an allergic reaction (hives, difficult breathing, swelling in your face or throat) or a severe skin reaction (fever, sore throat, burning eyes, skin pain, red or purple skin rash with blistering and peeling). Stop using varenicline and call your doctor at once if you have:  · a seizure (convulsions);  · thoughts about suicide or hurting yourself;  · strange dreams, sleepwalking, trouble sleeping;  · new or worsening mental health problems --mood or behavior changes, depression, agitation, hostility, aggression;  · heart attack symptoms --chest pain or pressure, pain spreading to your jaw or shoulder, nausea, sweating; o  · stroke symptoms --sudden numbness or weakness (especially on one side of the body), slurred speech, problems with vision or balance. Your family or other caregivers should also be alert to changes in your mood or behavior. Common side effects may include:  · nausea (may persist for several months), vomiting;  · constipation, gas;  · sleep problems (insomnia); or  · unusual dreams. This is not a complete list of side effects and others may occur. Call your doctor for medical advice about side effects. You may report side effects to FDA at 2-561-HVL-9330.   What other drugs will affect varenicline? After you stop smoking, your doctor may need to adjust the doses of certain medicines you take on a regular basis. Tell your doctor about all your current medicines and any medicine you start or stop using. This includes prescription and over-the-counter medicines, vitamins, and herbal products. Not all possible interactions are listed here. Where can I get more information? Your pharmacist can provide more information about varenicline. Remember, keep this and all other medicines out of the reach of children, never share your medicines with others, and use this medication only for the indication prescribed. Every effort has been made to ensure that the information provided [facility] is accurate, up-to-date, and complete, but no guarantee is made to that effect. Drug information contained herein may be time sensitive. [facility] information has been compiled for use by healthcare practitioners and consumers in the [place] and therefore [facility] does not warrant that uses outside of the [place] are appropriate, unless specifically indicated otherwise. [facility]'s drug information does not endorse drugs, diagnose patients or recommend therapy. [facility] drug information is an informational resource designed to assist licensed healthcare practitioners in caring for their patients and/or to serve consumers viewing this service as a supplement to, and not a substitute for, the expertise, skill, knowledge and judgment of healthcare practitioners. The absence of a warning for a given drug or drug combination in no way should be construed to indicate that the drug or drug combination is safe, effective or appropriate for any given patient. [facility] does not assume any responsibility for any aspect of healthcare administered with the aid of information [facility] provides.  The information contained herein is not intended to cover all possible uses, directions, precautions, warnings, drug interactions, allergic reactions, or adverse effects. If you have questions about the drugs you are taking, check with your doctor, nurse or pharmacist.  Copyright 1416-9118 [address]. Version: 10.01. Revision date: 8/8/2018. Care instructions adapted under license by [facility] [address] If you have questions about a medical condition or this instruction, always ask your healthcare professional. [place] [address] any warranty or liability for your use of this information.

## 2021-03-23 NOTE — ASSESSMENT & PLAN NOTE
The risks related to smoking were reviewed with the patient. Recommend maintaining a smoke-free lifestyle. Products available for smoking cessation were discussed in detail. Patient feels ready to move forward with cessation plan  He is agreeable to start chantix  Starter pack discussed and sent to pharmacy  Will follow up in 4 weeks to see how he is doing.

## 2021-03-23 NOTE — PROGRESS NOTES
Franki Torrez (:  1955) is a 76 y.o. male,Established patient, here for evaluation of the following chief complaint(s):  Hyperlipidemia, Hypertension, and Diabetes      ASSESSMENT/PLAN:  1. Type 2 diabetes mellitus without complication, without long-term current use of insulin ([facility] [address]  Assessment & Plan:  Recent labs show excellent control  He is doing well on current treatment plan  No changes at this time, continue plan   Lab Results   Component Value Date    LABA1C 6.7 2021     Lab Results   Component Value Date    .6 2021       2. Essential hypertension  Assessment & Plan:  BP has been well controlled  Doing well on medications  No changes, continue treatment plan  3. Hypercholesterolemia  Assessment & Plan:  Cholesterol is excellent! Continue lipitor  No changes continue treatment   4. Tobacco abuse  Assessment & Plan:  The risks related to smoking were reviewed with the patient. Recommend maintaining a smoke-free lifestyle. Products available for smoking cessation were discussed in detail. Patient feels ready to move forward with cessation plan  He is agreeable to start chantix  Starter pack discussed and sent to pharmacy  Will follow up in 4 weeks to see how he is doing. 5. Anxiety state  Assessment & Plan:  Doing well on current medications  No concerns  Continue treatment plan  oarrs reviewed       No follow-ups on file. SUBJECTIVE/OBJECTIVE:  ALEXX  Anshul Guillermo is here today for follow up of HTN, HLD, DM, and anxiety. He also reports that he is interested in tobacco cessation and would like to discuss ED. He has been doing very well with his chronic medical conditions. He has been taking his medications as prescribed without any issues or side effects. Recent labs show excellent LDL and a1c control. He has no concerns regarding his chronic medical conditions. He recently had an updated colonoscopy, and has completed his first COVID vaccine.  He recently did have his low dose lung screening and results were great. He continues to smoke appx 2 ppd. He has quite cold turkey in the past for about a year, but then had stress with work, picked one up and has not stopped since. He has tried nicoderm in the past, but this was not effective. He states that his wife is a smoker as well, so this makes it challenging for him to quit. He is interested in options to help quit. He also mentions ED. He states that he has some decreased libido as he ages, but the desire is still there.  He has tried ED meds in the past, but had an adverse reaction with an erection lasting over 24 hours so he has no desire to try medication again, just looking for reassurance that he is \"normal\"  Current Outpatient Medications   Medication Sig Dispense Refill    varenicline (CHANTIX) 0.5 MG tablet Take 1-2 tablets by mouth See Admin Instructions 0.5mg DAILY for 3 days followed by 0.5mg TWICE DAILY for 4 days followed by 1mg TWICE DAILY 57 tablet 0    atorvastatin (LIPITOR) 40 MG tablet Take 1 tablet by mouth once daily 90 tablet 0    FLUoxetine (PROZAC) 20 MG capsule TAKE 1 CAPSULE BY MOUTH ONCE DAILY 90 capsule 1    ibuprofen (ADVIL;MOTRIN) 600 MG tablet TAKE 1 TABLET BY MOUTH EVERY 6 HOURS WITH FOOD AS NEEDED FOR PAIN 120 tablet 0    diazePAM (VALIUM) 10 MG tablet TAKE 1 TABLET BY MOUTH NIGHTLY AS NEEDED FOR ANXIETY 28 tablet 0    metFORMIN (GLUCOPHAGE-XR) 500 MG extended release tablet TAKE 4 TABLETS BY MOUTH ONCE DAILY WITH BREAKFAST 360 tablet 0    lisinopril (PRINIVIL;ZESTRIL) 20 MG tablet Take 1 tablet by mouth once daily 90 tablet 0    LANTUS SOLOSTAR 100 UNIT/ML injection pen INJECT 35 UNITS SUBCUTANEOUSLY NIGHTLY 15 mL 0    glipiZIDE (GLIPIZIDE XL) 10 MG extended release tablet Take 1 tablet by mouth daily 90 tablet 3    FLUoxetine (PROZAC) 10 MG capsule TAKE 1 CAPSULE BY MOUTH ONCE DAILY 90 capsule 1    ketoconazole (NIZORAL) 2 % cream APPLY  CREAM TOPICALLY TWICE DAILY 30 g 1    clotrimazole-betamethasone (LOTRISONE) 1-0.05 % cream Apply topically 2 times daily. 15 g 1    vardenafil (LEVITRA) 20 MG tablet TAKE ONE TABLET BY MOUTH AS NEEDED FOR  ERECTILE  DYSFUNCTION 10 tablet 0    glucose blood VI test strips (ASCENSIA AUTODISC VI;ONE TOUCH ULTRA TEST VI) strip 1 each by In Vitro route daily Patient uses One Touch Ultra 2 meter. Patient needs test strips for meter. Testing 2-3 times daily per E11.9 100 each 3    Lancets MISC Patient uses One Touch Ultra 2 meter. Testing 2 -3 times daily per E11.9 100 each 3    NOVOFINE 32G X 6 MM MISC USE WITH INSULIN AT BEDTIME 100 each 3    Loratadine 10 MG CAPS Take 1 tablet by mouth daily      Glucose Blood (BLOOD GLUCOSE TEST STRIPS) STRP Patient is to test twice daily or as directed by physician. 100 strip 3    Blood Glucose Monitoring Suppl JENNIFER Patient to test twice daily or advised by doctor 1 Device 0    Accu-Chek Multiclix Lancets MISC USE ONE LANCET TO CHECK TWICE DAILY 100 each 3    ACCU-CHEK GEN PLUS strip USE ONE STRIP TO CHECK TWICE DAILY 100 each 3    Loperamide HCl (IMODIUM A-D PO) Take 2 tablets by mouth daily       aspirin 81 MG tablet Take 81 mg by mouth daily.  EPINEPHrine (EPIPEN) 0.3 MG/0.3ML JENNIFER injection Use as directed for allergic reaction 2 Device 3    Multiple Vitamin (MULTIVITAMIN PO) Take  by mouth daily. No current facility-administered medications for this visit. Review of Systems   Constitutional: Negative for chills, fatigue and fever. HENT: Negative for congestion, ear pain, postnasal drip, rhinorrhea, sinus pressure, sneezing and sore throat. Eyes: Negative for redness and itching. Respiratory: Negative for cough, chest tightness, shortness of breath and wheezing. Cardiovascular: Negative for chest pain and palpitations. Gastrointestinal: Negative for abdominal pain, blood in stool, constipation, diarrhea, nausea and vomiting.    Endocrine: Negative for cold intolerance adenopathy. Skin:     General: Skin is warm and dry. Neurological:      Mental Status: He is alert. Psychiatric:         Mood and Affect: Mood normal.         Behavior: Behavior normal.           On this date 3/23/2021 I have spent 40 minutes reviewing previous notes, test results and face to face with the patient discussing the diagnosis and importance of compliance with the treatment plan as well as documenting on the day of the visit. An electronic signature was used to authenticate this note.     --LUCY Pendleton - CNP

## 2021-03-23 NOTE — ASSESSMENT & PLAN NOTE
Recent labs show excellent control  He is doing well on current treatment plan  No changes at this time, continue plan   Lab Results   Component Value Date    LABA1C 6.7 01/09/2021     Lab Results   Component Value Date    .6 01/09/2021

## 2021-03-31 DIAGNOSIS — E11.9 TYPE 2 DIABETES MELLITUS WITHOUT COMPLICATION, UNSPECIFIED WHETHER LONG TERM INSULIN USE (HCC): ICD-10-CM

## 2021-04-01 DIAGNOSIS — F41.9 ANXIETY: ICD-10-CM

## 2021-04-01 RX ORDER — INSULIN GLARGINE 100 [IU]/ML
INJECTION, SOLUTION SUBCUTANEOUS
Qty: 15 ML | Refills: 0 | Status: SHIPPED | OUTPATIENT
Start: 2021-04-01 | End: 2021-07-01

## 2021-04-01 RX ORDER — DIAZEPAM 10 MG/1
TABLET ORAL
Qty: 28 TABLET | Refills: 0 | Status: SHIPPED | OUTPATIENT
Start: 2021-04-01 | End: 2021-04-29

## 2021-04-27 RX ORDER — LISINOPRIL 20 MG/1
TABLET ORAL
Qty: 90 TABLET | Refills: 0 | Status: SHIPPED | OUTPATIENT
Start: 2021-04-27 | End: 2021-07-26

## 2021-04-29 DIAGNOSIS — F41.9 ANXIETY: ICD-10-CM

## 2021-04-29 RX ORDER — DIAZEPAM 10 MG/1
TABLET ORAL
Qty: 28 TABLET | Refills: 0 | Status: SHIPPED | OUTPATIENT
Start: 2021-04-29 | End: 2021-05-26

## 2021-05-03 RX ORDER — IBUPROFEN 600 MG/1
TABLET ORAL
Qty: 120 TABLET | Refills: 0 | Status: SHIPPED | OUTPATIENT
Start: 2021-05-03 | End: 2021-06-08

## 2021-05-26 DIAGNOSIS — F41.9 ANXIETY: ICD-10-CM

## 2021-05-26 RX ORDER — DIAZEPAM 10 MG/1
TABLET ORAL
Qty: 28 TABLET | Refills: 0 | Status: SHIPPED | OUTPATIENT
Start: 2021-05-26 | End: 2021-06-16 | Stop reason: SDUPTHER

## 2021-06-04 DIAGNOSIS — E11.9 TYPE 2 DIABETES MELLITUS WITHOUT COMPLICATION, UNSPECIFIED WHETHER LONG TERM INSULIN USE (HCC): ICD-10-CM

## 2021-06-04 RX ORDER — METFORMIN HYDROCHLORIDE 500 MG/1
TABLET, EXTENDED RELEASE ORAL
Qty: 360 TABLET | Refills: 0 | Status: SHIPPED | OUTPATIENT
Start: 2021-06-04 | End: 2021-09-01

## 2021-06-08 RX ORDER — IBUPROFEN 600 MG/1
TABLET ORAL
Qty: 120 TABLET | Refills: 0 | Status: SHIPPED | OUTPATIENT
Start: 2021-06-08 | End: 2021-07-12

## 2021-06-14 RX ORDER — ATORVASTATIN CALCIUM 40 MG/1
TABLET, FILM COATED ORAL
Qty: 90 TABLET | Refills: 0 | Status: SHIPPED | OUTPATIENT
Start: 2021-06-14 | End: 2021-09-20

## 2021-06-16 ENCOUNTER — OFFICE VISIT (OUTPATIENT)
Dept: INTERNAL MEDICINE CLINIC | Age: 66
End: 2021-06-16
Payer: MEDICARE

## 2021-06-16 VITALS
SYSTOLIC BLOOD PRESSURE: 136 MMHG | DIASTOLIC BLOOD PRESSURE: 64 MMHG | WEIGHT: 212.2 LBS | HEART RATE: 77 BPM | BODY MASS INDEX: 28.78 KG/M2 | OXYGEN SATURATION: 96 %

## 2021-06-16 DIAGNOSIS — E11.69 TYPE 2 DIABETES MELLITUS WITH OTHER SPECIFIED COMPLICATION, WITH LONG-TERM CURRENT USE OF INSULIN (HCC): ICD-10-CM

## 2021-06-16 DIAGNOSIS — F41.9 ANXIETY: ICD-10-CM

## 2021-06-16 DIAGNOSIS — F41.1 ANXIETY STATE: ICD-10-CM

## 2021-06-16 DIAGNOSIS — Z72.0 TOBACCO ABUSE: ICD-10-CM

## 2021-06-16 DIAGNOSIS — F33.1 MODERATE EPISODE OF RECURRENT MAJOR DEPRESSIVE DISORDER (HCC): Primary | ICD-10-CM

## 2021-06-16 DIAGNOSIS — E78.00 HYPERCHOLESTEROLEMIA: ICD-10-CM

## 2021-06-16 DIAGNOSIS — I10 ESSENTIAL HYPERTENSION: ICD-10-CM

## 2021-06-16 DIAGNOSIS — Z79.4 TYPE 2 DIABETES MELLITUS WITH OTHER SPECIFIED COMPLICATION, WITH LONG-TERM CURRENT USE OF INSULIN (HCC): ICD-10-CM

## 2021-06-16 PROBLEM — M75.81 RIGHT ROTATOR CUFF TENDINITIS: Status: RESOLVED | Noted: 2019-07-09 | Resolved: 2021-06-16

## 2021-06-16 PROBLEM — R04.0 NASAL BLEEDING: Status: RESOLVED | Noted: 2020-09-16 | Resolved: 2021-06-16

## 2021-06-16 PROCEDURE — G8417 CALC BMI ABV UP PARAM F/U: HCPCS | Performed by: NURSE PRACTITIONER

## 2021-06-16 PROCEDURE — 1123F ACP DISCUSS/DSCN MKR DOCD: CPT | Performed by: NURSE PRACTITIONER

## 2021-06-16 PROCEDURE — G8427 DOCREV CUR MEDS BY ELIG CLIN: HCPCS | Performed by: NURSE PRACTITIONER

## 2021-06-16 PROCEDURE — 4004F PT TOBACCO SCREEN RCVD TLK: CPT | Performed by: NURSE PRACTITIONER

## 2021-06-16 PROCEDURE — 3044F HG A1C LEVEL LT 7.0%: CPT | Performed by: NURSE PRACTITIONER

## 2021-06-16 PROCEDURE — 4040F PNEUMOC VAC/ADMIN/RCVD: CPT | Performed by: NURSE PRACTITIONER

## 2021-06-16 PROCEDURE — 99214 OFFICE O/P EST MOD 30 MIN: CPT | Performed by: NURSE PRACTITIONER

## 2021-06-16 PROCEDURE — 3017F COLORECTAL CA SCREEN DOC REV: CPT | Performed by: NURSE PRACTITIONER

## 2021-06-16 PROCEDURE — 2022F DILAT RTA XM EVC RTNOPTHY: CPT | Performed by: NURSE PRACTITIONER

## 2021-06-16 RX ORDER — DIAZEPAM 10 MG/1
TABLET ORAL
Qty: 28 TABLET | Refills: 0 | Status: SHIPPED | OUTPATIENT
Start: 2021-06-16 | End: 2021-07-15

## 2021-06-16 RX ORDER — VARENICLINE TARTRATE 1 MG/1
1 TABLET, FILM COATED ORAL DAILY
Qty: 60 TABLET | Refills: 0 | Status: SHIPPED | OUTPATIENT
Start: 2021-06-16 | End: 2022-03-29 | Stop reason: ALTCHOICE

## 2021-06-16 ASSESSMENT — ENCOUNTER SYMPTOMS
CONSTIPATION: 0
CHEST TIGHTNESS: 0
EYE REDNESS: 0
NAUSEA: 0
BACK PAIN: 0
SINUS PRESSURE: 0
BLOOD IN STOOL: 0
EYE ITCHING: 0
ABDOMINAL PAIN: 0
DIARRHEA: 0
SORE THROAT: 0
RHINORRHEA: 0
WHEEZING: 0
COLOR CHANGE: 0
VOMITING: 0
SHORTNESS OF BREATH: 0
COUGH: 0

## 2021-06-16 ASSESSMENT — PATIENT HEALTH QUESTIONNAIRE - PHQ9
SUM OF ALL RESPONSES TO PHQ9 QUESTIONS 1 & 2: 0
SUM OF ALL RESPONSES TO PHQ QUESTIONS 1-9: 0
1. LITTLE INTEREST OR PLEASURE IN DOING THINGS: 0
2. FEELING DOWN, DEPRESSED OR HOPELESS: 0
SUM OF ALL RESPONSES TO PHQ QUESTIONS 1-9: 0
SUM OF ALL RESPONSES TO PHQ QUESTIONS 1-9: 0

## 2021-06-16 NOTE — PROGRESS NOTES
Rosalba Jones (:  1955) is a 78 y.o. male,Established patient, here for evaluation of the following chief complaint(s):  No chief complaint on file. ASSESSMENT/PLAN:  1. Moderate episode of recurrent major depressive disorder [facility])  Assessment & Plan:   Well-controlled, continue current medications, continue current treatment plan and medication adherence emphasized  2. Anxiety  -     diazePAM (VALIUM) 10 MG tablet; 1 tab daily as needed for anxiety, Disp-28 tablet, R-0Normal  3. Type 2 diabetes mellitus with other specified complication, with long-term current use of insulin (HCC)  Assessment & Plan:   Stable, controlled  No changes  Continue current treatment plan     4. Hypercholesterolemia  Assessment & Plan:   Well-controlled, continue current medications, continue current treatment plan and medication adherence emphasized  5. Essential hypertension  Assessment & Plan:   Well-controlled, continue current medications, continue current treatment plan and medication adherence emphasized  6. Anxiety state  Assessment & Plan:   Well-controlled, continue current medications, continue current treatment plan and medication adherence emphasized  7. Tobacco abuse  Assessment & Plan:  The risks related to smoking were reviewed with the patient. Recommend maintaining a smoke-free lifestyle. Products available for smoking cessation were discussed in detail. Continue chantix 1 mg daily         No follow-ups on file. SUBJECTIVE/OBJECTIVE:  HPI  Patient in office to follow-up on chronic medical conditions. He is doing very well on current treatment plan. He has been taking all his medications without any issues. He states that he was taking to 1 mg tablets of Chantix daily was starting to have GI issues so he did cut back to 1 mg daily. He is taking this still. He continues to cut back on his tobacco use.   He states that his blood sugars have been under well controlled is taking medications without any issues. He continues to take his Lipitor without any side effects. He is up-to-date on his labs. He continues to take Valium for anxiety 1 tablet daily. He will be going on vacation and will run out while he is traveling so requesting early refill so he can take his new prescription with him on vacation. His depression is well controlled on Prozac without any issues or side effects with the medications. Current Outpatient Medications   Medication Sig Dispense Refill    diazePAM (VALIUM) 10 MG tablet 1 tab daily as needed for anxiety 28 tablet 0    varenicline (CHANTIX CONTINUING MONTH SKY) 1 MG tablet Take 1 tablet by mouth daily 60 tablet 0    atorvastatin (LIPITOR) 40 MG tablet Take 1 tablet by mouth once daily 90 tablet 0    metFORMIN (GLUCOPHAGE-XR) 500 MG extended release tablet TAKE 4 TABLETS BY MOUTH ONCE DAILY WITH BREAKFAST 360 tablet 0    lisinopril (PRINIVIL;ZESTRIL) 20 MG tablet Take 1 tablet by mouth once daily 90 tablet 0    LANTUS SOLOSTAR 100 UNIT/ML injection pen INJECT 35 UNITS SUBCUTANEOUSLY NIGHTLY 15 mL 0    FLUoxetine (PROZAC) 20 MG capsule TAKE 1 CAPSULE BY MOUTH ONCE DAILY 90 capsule 1    glipiZIDE (GLIPIZIDE XL) 10 MG extended release tablet Take 1 tablet by mouth daily 90 tablet 3    FLUoxetine (PROZAC) 10 MG capsule TAKE 1 CAPSULE BY MOUTH ONCE DAILY 90 capsule 1    vardenafil (LEVITRA) 20 MG tablet TAKE ONE TABLET BY MOUTH AS NEEDED FOR  ERECTILE  DYSFUNCTION 10 tablet 0    aspirin 81 MG tablet Take 81 mg by mouth daily.  Multiple Vitamin (MULTIVITAMIN PO) Take  by mouth daily.  ibuprofen (ADVIL;MOTRIN) 600 MG tablet TAKE 1 TABLET BY MOUTH EVERY 6 HOURS WITH FOOD AS NEEDED FOR PAIN 120 tablet 0    ketoconazole (NIZORAL) 2 % cream APPLY  CREAM TOPICALLY TWICE DAILY (Patient not taking: Reported on 6/16/2021) 30 g 1    clotrimazole-betamethasone (LOTRISONE) 1-0.05 % cream Apply topically 2 times daily.  (Patient not taking: Reported on 6/16/2021) 15 g 1    glucose blood VI test strips (ASCENSIA AUTODISC VI;ONE TOUCH ULTRA TEST VI) strip 1 each by In Vitro route daily Patient uses One Touch Ultra 2 meter. Patient needs test strips for meter. Testing 2-3 times daily per E11.9 100 each 3    Lancets MISC Patient uses One Touch Ultra 2 meter. Testing 2 -3 times daily per E11.9 100 each 3    NOVOFINE 32G X 6 MM MISC USE WITH INSULIN AT BEDTIME 100 each 3    Loratadine 10 MG CAPS Take 1 tablet by mouth daily      Glucose Blood (BLOOD GLUCOSE TEST STRIPS) STRP Patient is to test twice daily or as directed by physician. 100 strip 3    Blood Glucose Monitoring Suppl JENNIFER Patient to test twice daily or advised by doctor 1 Device 0    Accu-Chek Multiclix Lancets MISC USE ONE LANCET TO CHECK TWICE DAILY 100 each 3    ACCU-CHEK GEN PLUS strip USE ONE STRIP TO CHECK TWICE DAILY 100 each 3    Loperamide HCl (IMODIUM A-D PO) Take 2 tablets by mouth daily       EPINEPHrine (EPIPEN) 0.3 MG/0.3ML JENNIFER injection Use as directed for allergic reaction 2 Device 3     No current facility-administered medications for this visit. Review of Systems   Constitutional: Negative for chills, fatigue and fever. HENT: Negative for congestion, ear pain, postnasal drip, rhinorrhea, sinus pressure, sneezing and sore throat. Eyes: Negative for redness and itching. Respiratory: Negative for cough, chest tightness, shortness of breath and wheezing. Cardiovascular: Negative for chest pain and palpitations. Gastrointestinal: Negative for abdominal pain, blood in stool, constipation, diarrhea, nausea and vomiting. Endocrine: Negative for cold intolerance and heat intolerance. Genitourinary: Negative for difficulty urinating, dysuria, flank pain, frequency, hematuria and urgency. Musculoskeletal: Negative for arthralgias, back pain, joint swelling and myalgias. Skin: Negative for color change, pallor, rash and wound.    Allergic/Immunologic: Negative for environmental allergies and food allergies. Neurological: Negative for dizziness, seizures, syncope, weakness, light-headedness, numbness and headaches. Hematological: Negative for adenopathy. Does not bruise/bleed easily. Psychiatric/Behavioral: Negative for confusion, sleep disturbance and suicidal ideas. The patient is not nervous/anxious and is not hyperactive. Vitals:    06/16/21 1305   BP: 136/64   Pulse: 77   SpO2: 96%   Weight: 212 lb 3.2 oz (96.3 kg)       Physical Exam  Constitutional:       Appearance: Normal appearance. He is well-developed. HENT:      Head: Normocephalic and atraumatic. Right Ear: Hearing normal.      Left Ear: Hearing normal.      Nose: No mucosal edema. Right Sinus: No maxillary sinus tenderness or frontal sinus tenderness. Left Sinus: No maxillary sinus tenderness or frontal sinus tenderness. Mouth/Throat: Tonsils: No tonsillar abscesses. Eyes:      Extraocular Movements: Extraocular movements intact. Pupils: Pupils are equal, round, and reactive to light. Cardiovascular:      Rate and Rhythm: Normal rate and regular rhythm. Pulses: Normal pulses. Heart sounds: Normal heart sounds. Pulmonary:      Effort: Pulmonary effort is normal.      Breath sounds: Normal breath sounds. Lymphadenopathy:      Head:      Right side of head: No submental, submandibular, tonsillar, preauricular, posterior auricular or occipital adenopathy. Left side of head: No submental, submandibular, tonsillar, preauricular, posterior auricular or occipital adenopathy. Skin:     General: Skin is warm and dry. Neurological:      Mental Status: He is alert.    Psychiatric:         Mood and Affect: Mood normal.         Behavior: Behavior normal.           On this date 6/16/2021 I have spent 30 minutes reviewing previous notes, test results and face to face with the patient discussing the diagnosis and importance of compliance with the treatment plan as well as documenting on the day of the visit. An electronic signature was used to authenticate this note.     --Ty Osman, APRN - CNP

## 2021-06-16 NOTE — ASSESSMENT & PLAN NOTE
The risks related to smoking were reviewed with the patient. Recommend maintaining a smoke-free lifestyle. Products available for smoking cessation were discussed in detail.   Continue chantix 1 mg daily

## 2021-07-01 DIAGNOSIS — E11.9 TYPE 2 DIABETES MELLITUS WITHOUT COMPLICATION, UNSPECIFIED WHETHER LONG TERM INSULIN USE (HCC): ICD-10-CM

## 2021-07-01 RX ORDER — INSULIN GLARGINE 100 [IU]/ML
INJECTION, SOLUTION SUBCUTANEOUS
Qty: 15 ML | Refills: 0 | Status: SHIPPED | OUTPATIENT
Start: 2021-07-01 | End: 2021-10-14

## 2021-07-12 RX ORDER — IBUPROFEN 600 MG/1
TABLET ORAL
Qty: 120 TABLET | Refills: 0 | Status: SHIPPED | OUTPATIENT
Start: 2021-07-12 | End: 2021-08-18

## 2021-07-15 DIAGNOSIS — F41.9 ANXIETY: ICD-10-CM

## 2021-07-15 RX ORDER — DIAZEPAM 10 MG/1
TABLET ORAL
Qty: 28 TABLET | Refills: 0 | Status: SHIPPED | OUTPATIENT
Start: 2021-07-15 | End: 2021-08-11 | Stop reason: SDUPTHER

## 2021-07-26 ENCOUNTER — TELEPHONE (OUTPATIENT)
Dept: INTERNAL MEDICINE CLINIC | Age: 66
End: 2021-07-26

## 2021-07-26 RX ORDER — LISINOPRIL 20 MG/1
TABLET ORAL
Qty: 90 TABLET | Refills: 0 | Status: SHIPPED | OUTPATIENT
Start: 2021-07-26 | End: 2021-10-25

## 2021-07-26 RX ORDER — FLUOXETINE HYDROCHLORIDE 20 MG/1
CAPSULE ORAL
Qty: 90 CAPSULE | Refills: 1 | Status: SHIPPED | OUTPATIENT
Start: 2021-07-26 | End: 2021-07-28 | Stop reason: SDUPTHER

## 2021-07-26 NOTE — TELEPHONE ENCOUNTER
Pharmacy called requesting refill. FLUoxetine (PROZAC) 10 MG capsule  [address] [place] [address]   [address], [address]   Phone:  [phone]  Fax:  [phone]    Thank you!

## 2021-07-28 ENCOUNTER — TELEPHONE (OUTPATIENT)
Dept: FAMILY MEDICINE CLINIC | Age: 66
End: 2021-07-28

## 2021-07-28 ENCOUNTER — TELEPHONE (OUTPATIENT)
Dept: INTERNAL MEDICINE CLINIC | Age: 66
End: 2021-07-28

## 2021-07-28 RX ORDER — FLUOXETINE HYDROCHLORIDE 20 MG/1
CAPSULE ORAL
Qty: 90 CAPSULE | Refills: 1 | Status: SHIPPED | OUTPATIENT
Start: 2021-07-28 | End: 2021-07-29 | Stop reason: ALTCHOICE

## 2021-07-28 NOTE — TELEPHONE ENCOUNTER
Pharmacy: [id number] [facility] [phone] - F [phone]    FLUoxetine (PROZAC) 10 MG capsule    Pt is needing a refill on the 10 mg not the 20 mg. Please call. Thank you!

## 2021-07-28 NOTE — TELEPHONE ENCOUNTER
[facility] says they did not receive the FLUoxetine (PROZAC) 20 MG capsule  Can this be sent again?

## 2021-07-29 RX ORDER — FLUOXETINE 10 MG/1
CAPSULE ORAL
Qty: 90 CAPSULE | Refills: 1 | Status: SHIPPED | OUTPATIENT
Start: 2021-07-29 | End: 2021-09-09 | Stop reason: SDUPTHER

## 2021-08-11 ENCOUNTER — TELEPHONE (OUTPATIENT)
Dept: INTERNAL MEDICINE CLINIC | Age: 66
End: 2021-08-11

## 2021-08-11 DIAGNOSIS — F41.9 ANXIETY: ICD-10-CM

## 2021-08-11 RX ORDER — DIAZEPAM 10 MG/1
TABLET ORAL
Qty: 28 TABLET | Refills: 0 | Status: SHIPPED | OUTPATIENT
Start: 2021-08-11 | End: 2021-09-09 | Stop reason: SDUPTHER

## 2021-08-11 NOTE — TELEPHONE ENCOUNTER
Patient requesting a medication refill.   Medication diazePAM (VALIUM)  Dosage 10 MG tablet   [id number] MG tablet   Last filled on 7/15/21  [address], [address]

## 2021-08-18 RX ORDER — IBUPROFEN 600 MG/1
TABLET ORAL
Qty: 120 TABLET | Refills: 0 | Status: SHIPPED | OUTPATIENT
Start: 2021-08-18 | End: 2021-09-20

## 2021-09-01 DIAGNOSIS — E11.9 TYPE 2 DIABETES MELLITUS WITHOUT COMPLICATION, UNSPECIFIED WHETHER LONG TERM INSULIN USE (HCC): ICD-10-CM

## 2021-09-01 RX ORDER — METFORMIN HYDROCHLORIDE 500 MG/1
TABLET, EXTENDED RELEASE ORAL
Qty: 360 TABLET | Refills: 0 | Status: SHIPPED | OUTPATIENT
Start: 2021-09-01 | End: 2021-12-02

## 2021-09-09 ENCOUNTER — TELEPHONE (OUTPATIENT)
Dept: INTERNAL MEDICINE CLINIC | Age: 66
End: 2021-09-09

## 2021-09-09 DIAGNOSIS — F41.9 ANXIETY: ICD-10-CM

## 2021-09-09 RX ORDER — DIAZEPAM 10 MG/1
TABLET ORAL
Qty: 28 TABLET | Refills: 0 | Status: SHIPPED | OUTPATIENT
Start: 2021-09-09 | End: 2021-10-05 | Stop reason: SDUPTHER

## 2021-09-09 RX ORDER — FLUOXETINE HYDROCHLORIDE 20 MG/1
20 CAPSULE ORAL DAILY
Qty: 90 CAPSULE | Refills: 0 | Status: SHIPPED | OUTPATIENT
Start: 2021-09-09 | End: 2021-12-09

## 2021-09-09 RX ORDER — FLUOXETINE 10 MG/1
CAPSULE ORAL
Qty: 90 CAPSULE | Refills: 1 | Status: SHIPPED | OUTPATIENT
Start: 2021-09-09 | End: 2022-01-24

## 2021-09-16 ENCOUNTER — OFFICE VISIT (OUTPATIENT)
Dept: INTERNAL MEDICINE CLINIC | Age: 66
End: 2021-09-16
Payer: MEDICARE

## 2021-09-16 VITALS
BODY MASS INDEX: 28.07 KG/M2 | DIASTOLIC BLOOD PRESSURE: 74 MMHG | OXYGEN SATURATION: 98 % | SYSTOLIC BLOOD PRESSURE: 124 MMHG | HEART RATE: 78 BPM | WEIGHT: 207 LBS

## 2021-09-16 DIAGNOSIS — I10 ESSENTIAL HYPERTENSION: ICD-10-CM

## 2021-09-16 DIAGNOSIS — F33.1 MODERATE EPISODE OF RECURRENT MAJOR DEPRESSIVE DISORDER (HCC): ICD-10-CM

## 2021-09-16 DIAGNOSIS — E11.69 TYPE 2 DIABETES MELLITUS WITH OTHER SPECIFIED COMPLICATION, WITH LONG-TERM CURRENT USE OF INSULIN (HCC): Primary | ICD-10-CM

## 2021-09-16 DIAGNOSIS — Z79.4 TYPE 2 DIABETES MELLITUS WITH OTHER SPECIFIED COMPLICATION, WITH LONG-TERM CURRENT USE OF INSULIN (HCC): Primary | ICD-10-CM

## 2021-09-16 DIAGNOSIS — F41.1 ANXIETY STATE: ICD-10-CM

## 2021-09-16 DIAGNOSIS — N52.2 DRUG-INDUCED ERECTILE DYSFUNCTION: ICD-10-CM

## 2021-09-16 DIAGNOSIS — Z72.0 TOBACCO ABUSE: ICD-10-CM

## 2021-09-16 DIAGNOSIS — E78.00 HYPERCHOLESTEROLEMIA: ICD-10-CM

## 2021-09-16 PROCEDURE — G0008 ADMIN INFLUENZA VIRUS VAC: HCPCS | Performed by: NURSE PRACTITIONER

## 2021-09-16 PROCEDURE — 3017F COLORECTAL CA SCREEN DOC REV: CPT | Performed by: NURSE PRACTITIONER

## 2021-09-16 PROCEDURE — 2022F DILAT RTA XM EVC RTNOPTHY: CPT | Performed by: NURSE PRACTITIONER

## 2021-09-16 PROCEDURE — 90694 VACC AIIV4 NO PRSRV 0.5ML IM: CPT | Performed by: NURSE PRACTITIONER

## 2021-09-16 PROCEDURE — G8417 CALC BMI ABV UP PARAM F/U: HCPCS | Performed by: NURSE PRACTITIONER

## 2021-09-16 PROCEDURE — G8427 DOCREV CUR MEDS BY ELIG CLIN: HCPCS | Performed by: NURSE PRACTITIONER

## 2021-09-16 PROCEDURE — 3044F HG A1C LEVEL LT 7.0%: CPT | Performed by: NURSE PRACTITIONER

## 2021-09-16 PROCEDURE — 4040F PNEUMOC VAC/ADMIN/RCVD: CPT | Performed by: NURSE PRACTITIONER

## 2021-09-16 PROCEDURE — 4004F PT TOBACCO SCREEN RCVD TLK: CPT | Performed by: NURSE PRACTITIONER

## 2021-09-16 PROCEDURE — 1123F ACP DISCUSS/DSCN MKR DOCD: CPT | Performed by: NURSE PRACTITIONER

## 2021-09-16 PROCEDURE — 99214 OFFICE O/P EST MOD 30 MIN: CPT | Performed by: NURSE PRACTITIONER

## 2021-09-16 RX ORDER — SILDENAFIL 100 MG/1
100 TABLET, FILM COATED ORAL PRN
Qty: 10 TABLET | Refills: 3 | Status: SHIPPED | OUTPATIENT
Start: 2021-09-16 | End: 2022-03-29 | Stop reason: ALTCHOICE

## 2021-09-16 ASSESSMENT — ENCOUNTER SYMPTOMS
SORE THROAT: 0
COUGH: 0
DIARRHEA: 0
RHINORRHEA: 0
SHORTNESS OF BREATH: 0
BLOOD IN STOOL: 0
WHEEZING: 0
COLOR CHANGE: 0
VOMITING: 0
CONSTIPATION: 0
NAUSEA: 0
BACK PAIN: 0
ABDOMINAL PAIN: 0
CHEST TIGHTNESS: 0
EYE REDNESS: 0
SINUS PRESSURE: 0
EYE ITCHING: 0

## 2021-09-16 ASSESSMENT — PATIENT HEALTH QUESTIONNAIRE - PHQ9
DEPRESSION UNABLE TO ASSESS: FUNCTIONAL CAPACITY MOTIVATION LIMITS ACCURACY
1. LITTLE INTEREST OR PLEASURE IN DOING THINGS: 0
SUM OF ALL RESPONSES TO PHQ QUESTIONS 1-9: 0
SUM OF ALL RESPONSES TO PHQ9 QUESTIONS 1 & 2: 0
2. FEELING DOWN, DEPRESSED OR HOPELESS: 0
SUM OF ALL RESPONSES TO PHQ QUESTIONS 1-9: 0
SUM OF ALL RESPONSES TO PHQ QUESTIONS 1-9: 0

## 2021-09-16 NOTE — ASSESSMENT & PLAN NOTE
Was doing well with cutting back on cigarette use with Chantix, until recall. Has tried patches, gum, Wellbutrin in the past without benefit. Continue to encourage smoking cessation.

## 2021-09-16 NOTE — PROGRESS NOTES
Madison Rawls (:  1955) is a 78 y.o. male,Established patient, here for evaluation of the following chief complaint(s):  3 Month Follow-Up      ASSESSMENT/PLAN:  1. Type 2 diabetes mellitus with other specified complication, with long-term current use of insulin (HCC)  Assessment & Plan:  Stable, controlled. Continue current treatment plan. 2. Hypercholesterolemia  Assessment & Plan:  Stable, controlled. Continue current treatment plan. 3. Essential hypertension  Assessment & Plan:  Stable, controlled. Continue current treatment plan. 4. Anxiety state  Assessment & Plan:  Stable, well controlled. Continue current treatment plan. 5. Tobacco abuse  Assessment & Plan:  Was doing well with cutting back on cigarette use with Chantix, until recall. Has tried patches, gum, Wellbutrin in the past without benefit. Continue to encourage smoking cessation. 6. Moderate episode of recurrent major depressive disorder ([facility] [address]  Assessment & Plan:  Stable, well controlled. No changes. Continue current treatment plan. 7. Drug-induced erectile dysfunction  Assessment & Plan:   Trial of viagra  eval how he tolerates. No follow-ups on file. SUBJECTIVE/OBJECTIVE:  Ashtyn Nguyen is in the office today for a 3 month follow-up on his chronic health conditions. He states that overall he's been doing well. He was doing very well with decreasing his cigarette smoking with Chantix, however with the recall he's been unable to take and smoking has increased again. He's tried patches, gum, and wellbutrin in the past without noted benefit. He would like to wait to see if Chantix comes back on the market until he were to try the other options again. At appointment end, he wanted to discuss ED, he states that he is able to achieve an erection, but it is \"soft\" and does not last. He would like to try medication.      Current Outpatient Medications   Medication Sig Dispense Refill    sildenafil (VIAGRA) 100 MG tablet Take 1 tablet by mouth as needed for Erectile Dysfunction 10 tablet 3    FLUoxetine (PROZAC) 10 MG capsule TAKE 1 CAPSULE BY MOUTH ONCE DAILY 90 capsule 1    diazePAM (VALIUM) 10 MG tablet TAKE 1 TABLET BY MOUTH ONCE DAILY AS NEEDED FOR ANXIETY 28 tablet 0    FLUoxetine (PROZAC) 20 MG capsule Take 1 capsule by mouth daily 90 capsule 0    metFORMIN (GLUCOPHAGE-XR) 500 MG extended release tablet TAKE 4 TABLETS BY MOUTH ONCE DAILY WITH BREAKFAST 360 tablet 0    ibuprofen (ADVIL;MOTRIN) 600 MG tablet TAKE 1 TABLET BY MOUTH EVERY 6 HOURS WITH FOOD AS NEEDED FOR PAIN 120 tablet 0    lisinopril (PRINIVIL;ZESTRIL) 20 MG tablet Take 1 tablet by mouth once daily 90 tablet 0    LANTUS SOLOSTAR 100 UNIT/ML injection pen INJECT 35 UNITS SUBCUTANEOUSLY NIGHTLY 15 mL 0    atorvastatin (LIPITOR) 40 MG tablet Take 1 tablet by mouth once daily 90 tablet 0    glipiZIDE (GLIPIZIDE XL) 10 MG extended release tablet Take 1 tablet by mouth daily 90 tablet 3    ketoconazole (NIZORAL) 2 % cream APPLY  CREAM TOPICALLY TWICE DAILY 30 g 1    clotrimazole-betamethasone (LOTRISONE) 1-0.05 % cream Apply topically 2 times daily. 15 g 1    vardenafil (LEVITRA) 20 MG tablet TAKE ONE TABLET BY MOUTH AS NEEDED FOR  ERECTILE  DYSFUNCTION 10 tablet 0    glucose blood VI test strips (ASCENSIA AUTODISC VI;ONE TOUCH ULTRA TEST VI) strip 1 each by In Vitro route daily Patient uses One Touch Ultra 2 meter. Patient needs test strips for meter. Testing 2-3 times daily per E11.9 100 each 3    Lancets MISC Patient uses One Touch Ultra 2 meter. Testing 2 -3 times daily per E11.9 100 each 3    NOVOFINE 32G X 6 MM MISC USE WITH INSULIN AT BEDTIME 100 each 3    Loratadine 10 MG CAPS Take 1 tablet by mouth daily      Glucose Blood (BLOOD GLUCOSE TEST STRIPS) STRP Patient is to test twice daily or as directed by physician.  100 strip 3    Blood Glucose Monitoring Suppl JENNIFER Patient to test twice daily or advised by doctor 1 Device 0    Accu-Chek Multiclix Lancets MISC USE ONE LANCET TO CHECK TWICE DAILY 100 each 3    ACCU-CHEK GEN PLUS strip USE ONE STRIP TO CHECK TWICE DAILY 100 each 3    Loperamide HCl (IMODIUM A-D PO) Take 2 tablets by mouth daily       aspirin 81 MG tablet Take 81 mg by mouth daily.  EPINEPHrine (EPIPEN) 0.3 MG/0.3ML JENNIFER injection Use as directed for allergic reaction 2 Device 3    Multiple Vitamin (MULTIVITAMIN PO) Take  by mouth daily.  varenicline (CHANTIX CONTINUING MONTH SKY) 1 MG tablet Take 1 tablet by mouth daily 60 tablet 0     No current facility-administered medications for this visit. Review of Systems   Constitutional: Negative for chills, fatigue and fever. HENT: Negative for congestion, ear pain, postnasal drip, rhinorrhea, sinus pressure, sneezing and sore throat. Eyes: Negative for redness and itching. Respiratory: Negative for cough, chest tightness, shortness of breath and wheezing. Cardiovascular: Negative for chest pain and palpitations. Gastrointestinal: Negative for abdominal pain, blood in stool, constipation, diarrhea, nausea and vomiting. Endocrine: Negative for cold intolerance and heat intolerance. Genitourinary: Negative for difficulty urinating, dysuria, flank pain, frequency, hematuria and urgency. Musculoskeletal: Negative for arthralgias, back pain, joint swelling and myalgias. Skin: Negative for color change, pallor, rash and wound. Allergic/Immunologic: Negative for environmental allergies and food allergies. Neurological: Negative for dizziness, seizures, syncope, weakness, light-headedness, numbness and headaches. Hematological: Negative for adenopathy. Does not bruise/bleed easily. Psychiatric/Behavioral: Negative for confusion, sleep disturbance and suicidal ideas. The patient is not nervous/anxious and is not hyperactive.         Vitals:    09/16/21 1310   BP: 124/74   Site: Left Upper Arm   Position: Sitting   Cuff Size: Large Adult Pulse: 78   SpO2: 98%   Weight: 207 lb (93.9 kg)       Physical Exam  Constitutional:       Appearance: Normal appearance. He is well-developed. HENT:      Head: Normocephalic and atraumatic. Right Ear: Hearing normal.      Left Ear: Hearing normal.      Nose: No mucosal edema. Right Sinus: No maxillary sinus tenderness or frontal sinus tenderness. Left Sinus: No maxillary sinus tenderness or frontal sinus tenderness. Mouth/Throat: Tonsils: No tonsillar abscesses. Eyes:      Extraocular Movements: Extraocular movements intact. Pupils: Pupils are equal, round, and reactive to light. Cardiovascular:      Rate and Rhythm: Normal rate and regular rhythm. Pulses: Normal pulses. Heart sounds: Normal heart sounds. Pulmonary:      Effort: Pulmonary effort is normal.      Breath sounds: Normal breath sounds. Lymphadenopathy:      Head:      Right side of head: No submental, submandibular, tonsillar, preauricular, posterior auricular or occipital adenopathy. Left side of head: No submental, submandibular, tonsillar, preauricular, posterior auricular or occipital adenopathy. Skin:     General: Skin is warm and dry. Neurological:      Mental Status: He is alert. Psychiatric:         Mood and Affect: Mood normal.         Behavior: Behavior normal.               An electronic signature was used to authenticate this note.     --LUCY Frey - CNP

## 2021-09-20 RX ORDER — IBUPROFEN 600 MG/1
TABLET ORAL
Qty: 120 TABLET | Refills: 0 | Status: SHIPPED | OUTPATIENT
Start: 2021-09-20 | End: 2021-10-28

## 2021-09-20 RX ORDER — ATORVASTATIN CALCIUM 40 MG/1
TABLET, FILM COATED ORAL
Qty: 90 TABLET | Refills: 0 | Status: SHIPPED | OUTPATIENT
Start: 2021-09-20 | End: 2021-12-17

## 2021-10-04 RX ORDER — GLIPIZIDE 10 MG/1
TABLET, FILM COATED, EXTENDED RELEASE ORAL
Qty: 90 TABLET | Refills: 0 | Status: SHIPPED | OUTPATIENT
Start: 2021-10-04 | End: 2022-01-12

## 2021-10-05 ENCOUNTER — TELEPHONE (OUTPATIENT)
Dept: INTERNAL MEDICINE CLINIC | Age: 66
End: 2021-10-05

## 2021-10-05 DIAGNOSIS — F41.9 ANXIETY: ICD-10-CM

## 2021-10-05 RX ORDER — DIAZEPAM 10 MG/1
TABLET ORAL
Qty: 28 TABLET | Refills: 0 | Status: SHIPPED | OUTPATIENT
Start: 2021-10-05 | End: 2021-11-01 | Stop reason: SDUPTHER

## 2021-10-05 NOTE — TELEPHONE ENCOUNTER
Patient requesting a medication refill.   Medication diazePAM (VALIUM)  Dosage 10 MG tablet   Frequency: TAKE 1 TABLET BY MOUTH ONCE DAILY AS NEEDED FOR ANXIETY  Last filled on 9/9/21  [address], [address] [place] [address] -  [phone]

## 2021-10-14 DIAGNOSIS — E11.9 TYPE 2 DIABETES MELLITUS WITHOUT COMPLICATION, UNSPECIFIED WHETHER LONG TERM INSULIN USE (HCC): ICD-10-CM

## 2021-10-14 RX ORDER — INSULIN GLARGINE 100 [IU]/ML
INJECTION, SOLUTION SUBCUTANEOUS
Qty: 15 ML | Refills: 0 | Status: SHIPPED | OUTPATIENT
Start: 2021-10-14 | End: 2021-12-23

## 2021-10-25 RX ORDER — LISINOPRIL 20 MG/1
TABLET ORAL
Qty: 90 TABLET | Refills: 0 | Status: SHIPPED | OUTPATIENT
Start: 2021-10-25 | End: 2022-01-25

## 2021-10-28 RX ORDER — IBUPROFEN 600 MG/1
TABLET ORAL
Qty: 120 TABLET | Refills: 0 | Status: SHIPPED | OUTPATIENT
Start: 2021-10-28 | End: 2021-12-02

## 2021-11-01 ENCOUNTER — TELEPHONE (OUTPATIENT)
Dept: INTERNAL MEDICINE CLINIC | Age: 66
End: 2021-11-01

## 2021-11-01 DIAGNOSIS — F41.9 ANXIETY: ICD-10-CM

## 2021-11-01 RX ORDER — DIAZEPAM 10 MG/1
TABLET ORAL
Qty: 28 TABLET | Refills: 0 | Status: SHIPPED | OUTPATIENT
Start: 2021-11-01 | End: 2021-11-30

## 2021-11-01 NOTE — TELEPHONE ENCOUNTER
Patient requesting a medication refill.   Medication diazePAM (VALIUM)   Dosage 10 MG tablet   FrequencyTAKE 1 TABLET BY MOUTH ONCE DAILY AS NEEDED FOR ANXIETY  Last filled on 10/5/21  [address], [address] [place] [address] [address] [phone]

## 2021-11-16 ENCOUNTER — OFFICE VISIT (OUTPATIENT)
Dept: INTERNAL MEDICINE CLINIC | Age: 66
End: 2021-11-16
Payer: MEDICARE

## 2021-11-16 VITALS
WEIGHT: 206 LBS | HEART RATE: 94 BPM | BODY MASS INDEX: 27.9 KG/M2 | DIASTOLIC BLOOD PRESSURE: 78 MMHG | SYSTOLIC BLOOD PRESSURE: 126 MMHG | OXYGEN SATURATION: 95 % | HEIGHT: 72 IN

## 2021-11-16 DIAGNOSIS — R19.7 DIARRHEA OF PRESUMED INFECTIOUS ORIGIN: Primary | ICD-10-CM

## 2021-11-16 DIAGNOSIS — R19.7 DIARRHEA, UNSPECIFIED TYPE: ICD-10-CM

## 2021-11-16 DIAGNOSIS — R19.7 DIARRHEA OF PRESUMED INFECTIOUS ORIGIN: ICD-10-CM

## 2021-11-16 LAB
HCT VFR BLD CALC: 49.5 % (ref 40.5–52.5)
HEMOGLOBIN: 17 G/DL (ref 13.5–17.5)
MCH RBC QN AUTO: 31.9 PG (ref 26–34)
MCHC RBC AUTO-ENTMCNC: 34.4 G/DL (ref 31–36)
MCV RBC AUTO: 92.9 FL (ref 80–100)
PDW BLD-RTO: 13.7 % (ref 12.4–15.4)
PLATELET # BLD: 285 K/UL (ref 135–450)
PMV BLD AUTO: 8 FL (ref 5–10.5)
RBC # BLD: 5.34 M/UL (ref 4.2–5.9)
WBC # BLD: 10 K/UL (ref 4–11)

## 2021-11-16 PROCEDURE — 99214 OFFICE O/P EST MOD 30 MIN: CPT | Performed by: NURSE PRACTITIONER

## 2021-11-16 PROCEDURE — 3017F COLORECTAL CA SCREEN DOC REV: CPT | Performed by: NURSE PRACTITIONER

## 2021-11-16 PROCEDURE — 4040F PNEUMOC VAC/ADMIN/RCVD: CPT | Performed by: NURSE PRACTITIONER

## 2021-11-16 PROCEDURE — G8417 CALC BMI ABV UP PARAM F/U: HCPCS | Performed by: NURSE PRACTITIONER

## 2021-11-16 PROCEDURE — G8484 FLU IMMUNIZE NO ADMIN: HCPCS | Performed by: NURSE PRACTITIONER

## 2021-11-16 PROCEDURE — 4004F PT TOBACCO SCREEN RCVD TLK: CPT | Performed by: NURSE PRACTITIONER

## 2021-11-16 PROCEDURE — G8427 DOCREV CUR MEDS BY ELIG CLIN: HCPCS | Performed by: NURSE PRACTITIONER

## 2021-11-16 PROCEDURE — 1123F ACP DISCUSS/DSCN MKR DOCD: CPT | Performed by: NURSE PRACTITIONER

## 2021-11-16 RX ORDER — SACCHAROMYCES BOULARDII 250 MG
250 CAPSULE ORAL 2 TIMES DAILY
Qty: 60 CAPSULE | Refills: 0 | Status: SHIPPED | OUTPATIENT
Start: 2021-11-16 | End: 2021-12-16

## 2021-11-16 ASSESSMENT — ENCOUNTER SYMPTOMS
VOMITING: 1
BLOOD IN STOOL: 0
ABDOMINAL PAIN: 0
DIARRHEA: 1
ABDOMINAL DISTENTION: 0
CONSTIPATION: 0
NAUSEA: 1
ALLERGIC/IMMUNOLOGIC NEGATIVE: 1
RESPIRATORY NEGATIVE: 1

## 2021-11-16 ASSESSMENT — PATIENT HEALTH QUESTIONNAIRE - PHQ9
SUM OF ALL RESPONSES TO PHQ QUESTIONS 1-9: 0
2. FEELING DOWN, DEPRESSED OR HOPELESS: 0
SUM OF ALL RESPONSES TO PHQ9 QUESTIONS 1 & 2: 0
DEPRESSION UNABLE TO ASSESS: FUNCTIONAL CAPACITY MOTIVATION LIMITS ACCURACY
1. LITTLE INTEREST OR PLEASURE IN DOING THINGS: 0

## 2021-11-16 NOTE — ASSESSMENT & PLAN NOTE
Unclear etiology  Check stool and labs  Encouraged hydration and high fiber diet  Start probiotic. Can continue imodium, advised on reducing use as symptoms improve. Call if symptoms do not improve after 4 weeks.

## 2021-11-16 NOTE — PROGRESS NOTES
Clement Hammond (:  1955) is a 78 y.o. male,Established patient, here for evaluation of the following chief complaint(s): Other (Bowel issues)      ASSESSMENT/PLAN:  1. Diarrhea of presumed infectious origin  Assessment & Plan:  Unclear etiology  Check stool and labs  Encouraged hydration and high fiber diet  Start probiotic. Can continue imodium, advised on reducing use as symptoms improve. Call if symptoms do not improve after 4 weeks. Orders:  -     CBC; Future  -     COMPREHENSIVE METABOLIC PANEL; Future  -     C DIFF TOXIN/ANTIGEN; Future  -     GI Bacterial Pathogens By PCR; Future  -     Fecal Leukocytes; Future  -     GIARDIA ANTIGEN; Future  -     ROTAVIRUS ANTIGEN, STOOL; Future  2. Diarrhea, unspecified type  Assessment & Plan:  Unclear etiology  Check stool and labs  Encouraged hydration and high fiber diet  Start probiotic. Can continue imodium, advised on reducing use as symptoms improve. Call if symptoms do not improve after 4 weeks. No follow-ups on file. SUBJECTIVE/OBJECTIVE:  Pt reports diarrhea over 4 weeks with episodes being more frequent the last week. . Pt reports no pattern in diarrhea, can have multiple episodes during one day and none the next. Does report a few episodes incontinence. Up to 4 loose stools per day. Pt has had normal formed stool in between. Denies abdominal pain, cramping, fevers, reports occasional chills. He had a CT scan for lung cancer screening earlier this year and was told he had gall stones. He states he has occasional nausea and one episode of vomiting. He has been taking imodium daily to help with the diarrhea, reports some relief.        Current Outpatient Medications   Medication Sig Dispense Refill    saccharomyces boulardii (FLORASTOR) 250 MG capsule Take 1 capsule by mouth 2 times daily 60 capsule 0    diazePAM (VALIUM) 10 MG tablet TAKE 1 TABLET BY MOUTH ONCE DAILY AS NEEDED FOR ANXIETY 28 tablet 0    ibuprofen (ADVIL;MOTRIN) 600 MG tablet TAKE 1 TABLET BY MOUTH EVERY 6 HOURS WITH FOOD AS NEEDED FOR PAIN 120 tablet 0    lisinopril (PRINIVIL;ZESTRIL) 20 MG tablet Take 1 tablet by mouth once daily 90 tablet 0    LANTUS SOLOSTAR 100 UNIT/ML injection pen INJECT 35 UNITS SUBCUTANEOUSLY NIGHTLY 15 mL 0    glipiZIDE (GLUCOTROL XL) 10 MG extended release tablet Take 1 tablet by mouth once daily 90 tablet 0    atorvastatin (LIPITOR) 40 MG tablet Take 1 tablet by mouth once daily 90 tablet 0    FLUoxetine (PROZAC) 10 MG capsule TAKE 1 CAPSULE BY MOUTH ONCE DAILY 90 capsule 1    FLUoxetine (PROZAC) 20 MG capsule Take 1 capsule by mouth daily 90 capsule 0    metFORMIN (GLUCOPHAGE-XR) 500 MG extended release tablet TAKE 4 TABLETS BY MOUTH ONCE DAILY WITH BREAKFAST 360 tablet 0    ketoconazole (NIZORAL) 2 % cream APPLY  CREAM TOPICALLY TWICE DAILY 30 g 1    clotrimazole-betamethasone (LOTRISONE) 1-0.05 % cream Apply topically 2 times daily. 15 g 1    vardenafil (LEVITRA) 20 MG tablet TAKE ONE TABLET BY MOUTH AS NEEDED FOR  ERECTILE  DYSFUNCTION 10 tablet 0    glucose blood VI test strips (ASCENSIA AUTODISC VI;ONE TOUCH ULTRA TEST VI) strip 1 each by In Vitro route daily Patient uses One Touch Ultra 2 meter. Patient needs test strips for meter. Testing 2-3 times daily per E11.9 100 each 3    Lancets MISC Patient uses One Touch Ultra 2 meter. Testing 2 -3 times daily per E11.9 100 each 3    NOVOFINE 32G X 6 MM MISC USE WITH INSULIN AT BEDTIME 100 each 3    Loratadine 10 MG CAPS Take 1 tablet by mouth daily      Glucose Blood (BLOOD GLUCOSE TEST STRIPS) STRP Patient is to test twice daily or as directed by physician.  100 strip 3    Blood Glucose Monitoring Suppl JENNIFER Patient to test twice daily or advised by doctor 1 Device 0    Accu-Chek Multiclix Lancets MISC USE ONE LANCET TO CHECK TWICE DAILY 100 each 3    ACCU-CHEK GEN PLUS strip USE ONE STRIP TO CHECK TWICE DAILY 100 each 3    Loperamide HCl (IMODIUM A-D PO) Take 2 tablets by mouth daily       aspirin 81 MG tablet Take 81 mg by mouth daily.  EPINEPHrine (EPIPEN) 0.3 MG/0.3ML JENNIFER injection Use as directed for allergic reaction 2 Device 3    Multiple Vitamin (MULTIVITAMIN PO) Take  by mouth daily.  sildenafil (VIAGRA) 100 MG tablet Take 1 tablet by mouth as needed for Erectile Dysfunction 10 tablet 3    varenicline (CHANTIX CONTINUING MONTH SKY) 1 MG tablet Take 1 tablet by mouth daily 60 tablet 0     No current facility-administered medications for this visit. Review of Systems   Constitutional: Negative. Respiratory: Negative. Cardiovascular: Negative. Gastrointestinal: Positive for diarrhea, nausea and vomiting. Negative for abdominal distention, abdominal pain, blood in stool and constipation. Allergic/Immunologic: Negative. Vitals:    11/16/21 0956   BP: 126/78   Site: Left Upper Arm   Position: Sitting   Cuff Size: Large Adult   Pulse: 94   SpO2: 95%   Weight: 206 lb (93.4 kg)   Height: 6' (1.829 m)       Physical Exam  Constitutional:       Appearance: Normal appearance. Cardiovascular:      Rate and Rhythm: Normal rate and regular rhythm. Pulses: Normal pulses. Heart sounds: Normal heart sounds. Pulmonary:      Effort: Pulmonary effort is normal.      Breath sounds: Normal breath sounds. Abdominal:      General: Abdomen is flat. Bowel sounds are normal. There is no distension. Palpations: Abdomen is soft. Tenderness: There is no abdominal tenderness. There is no rebound. Negative signs include Gillis's sign and Rovsing's sign. Skin:     General: Skin is warm and dry. Neurological:      General: No focal deficit present. Mental Status: He is alert and oriented to person, place, and time. Psychiatric:         Mood and Affect: Mood normal.         Behavior: Behavior normal.         Thought Content:  Thought content normal.         Judgment: Judgment normal.             An electronic signature was used to authenticate this note.     --Ron Herrera, APRN - CNP

## 2021-11-17 LAB
A/G RATIO: 1.6 (ref 1.1–2.2)
ALBUMIN SERPL-MCNC: 4.7 G/DL (ref 3.4–5)
ALP BLD-CCNC: 91 U/L (ref 40–129)
ALT SERPL-CCNC: 30 U/L (ref 10–40)
ANION GAP SERPL CALCULATED.3IONS-SCNC: 20 MMOL/L (ref 3–16)
AST SERPL-CCNC: 27 U/L (ref 15–37)
BILIRUB SERPL-MCNC: 0.5 MG/DL (ref 0–1)
BUN BLDV-MCNC: 14 MG/DL (ref 7–20)
CALCIUM SERPL-MCNC: 10.2 MG/DL (ref 8.3–10.6)
CHLORIDE BLD-SCNC: 98 MMOL/L (ref 99–110)
CO2: 21 MMOL/L (ref 21–32)
CREAT SERPL-MCNC: 0.8 MG/DL (ref 0.8–1.3)
GFR AFRICAN AMERICAN: >60
GFR NON-AFRICAN AMERICAN: >60
GLUCOSE BLD-MCNC: 140 MG/DL (ref 70–99)
POTASSIUM SERPL-SCNC: 4.4 MMOL/L (ref 3.5–5.1)
SODIUM BLD-SCNC: 139 MMOL/L (ref 136–145)
TOTAL PROTEIN: 7.6 G/DL (ref 6.4–8.2)

## 2021-11-29 ENCOUNTER — TELEPHONE (OUTPATIENT)
Dept: INTERNAL MEDICINE CLINIC | Age: 66
End: 2021-11-29

## 2021-11-29 DIAGNOSIS — F41.9 ANXIETY: ICD-10-CM

## 2021-11-29 NOTE — TELEPHONE ENCOUNTER
Pt wants Mulu Khan to know he hasn't given a stool sample because once she suggested a probiotic he hasn't had any bowel problems since then  Does Mulu Khan still want him to give a sample?

## 2021-11-29 NOTE — TELEPHONE ENCOUNTER
Patient requesting a medication refill.   Medication diazePAM (VALIUM)   Dosage  10 MG tablet   FrequencyTAKE 1 TABLET BY MOUTH ONCE DAILY AS NEEDED FOR ANXIETY  Last filled on 11/1/21  [address], [address] [place] [address] [address] [phone]

## 2021-11-30 RX ORDER — DIAZEPAM 10 MG/1
TABLET ORAL
Qty: 28 TABLET | Refills: 0 | Status: SHIPPED | OUTPATIENT
Start: 2021-11-30 | End: 2021-12-22 | Stop reason: SDUPTHER

## 2021-12-02 DIAGNOSIS — E11.9 TYPE 2 DIABETES MELLITUS WITHOUT COMPLICATION, UNSPECIFIED WHETHER LONG TERM INSULIN USE (HCC): ICD-10-CM

## 2021-12-02 RX ORDER — IBUPROFEN 600 MG/1
TABLET ORAL
Qty: 120 TABLET | Refills: 0 | Status: SHIPPED | OUTPATIENT
Start: 2021-12-02 | End: 2022-01-04

## 2021-12-02 RX ORDER — METFORMIN HYDROCHLORIDE 500 MG/1
TABLET, EXTENDED RELEASE ORAL
Qty: 360 TABLET | Refills: 0 | Status: SHIPPED | OUTPATIENT
Start: 2021-12-02 | End: 2022-03-04

## 2021-12-17 RX ORDER — ATORVASTATIN CALCIUM 40 MG/1
TABLET, FILM COATED ORAL
Qty: 90 TABLET | Refills: 0 | Status: SHIPPED | OUTPATIENT
Start: 2021-12-17 | End: 2022-03-21

## 2021-12-22 ENCOUNTER — TELEPHONE (OUTPATIENT)
Dept: INTERNAL MEDICINE CLINIC | Age: 66
End: 2021-12-22

## 2021-12-22 DIAGNOSIS — F41.9 ANXIETY: ICD-10-CM

## 2021-12-22 RX ORDER — DIAZEPAM 10 MG/1
TABLET ORAL
Qty: 28 TABLET | Refills: 0 | Status: SHIPPED | OUTPATIENT
Start: 2021-12-22 | End: 2022-01-24 | Stop reason: SDUPTHER

## 2021-12-22 NOTE — TELEPHONE ENCOUNTER
Pt is asking for a refill request for diazePAM (VALIUM) 10 MG tablet   It was last filled 11/30  It will need to be filled over the holiday break  Please contact

## 2021-12-23 DIAGNOSIS — E11.9 TYPE 2 DIABETES MELLITUS WITHOUT COMPLICATION, UNSPECIFIED WHETHER LONG TERM INSULIN USE (HCC): ICD-10-CM

## 2021-12-23 RX ORDER — INSULIN GLARGINE 100 [IU]/ML
INJECTION, SOLUTION SUBCUTANEOUS
Qty: 15 ML | Refills: 0 | Status: SHIPPED | OUTPATIENT
Start: 2021-12-23 | End: 2022-03-21

## 2021-12-29 ENCOUNTER — OFFICE VISIT (OUTPATIENT)
Dept: INTERNAL MEDICINE CLINIC | Age: 66
End: 2021-12-29
Payer: MEDICARE

## 2021-12-29 VITALS
HEIGHT: 72 IN | SYSTOLIC BLOOD PRESSURE: 142 MMHG | TEMPERATURE: 97.5 F | WEIGHT: 207 LBS | HEART RATE: 92 BPM | DIASTOLIC BLOOD PRESSURE: 80 MMHG | OXYGEN SATURATION: 95 % | BODY MASS INDEX: 28.04 KG/M2

## 2021-12-29 DIAGNOSIS — R19.7 DIARRHEA, UNSPECIFIED TYPE: ICD-10-CM

## 2021-12-29 DIAGNOSIS — E55.9 VITAMIN D DEFICIENCY: ICD-10-CM

## 2021-12-29 DIAGNOSIS — N52.2 DRUG-INDUCED ERECTILE DYSFUNCTION: ICD-10-CM

## 2021-12-29 DIAGNOSIS — Z79.4 TYPE 2 DIABETES MELLITUS WITH OTHER SPECIFIED COMPLICATION, WITH LONG-TERM CURRENT USE OF INSULIN (HCC): ICD-10-CM

## 2021-12-29 DIAGNOSIS — F41.1 ANXIETY STATE: ICD-10-CM

## 2021-12-29 DIAGNOSIS — E78.00 HYPERCHOLESTEROLEMIA: ICD-10-CM

## 2021-12-29 DIAGNOSIS — N40.0 BENIGN PROSTATIC HYPERPLASIA WITHOUT LOWER URINARY TRACT SYMPTOMS: ICD-10-CM

## 2021-12-29 DIAGNOSIS — Z72.0 TOBACCO ABUSE: ICD-10-CM

## 2021-12-29 DIAGNOSIS — I10 ESSENTIAL HYPERTENSION: ICD-10-CM

## 2021-12-29 DIAGNOSIS — Z00.00 ENCOUNTER FOR ANNUAL WELLNESS VISIT (AWV) IN MEDICARE PATIENT: ICD-10-CM

## 2021-12-29 DIAGNOSIS — Z00.00 ROUTINE GENERAL MEDICAL EXAMINATION AT A HEALTH CARE FACILITY: Primary | ICD-10-CM

## 2021-12-29 DIAGNOSIS — E11.69 TYPE 2 DIABETES MELLITUS WITH OTHER SPECIFIED COMPLICATION, WITH LONG-TERM CURRENT USE OF INSULIN (HCC): ICD-10-CM

## 2021-12-29 DIAGNOSIS — F33.1 MODERATE EPISODE OF RECURRENT MAJOR DEPRESSIVE DISORDER (HCC): ICD-10-CM

## 2021-12-29 PROCEDURE — G0439 PPPS, SUBSEQ VISIT: HCPCS | Performed by: NURSE PRACTITIONER

## 2021-12-29 PROCEDURE — 3017F COLORECTAL CA SCREEN DOC REV: CPT | Performed by: NURSE PRACTITIONER

## 2021-12-29 PROCEDURE — 4040F PNEUMOC VAC/ADMIN/RCVD: CPT | Performed by: NURSE PRACTITIONER

## 2021-12-29 PROCEDURE — 1123F ACP DISCUSS/DSCN MKR DOCD: CPT | Performed by: NURSE PRACTITIONER

## 2021-12-29 PROCEDURE — G8484 FLU IMMUNIZE NO ADMIN: HCPCS | Performed by: NURSE PRACTITIONER

## 2021-12-29 SDOH — ECONOMIC STABILITY: FOOD INSECURITY: WITHIN THE PAST 12 MONTHS, YOU WORRIED THAT YOUR FOOD WOULD RUN OUT BEFORE YOU GOT MONEY TO BUY MORE.: NEVER TRUE

## 2021-12-29 SDOH — ECONOMIC STABILITY: FOOD INSECURITY: WITHIN THE PAST 12 MONTHS, THE FOOD YOU BOUGHT JUST DIDN'T LAST AND YOU DIDN'T HAVE MONEY TO GET MORE.: NEVER TRUE

## 2021-12-29 ASSESSMENT — ENCOUNTER SYMPTOMS
ABDOMINAL PAIN: 0
COUGH: 0
CONSTIPATION: 0
BACK PAIN: 0
SINUS PRESSURE: 0
DIARRHEA: 0
SINUS PAIN: 0
COLOR CHANGE: 0
SHORTNESS OF BREATH: 0
WHEEZING: 0

## 2021-12-29 ASSESSMENT — PATIENT HEALTH QUESTIONNAIRE - PHQ9
1. LITTLE INTEREST OR PLEASURE IN DOING THINGS: 0
SUM OF ALL RESPONSES TO PHQ QUESTIONS 1-9: 0
2. FEELING DOWN, DEPRESSED OR HOPELESS: 0
DEPRESSION UNABLE TO ASSESS: FUNCTIONAL CAPACITY MOTIVATION LIMITS ACCURACY
SUM OF ALL RESPONSES TO PHQ9 QUESTIONS 1 & 2: 0

## 2021-12-29 ASSESSMENT — SOCIAL DETERMINANTS OF HEALTH (SDOH): HOW HARD IS IT FOR YOU TO PAY FOR THE VERY BASICS LIKE FOOD, HOUSING, MEDICAL CARE, AND HEATING?: NOT HARD AT ALL

## 2021-12-29 NOTE — ASSESSMENT & PLAN NOTE
Stable, controlled  Encouraged not to skip meals  Check A1c  Check UACR  Encouraged to schedule eye exam

## 2021-12-29 NOTE — PATIENT INSTRUCTIONS
Personalized Preventive Plan for Lynne Weems - 12/29/2021  Medicare offers a range of preventive health benefits. Some of the tests and screenings are paid in full while other may be subject to a deductible, co-insurance, and/or copay. Some of these benefits include a comprehensive review of your medical history including lifestyle, illnesses that may run in your family, and various assessments and screenings as appropriate. After reviewing your medical record and screening and assessments performed today your provider may have ordered immunizations, labs, imaging, and/or referrals for you. A list of these orders (if applicable) as well as your Preventive Care list are included within your After Visit Summary for your review. Other Preventive Recommendations:    · A preventive eye exam performed by an eye specialist is recommended every 1-2 years to screen for glaucoma; cataracts, macular degeneration, and other eye disorders. · A preventive dental visit is recommended every 6 months. · Try to get at least 150 minutes of exercise per week or 10,000 steps per day on a pedometer . · Order or download the FREE \"Exercise & Physical Activity: Your Everyday Guide\" from [employer]. Call 1-675.987.3143 or search [employer] online. · You need 3416-1372 mg of calcium and 6734-0460 IU of vitamin D per day. It is possible to meet your calcium requirement with diet alone, but a vitamin D supplement is usually necessary to meet this goal.  · When exposed to the sun, use a sunscreen that protects against both UVA and UVB radiation with an SPF of 30 or greater. Reapply every 2 to 3 hours or after sweating, drying off with a towel, or swimming. · Always wear a seat belt when traveling in a car. Always wear a helmet when riding a bicycle or motorcycle.

## 2021-12-29 NOTE — ASSESSMENT & PLAN NOTE
Discussed vaccines   Encouraged healthy diet and exercise   Check labs  Due for Shingrix  Up-to-date on Covid vaccines

## 2021-12-29 NOTE — PROGRESS NOTES
Medicare Annual Wellness Visit  Name: Sander Aase Date: 2021   MRN: <[id number]> Sex: Male   Age: 78 y.o. Ethnicity: Non- / Non    : 1955 Race: White (non-)      Helen Pederson is here for Medicare AWV    Screenings for behavioral, psychosocial and functional/safety risks, and cognitive dysfunction are all negative except as indicated below. These results, as well as other patient data from the [address] [facility] [address] form, are documented in Flowsheets linked to this Encounter. Feeling well overall. Diarrhea has improved with use of probiotic. Doing well with Lantus. Does not routinely take BS. Does have some hypoglycemia symptoms if he skips lunch. Denies CP, SOB, palpitations, or dizziness. Allergies   Allergen Reactions    Pravastatin Sodium          Prior to Visit Medications    Medication Sig Taking?  Authorizing Provider   Probiotic Product (PROBIOTIC-10 PO) Take by mouth Yes Historical Provider, MD   LANTUS SOLOSTAR 100 UNIT/ML injection pen INJECT 35 UNITS SUBCUTANEOUSLY NIGHTLY Yes LUCY Redd CNP   diazePAM (VALIUM) 10 MG tablet TAKE 1 TABLET BY MOUTH ONCE DAILY AS NEEDED FOR ANXIETY Yes LUCY Redd CNP   atorvastatin (LIPITOR) 40 MG tablet Take 1 tablet by mouth once daily Yes LUCY Redd CNP   FLUoxetine (PROZAC) 20 MG capsule Take 1 capsule by mouth once daily Yes LUCY Redd CNP   ibuprofen (ADVIL;MOTRIN) 600 MG tablet TAKE 1 TABLET BY MOUTH EVERY 6 HOURS WITH FOOD AS NEEDED FOR PAIN Yes LUCY Redd CNP   metFORMIN (GLUCOPHAGE-XR) 500 MG extended release tablet TAKE 4 TABLETS BY MOUTH ONCE DAILY WITH BREAKFAST Yes LUCY Redd CNP   lisinopril (PRINIVIL;ZESTRIL) 20 MG tablet Take 1 tablet by mouth once daily Yes LUCY Redd CNP   glipiZIDE (GLUCOTROL XL) 10 MG extended release tablet Take 1 tablet by mouth once daily Yes GonzaloLUCY Newberry CNP   FLUoxetine (PROZAC) 10 MG capsule TAKE 1 CAPSULE BY MOUTH ONCE DAILY Yes LUCY Rooney CNP   ketoconazole (NIZORAL) 2 % cream APPLY  CREAM TOPICALLY TWICE DAILY Yes Natan Luevano MD   clotrimazole-betamethasone (LOTRISONE) 1-0.05 % cream Apply topically 2 times daily. Yes Natan Luevano MD   vardenafil (LEVITRA) 20 MG tablet TAKE ONE TABLET BY MOUTH AS NEEDED FOR  ERECTILE  DYSFUNCTION Yes Natan Luevano MD   glucose blood VI test strips (ASCENSIA AUTODISC VI;ONE TOUCH ULTRA TEST VI) strip 1 each by In Vitro route daily Patient uses One Touch Ultra 2 meter. Patient needs test strips for meter. Testing 2-3 times daily per E11.9 Yes LUCY Rooney CNP   Lancets MISC Patient uses One Touch Ultra 2 meter. Testing 2 -3 times daily per E11.9 Yes LUCY Gomez CNP   NOVOFINE 32G X 6 MM MISC USE WITH INSULIN AT BEDTIME Yes Tad Schirmer, MD   Loratadine 10 MG CAPS Take 1 tablet by mouth daily Yes Historical Provider, MD   Glucose Blood (BLOOD GLUCOSE TEST STRIPS) STRP Patient is to test twice daily or as directed by physician. Yes Tad Schirmer, MD   Blood Glucose Monitoring Suppl JENNIFER Patient to test twice daily or advised by doctor Yes Tad Schirmer, MD   Accu-Chek Multiclix Lancets MISC USE ONE LANCET TO CHECK TWICE DAILY Yes Tad Schirmer, MD   ACCU-CHEK GEN PLUS strip USE ONE STRIP TO CHECK TWICE DAILY Yes Tad Schirmer, MD   Loperamide HCl (IMODIUM A-D PO) Take 2 tablets by mouth daily  Yes Historical Provider, MD   aspirin 81 MG tablet Take 81 mg by mouth daily. Yes Historical Provider, MD   EPINEPHrine (EPIPEN) 0.3 MG/0.3ML JENNIFER injection Use as directed for allergic reaction Yes Tad Schirmer, MD   Multiple Vitamin (MULTIVITAMIN PO) Take  by mouth daily.  Yes Historical Provider, MD   sildenafil (VIAGRA) 100 MG tablet Take 1 tablet by mouth as needed for Erectile Dysfunction  LUCY Rooney CNP   varenicline (CHANTIX CONTINUING MONTH SKY) 1 MG tablet Take 1 tablet by mouth daily  LUCY Morales CNP         Past Medical History:   Diagnosis Date    Anal cancer ([facility] [address]     Anxiety     Depression     Hyperlipidemia     Lumbar disc disorder     Herniation    Rectal cancer ([facility] [address] 3/5/2012    dx 1/2012 - colonoscopy and hemorrhoidectomy on 01/27/2012 and was found to have an anal tumor which turned out to be anal cancer. He is being seen by Dr. Mona Ruano at [facility] colon [facility] rectal group. He had a PET scan 3/6/2012 -unremarkable ,  and is to start chemo/radiation treatments on 03/19/2012. Dr. Trini August for XRT and Dr. Ross Perla for chemotherapy completed as of 5/14/2012 colonoscopy later 5/2012     Type 2 diabetes mellitus without complication [facility])        Past Surgical History:   Procedure Laterality Date    COLONOSCOPY  1/2012    rectal cancer and hemorrhoidectomy     COLONOSCOPY  10/30/2015    Dr. Rose Tolentino, Polyps, mild diverticulosis, hemorrhoids, colon polyps Transverse, descending- 3 Years    HAND [address] [place] [address]    Industrial accident   [address] [place] [address]         Family History   Adopted: Yes       CareTeam (Including outside providers/suppliers regularly involved in providing care):   Patient Care Team:  LUCY Morales CNP as PCP - General (Nurse Practitioner)  LUCY Morales CNP as PCP - [facility] Empaneled Provider    Wt Readings from Last 3 Encounters:   12/29/21 207 lb (93.9 kg)   11/16/21 206 lb (93.4 kg)   09/16/21 207 lb (93.9 kg)     Vitals:    12/29/21 0837   BP: (!) 142/80   Site: Left Upper Arm   Position: Sitting   Cuff Size: Medium Adult   Pulse: 92   Temp: 97.5 °F (36.4 °C)   SpO2: 95%   Weight: 207 lb (93.9 kg)   Height: 6' (1.829 m)     Body mass index is 28.07 kg/m². Based upon direct observation of the patient, evaluation of cognition reveals recent and remote memory intact. Review of Systems   Constitutional: Negative for chills, fatigue and fever.    HENT: Negative for congestion, sinus pressure and sinus pain. Respiratory: Negative for cough, shortness of breath and wheezing. Cardiovascular: Negative for chest pain and palpitations. Gastrointestinal: Negative for abdominal pain, constipation and diarrhea. Musculoskeletal: Negative for arthralgias, back pain and myalgias. Skin: Negative for color change, pallor and rash. Neurological: Negative for dizziness, syncope, weakness, light-headedness and headaches. Psychiatric/Behavioral: Negative for behavioral problems, confusion and sleep disturbance. The patient is not nervous/anxious. Physical Exam  Constitutional:       Appearance: He is well-developed. HENT:      Head: Normocephalic and atraumatic. Eyes:      Conjunctiva/sclera: Conjunctivae normal.      Pupils: Pupils are equal, round, and reactive to light. Neck:      Thyroid: No thyromegaly. Vascular: No JVD. Cardiovascular:      Rate and Rhythm: Normal rate and regular rhythm. Heart sounds: Normal heart sounds. Pulmonary:      Effort: Pulmonary effort is normal. No respiratory distress. Breath sounds: Normal breath sounds. No wheezing. Abdominal:      General: Bowel sounds are normal.      Palpations: Abdomen is soft. Musculoskeletal:         General: No deformity. Normal range of motion. Cervical back: Normal range of motion and neck supple. Skin:     General: Skin is warm and dry. Capillary Refill: Capillary refill takes less than 2 seconds. Neurological:      Mental Status: He is alert and oriented to person, place, and time. Psychiatric:         Behavior: Behavior normal.        Patient's complete Health Risk Assessment and screening values have been reviewed and are found in Flowsheets. The following problems were reviewed today and where indicated follow up appointments were made and/or referrals ordered.     Positive Risk Factor Screenings with Interventions:       Depression:  Depression Unable to Assess: Functional capacity motivation limits accuracy  PHQ-2 Score: 0  PHQ-9 Total Score: 0    Severity:1-4 = minimal depression, 5-9 = mild depression, 10-14 = moderate depression, 15-19 = moderately severe depression, 20-27 = severe depression     Substance History:  Social History     Tobacco History     Smoking Status  Current Every Day Smoker Smoking Frequency  1 pack/day for 39 years (39 pk yrs) Smoking Tobacco Type  Cigarettes    Smokeless Tobacco Use  Never Used          Alcohol History     Alcohol Use Status  Yes Drinks/Week  0 Standard drinks or equivalent per week Amount  0.0 standard drinks of alcohol/wk Comment  Occasional beer          Drug Use     Drug Use Status  No          Sexual Activity     Sexually Active  Yes Partners  Female               Alcohol Screening:       A score of 8 or more is associated with harmful or hazardous drinking. A score of 13 or more in women, and 15 or more in men, is likely to indicate alcohol dependence. Substance Abuse Interventions:  · Tobacco abuse:  tobacco cessation tips and resources provided    General Health and ACP:  General  In general, how would you say your health is?: Good  In the past 7 days, have you experienced any of the following?  New or Increased Pain, New or Increased Fatigue, Loneliness, Social Isolation, Stress or Anger?: None of These  Do you get the social and emotional support that you need?: Yes  Do you have a Living Will?: (!) No  Advance Directives     Power of [address] [place] [address] Will ACP-Advance Directive ACP-Power of     Not on File Not on File Not on File Not on File      General Health Risk Interventions:  · No Living Will: Advance Care Planning addressed with patient today    Health Habits/Nutrition:  Health Habits/Nutrition  Do you exercise for at least 20 minutes 2-3 times per week?: (!) No  Have you lost any weight without trying in the past 3 months?: No  Do you eat only one meal per day?: (!) Yes  Have you seen the dentist within the past year?: (!) No  Body mass index: (!) 28.07  Health Habits/Nutrition Interventions:  · Inadequate physical activity:  patient is not ready to increase his/her physical activity level at this time    Hearing/Vision:  No exam data present  Hearing/Vision  Do you or your family notice any trouble with your hearing that hasn't been managed with hearing aids?: (!) Yes  Do you have difficulty driving, watching TV, or doing any of your daily activities because of your eyesight?: No  Have you had an eye exam within the past year?: Yes  Hearing/Vision Interventions:  · Hearing concerns:  patient declines any further evaluation/treatment for hearing issues    Safety:  Safety  Do you have working smoke detectors?: Yes  Have all throw rugs been removed or fastened?: (!) No  Do you have non-slip mats or surfaces in all bathtubs/showers?: Yes  Do all of your stairways have a railing or banister?: Yes  Are your doorways, halls and stairs free of clutter?: Yes  Do you always fasten your seatbelt when you are in a car?: Yes  Safety Interventions:  · Home safety tips provided     Personalized Preventive Plan   Current Health Maintenance Status  Immunization History   Administered Date(s) Administered    COVID-19, Pfizer, PF, 30mcg/0.3mL 03/15/2021, 04/06/2021, 11/16/2021    Influenza 11/17/2011, 12/04/2012, 10/02/2013    Influenza Vaccine, unspecified formulation 09/13/2017    Influenza Virus Vaccine 11/03/2014, 11/11/2015, 09/19/2018    Influenza, Quadv, IM, PF (6 mo and older Fluzone, Flulaval, Fluarix, and 3 yrs and older Afluria) 12/20/2016    Influenza, Quadv, adjuvanted, 65 yrs +, IM, PF (Fluad) 09/16/2020, 09/16/2021    Influenza, Triv, inactivated, subunit, adjuvanted, IM (Fluad 65 yrs and older) 10/01/2019    Pneumococcal Conjugate 13-valent ([id number]) 10/01/2019    Pneumococcal Polysaccharide ([id number]) 10/07/2017    Td, unspecified formulation 09/19/2017    Tdap (Boostrix, Adacel) 09/05/2007 Health Maintenance   Topic Date Due    Shingles Vaccine (1 of 2) Never done    Diabetic foot exam  11/12/2020    Diabetic retinal exam  11/12/2020    Annual Wellness Visit (AWV)  12/24/2021    A1C test (Diabetic or Prediabetic)  01/09/2022    Diabetic microalbuminuria test  01/09/2022    Lipid screen  01/09/2022    Low dose CT lung screening  03/16/2022    Pneumococcal 65+ years Vaccine (2 of 2 - PPSV23) 10/07/2022    Colon cancer screen colonoscopy  11/13/2022    Potassium monitoring  11/16/2022    Creatinine monitoring  11/16/2022    DTaP/Tdap/Td vaccine (3 - Td or Tdap) 09/19/2027    Flu vaccine  Completed    COVID-19 Vaccine  Completed    AAA screen  Completed    Hepatitis C screen  Completed    Hepatitis A vaccine  Aged Out    Hib vaccine  Aged Out    Meningococcal (ACWY) vaccine  Aged Out     Recommendations for [email or web address] Due: see orders and patient instructions/AVS.  .   Recommended screening schedule for the next 5-10 years is provided to the patient in written form: see Patient Instructions/AVS.    Type 2 diabetes mellitus, with long-term current use of insulin (HCC)  Stable, controlled  Encouraged not to skip meals  Check A1c  Check UACR  Encouraged to schedule eye exam      Anxiety state  Stable, controlled  OARRS reviewed  Continue current regimen  Follow-up in 3 months    Diarrhea  Improved  Continue probiotic    Drug-induced erectile dysfunction  Stable, controlled  Continue Levitra    Essential hypertension  Stable, controlled  Continue current regimen    Hypercholesterolemia  Stable, controlled  Continue statin  Neck lipids    Moderate episode of recurrent major depressive disorder (HCC)  Stable, controlled  Continue fluoxetine    Tobacco abuse  Encouraged full cessation  Has Chantix prescription at home  Due for CT lung screening in March    Encounter for annual wellness visit (AWV) in Medicare patient  Discussed vaccines   Encouraged healthy diet and exercise Check labs  Due for Shingrix  Up-to-date on Covid vaccines      Gracie Cordova was seen today for medicare awv. Diagnoses and all orders for this visit:    Routine general medical examination at a health care facility  -     CBC; Future  -     Comprehensive Metabolic Panel; Future    Type 2 diabetes mellitus with other specified complication, with long-term current use of insulin (HCC)  -     MICROALBUMIN / CREATININE URINE RATIO; Future    Hypercholesterolemia  -     Lipid, Fasting; Future    Essential hypertension    Benign prostatic hyperplasia without lower urinary tract symptoms  -     PSA, Prostatic Specific Antigen; Future    Vitamin D deficiency  -     Vitamin D 25 Hydroxy;  Future    Anxiety state    Diarrhea, unspecified type    Drug-induced erectile dysfunction    Moderate episode of recurrent major depressive disorder ([facility] [address]    Tobacco abuse    Encounter for annual wellness visit (AWV) in Medicare patient

## 2022-01-04 RX ORDER — IBUPROFEN 600 MG/1
TABLET ORAL
Qty: 120 TABLET | Refills: 0 | Status: SHIPPED | OUTPATIENT
Start: 2022-01-04 | End: 2022-02-10

## 2022-01-08 DIAGNOSIS — N40.0 BENIGN PROSTATIC HYPERPLASIA WITHOUT LOWER URINARY TRACT SYMPTOMS: ICD-10-CM

## 2022-01-08 DIAGNOSIS — E11.69 TYPE 2 DIABETES MELLITUS WITH OTHER SPECIFIED COMPLICATION, WITH LONG-TERM CURRENT USE OF INSULIN (HCC): ICD-10-CM

## 2022-01-08 DIAGNOSIS — Z79.4 TYPE 2 DIABETES MELLITUS WITH OTHER SPECIFIED COMPLICATION, WITH LONG-TERM CURRENT USE OF INSULIN (HCC): ICD-10-CM

## 2022-01-08 DIAGNOSIS — E55.9 VITAMIN D DEFICIENCY: ICD-10-CM

## 2022-01-08 DIAGNOSIS — Z00.00 ROUTINE GENERAL MEDICAL EXAMINATION AT A HEALTH CARE FACILITY: ICD-10-CM

## 2022-01-08 DIAGNOSIS — E78.00 HYPERCHOLESTEROLEMIA: ICD-10-CM

## 2022-01-08 LAB
A/G RATIO: 1.5 (ref 1.1–2.2)
ALBUMIN SERPL-MCNC: 4.3 G/DL (ref 3.4–5)
ALP BLD-CCNC: 92 U/L (ref 40–129)
ALT SERPL-CCNC: 28 U/L (ref 10–40)
ANION GAP SERPL CALCULATED.3IONS-SCNC: 16 MMOL/L (ref 3–16)
AST SERPL-CCNC: 20 U/L (ref 15–37)
BILIRUB SERPL-MCNC: 0.7 MG/DL (ref 0–1)
BUN BLDV-MCNC: 10 MG/DL (ref 7–20)
CALCIUM SERPL-MCNC: 9.9 MG/DL (ref 8.3–10.6)
CHLORIDE BLD-SCNC: 100 MMOL/L (ref 99–110)
CHOLESTEROL, FASTING: 108 MG/DL (ref 0–199)
CO2: 24 MMOL/L (ref 21–32)
CREAT SERPL-MCNC: 0.7 MG/DL (ref 0.8–1.3)
CREATININE URINE: 258.8 MG/DL (ref 39–259)
GFR AFRICAN AMERICAN: >60
GFR NON-AFRICAN AMERICAN: >60
GLUCOSE BLD-MCNC: 122 MG/DL (ref 70–99)
HCT VFR BLD CALC: 47.7 % (ref 40.5–52.5)
HDLC SERPL-MCNC: 42 MG/DL (ref 40–60)
HEMOGLOBIN: 16.8 G/DL (ref 13.5–17.5)
LDL CHOLESTEROL CALCULATED: 32 MG/DL
MCH RBC QN AUTO: 31.7 PG (ref 26–34)
MCHC RBC AUTO-ENTMCNC: 35.3 G/DL (ref 31–36)
MCV RBC AUTO: 89.9 FL (ref 80–100)
MICROALBUMIN UR-MCNC: 60.8 MG/DL
MICROALBUMIN/CREAT UR-RTO: 234.9 MG/G (ref 0–30)
PDW BLD-RTO: 13.2 % (ref 12.4–15.4)
PLATELET # BLD: 282 K/UL (ref 135–450)
PMV BLD AUTO: 7.4 FL (ref 5–10.5)
POTASSIUM SERPL-SCNC: 4.8 MMOL/L (ref 3.5–5.1)
PROSTATE SPECIFIC ANTIGEN: 0.42 NG/ML (ref 0–4)
RBC # BLD: 5.31 M/UL (ref 4.2–5.9)
SODIUM BLD-SCNC: 140 MMOL/L (ref 136–145)
TOTAL PROTEIN: 7.2 G/DL (ref 6.4–8.2)
TRIGLYCERIDE, FASTING: 168 MG/DL (ref 0–150)
VITAMIN D 25-HYDROXY: 26.4 NG/ML
VLDLC SERPL CALC-MCNC: 34 MG/DL
WBC # BLD: 8.5 K/UL (ref 4–11)

## 2022-01-12 RX ORDER — GLIPIZIDE 10 MG/1
TABLET, FILM COATED, EXTENDED RELEASE ORAL
Qty: 90 TABLET | Refills: 0 | Status: SHIPPED | OUTPATIENT
Start: 2022-01-12 | End: 2022-04-11

## 2022-01-24 ENCOUNTER — TELEPHONE (OUTPATIENT)
Dept: INTERNAL MEDICINE CLINIC | Age: 67
End: 2022-01-24

## 2022-01-24 DIAGNOSIS — F41.9 ANXIETY: ICD-10-CM

## 2022-01-24 RX ORDER — DIAZEPAM 10 MG/1
TABLET ORAL
Qty: 28 TABLET | Refills: 0 | Status: SHIPPED | OUTPATIENT
Start: 2022-01-24 | End: 2022-02-21 | Stop reason: SDUPTHER

## 2022-01-24 RX ORDER — FLUOXETINE 10 MG/1
CAPSULE ORAL
Qty: 90 CAPSULE | Refills: 0 | Status: SHIPPED | OUTPATIENT
Start: 2022-01-24 | End: 2022-01-24 | Stop reason: SDUPTHER

## 2022-01-24 RX ORDER — FLUOXETINE 10 MG/1
CAPSULE ORAL
Qty: 90 CAPSULE | Refills: 0 | Status: SHIPPED | OUTPATIENT
Start: 2022-01-24 | End: 2022-04-22

## 2022-01-24 NOTE — TELEPHONE ENCOUNTER
Patient requesting a medication refill.   Medication diazePAM (VALIUM)  Dosage 10 MG tablet   FrequencyTAKE 1 TABLET BY MOUTH ONCE DAILY AS NEEDED FOR ANXIETY  Last filled on 12/22/21  [address] -  [phone]      LOV: 12/29/21  FOV: 3/29/22

## 2022-01-25 RX ORDER — LISINOPRIL 20 MG/1
TABLET ORAL
Qty: 90 TABLET | Refills: 0 | Status: SHIPPED | OUTPATIENT
Start: 2022-01-25 | End: 2022-04-22

## 2022-02-10 RX ORDER — IBUPROFEN 600 MG/1
TABLET ORAL
Qty: 120 TABLET | Refills: 0 | Status: SHIPPED | OUTPATIENT
Start: 2022-02-10 | End: 2022-03-21

## 2022-02-21 ENCOUNTER — TELEPHONE (OUTPATIENT)
Dept: FAMILY MEDICINE CLINIC | Age: 67
End: 2022-02-21

## 2022-02-21 DIAGNOSIS — F41.9 ANXIETY: ICD-10-CM

## 2022-02-21 RX ORDER — DIAZEPAM 10 MG/1
TABLET ORAL
Qty: 28 TABLET | Refills: 0 | Status: SHIPPED | OUTPATIENT
Start: 2022-02-21 | End: 2022-03-21

## 2022-02-21 NOTE — TELEPHONE ENCOUNTER
Patient requesting a medication refill.   Medication diazePAM (VALIUM)   Dosage  10 MG tablet   FrequencyTAKE 1 TABLET BY MOUTH ONCE DAILY AS NEEDED FOR ANXIETY  Last filled on 1/24/22  [address] -  [phone]      LOV: 12/29/21  FOV: 3/29/22

## 2022-02-25 ENCOUNTER — TELEPHONE (OUTPATIENT)
Dept: CASE MANAGEMENT | Age: 67
End: 2022-02-25

## 2022-03-04 DIAGNOSIS — E11.9 TYPE 2 DIABETES MELLITUS WITHOUT COMPLICATION, UNSPECIFIED WHETHER LONG TERM INSULIN USE (HCC): ICD-10-CM

## 2022-03-04 RX ORDER — METFORMIN HYDROCHLORIDE 500 MG/1
TABLET, EXTENDED RELEASE ORAL
Qty: 360 TABLET | Refills: 0 | Status: SHIPPED | OUTPATIENT
Start: 2022-03-04 | End: 2022-06-03

## 2022-03-20 DIAGNOSIS — F41.9 ANXIETY: ICD-10-CM

## 2022-03-20 DIAGNOSIS — E11.9 TYPE 2 DIABETES MELLITUS WITHOUT COMPLICATION, UNSPECIFIED WHETHER LONG TERM INSULIN USE (HCC): ICD-10-CM

## 2022-03-21 RX ORDER — IBUPROFEN 600 MG/1
TABLET ORAL
Qty: 120 TABLET | Refills: 0 | Status: SHIPPED | OUTPATIENT
Start: 2022-03-21 | End: 2022-04-25

## 2022-03-21 RX ORDER — INSULIN GLARGINE 100 [IU]/ML
INJECTION, SOLUTION SUBCUTANEOUS
Qty: 15 ML | Refills: 0 | Status: SHIPPED | OUTPATIENT
Start: 2022-03-21 | End: 2022-06-13

## 2022-03-21 RX ORDER — ATORVASTATIN CALCIUM 40 MG/1
TABLET, FILM COATED ORAL
Qty: 90 TABLET | Refills: 1 | Status: SHIPPED | OUTPATIENT
Start: 2022-03-21 | End: 2022-09-14

## 2022-03-21 RX ORDER — DIAZEPAM 10 MG/1
TABLET ORAL
Qty: 28 TABLET | Refills: 0 | Status: SHIPPED | OUTPATIENT
Start: 2022-03-21 | End: 2022-04-18 | Stop reason: SDUPTHER

## 2022-03-29 ENCOUNTER — OFFICE VISIT (OUTPATIENT)
Dept: FAMILY MEDICINE CLINIC | Age: 67
End: 2022-03-29
Payer: MEDICARE

## 2022-03-29 VITALS
WEIGHT: 204 LBS | TEMPERATURE: 97.1 F | HEART RATE: 82 BPM | SYSTOLIC BLOOD PRESSURE: 104 MMHG | OXYGEN SATURATION: 95 % | DIASTOLIC BLOOD PRESSURE: 76 MMHG | BODY MASS INDEX: 27.67 KG/M2

## 2022-03-29 DIAGNOSIS — Z72.0 TOBACCO ABUSE: ICD-10-CM

## 2022-03-29 DIAGNOSIS — Z87.891 PERSONAL HISTORY OF TOBACCO USE: ICD-10-CM

## 2022-03-29 DIAGNOSIS — E11.69 TYPE 2 DIABETES MELLITUS WITH OTHER SPECIFIED COMPLICATION, WITH LONG-TERM CURRENT USE OF INSULIN (HCC): Primary | ICD-10-CM

## 2022-03-29 DIAGNOSIS — I10 ESSENTIAL HYPERTENSION: ICD-10-CM

## 2022-03-29 DIAGNOSIS — Z79.4 TYPE 2 DIABETES MELLITUS WITH OTHER SPECIFIED COMPLICATION, WITH LONG-TERM CURRENT USE OF INSULIN (HCC): Primary | ICD-10-CM

## 2022-03-29 DIAGNOSIS — F33.1 MODERATE EPISODE OF RECURRENT MAJOR DEPRESSIVE DISORDER (HCC): ICD-10-CM

## 2022-03-29 LAB — HBA1C MFR BLD: 7 %

## 2022-03-29 PROCEDURE — G0296 VISIT TO DETERM LDCT ELIG: HCPCS | Performed by: NURSE PRACTITIONER

## 2022-03-29 PROCEDURE — G8417 CALC BMI ABV UP PARAM F/U: HCPCS | Performed by: NURSE PRACTITIONER

## 2022-03-29 PROCEDURE — 1123F ACP DISCUSS/DSCN MKR DOCD: CPT | Performed by: NURSE PRACTITIONER

## 2022-03-29 PROCEDURE — G8427 DOCREV CUR MEDS BY ELIG CLIN: HCPCS | Performed by: NURSE PRACTITIONER

## 2022-03-29 PROCEDURE — G8484 FLU IMMUNIZE NO ADMIN: HCPCS | Performed by: NURSE PRACTITIONER

## 2022-03-29 PROCEDURE — 3051F HG A1C>EQUAL 7.0%<8.0%: CPT | Performed by: NURSE PRACTITIONER

## 2022-03-29 PROCEDURE — 4040F PNEUMOC VAC/ADMIN/RCVD: CPT | Performed by: NURSE PRACTITIONER

## 2022-03-29 PROCEDURE — 83036 HEMOGLOBIN GLYCOSYLATED A1C: CPT | Performed by: NURSE PRACTITIONER

## 2022-03-29 PROCEDURE — 99214 OFFICE O/P EST MOD 30 MIN: CPT | Performed by: NURSE PRACTITIONER

## 2022-03-29 PROCEDURE — 4004F PT TOBACCO SCREEN RCVD TLK: CPT | Performed by: NURSE PRACTITIONER

## 2022-03-29 PROCEDURE — 3017F COLORECTAL CA SCREEN DOC REV: CPT | Performed by: NURSE PRACTITIONER

## 2022-03-29 PROCEDURE — 2022F DILAT RTA XM EVC RTNOPTHY: CPT | Performed by: NURSE PRACTITIONER

## 2022-03-29 ASSESSMENT — ENCOUNTER SYMPTOMS
COLOR CHANGE: 0
VOMITING: 0
DIARRHEA: 0
EYE REDNESS: 0
BACK PAIN: 0
SORE THROAT: 0
RHINORRHEA: 0
COUGH: 0
WHEEZING: 0
SHORTNESS OF BREATH: 0
ABDOMINAL PAIN: 0
CONSTIPATION: 0
EYE ITCHING: 0
NAUSEA: 0
CHEST TIGHTNESS: 0
BLOOD IN STOOL: 0
SINUS PRESSURE: 0

## 2022-03-29 NOTE — PROGRESS NOTES
Low Dose CT (LDCT) Lung Screening criteria met:  Tina Burton (:  1955) is a 78 y.o. male,Established patient, here for evaluation of the following chief complaint(s):  Diabetes      ASSESSMENT/PLAN:  1. Type 2 diabetes mellitus with other specified complication, with long-term current use of insulin ([facility])  Assessment & Plan:   Stable, controlled  No changes  Continue current treatment plan     Orders:  -     POCT glycosylated hemoglobin (Hb A1C)  2. Personal history of tobacco use  -     KS VISIT TO DISCUSS LUNG CA SCREEN W LDCT  -     CT Lung Screen (Annual); Future  3. Moderate episode of recurrent major depressive disorder [facility])  Assessment & Plan:   Working through the process of losing his friend   Support offered    4. Tobacco abuse  Assessment & Plan:   The risks related to smoking were reviewed with the patient. Recommend maintaining a smoke-free lifestyle. Products available for smoking cessation were discussed in detail. Ct lung screen reordered  5. Essential hypertension  Assessment & Plan:   Stable, controlled  No changes  Continue current treatment plan       No follow-ups on file. SUBJECTIVE/OBJECTIVE:  HPI  Patient in the office today to follow up on chronic medical conditions and health maintenance. He has been doing well overall since seen back in December. His A1c today shows controlled. He has been taking his medications as prescribed without any issues or complications. He is due for a CT lung screen to be repeated from last year. He has been prescribed Chantix in the past which was effective for him, but when it was pulled from market he went back to smoking. He is now smoking about 8 cigarettes a day. He is interested in quitting. He also reports he recently lost his best friend, and he has not grieved yet he feels. His services will be the end of the week.    Current Outpatient Medications   Medication Sig Dispense Refill    atorvastatin (LIPITOR) 40 MG tablet Take 1 tablet by mouth once daily 90 tablet 1    ibuprofen (ADVIL;MOTRIN) 600 MG tablet TAKE 1 TABLET BY MOUTH EVERY 6 HOURS WITH FOOD AS NEEDED FOR PAIN 120 tablet 0    LANTUS SOLOSTAR 100 UNIT/ML injection pen INJECT 35 UNITS SUBCUTANEOUSLY NIGHTLY 15 mL 0    diazePAM (VALIUM) 10 MG tablet TAKE 1 TABLET BY MOUTH ONCE DAILY AS NEEDED FOR ANXIETY 28 tablet 0    metFORMIN (GLUCOPHAGE-XR) 500 MG extended release tablet TAKE 4 TABLETS BY MOUTH ONCE DAILY WITH BREAKFAST 360 tablet 0    lisinopril (PRINIVIL;ZESTRIL) 20 MG tablet Take 1 tablet by mouth once daily 90 tablet 0    FLUoxetine (PROZAC) 10 MG capsule 1 tablet by mouth daily 90 capsule 0    glipiZIDE (GLUCOTROL XL) 10 MG extended release tablet Take 1 tablet by mouth once daily 90 tablet 0    Probiotic Product (PROBIOTIC-10 PO) Take by mouth      FLUoxetine (PROZAC) 20 MG capsule Take 1 capsule by mouth once daily 90 capsule 3    clotrimazole-betamethasone (LOTRISONE) 1-0.05 % cream Apply topically 2 times daily. 15 g 1    vardenafil (LEVITRA) 20 MG tablet TAKE ONE TABLET BY MOUTH AS NEEDED FOR  ERECTILE  DYSFUNCTION 10 tablet 0    glucose blood VI test strips (ASCENSIA AUTODISC VI;ONE TOUCH ULTRA TEST VI) strip 1 each by In Vitro route daily Patient uses One Touch Ultra 2 meter. Patient needs test strips for meter. Testing 2-3 times daily per E11.9 100 each 3    Lancets MISC Patient uses One Touch Ultra 2 meter. Testing 2 -3 times daily per E11.9 100 each 3    NOVOFINE 32G X 6 MM MISC USE WITH INSULIN AT BEDTIME 100 each 3    Glucose Blood (BLOOD GLUCOSE TEST STRIPS) STRP Patient is to test twice daily or as directed by physician.  100 strip 3    Blood Glucose Monitoring Suppl JENNIFER Patient to test twice daily or advised by doctor 1 Device 0    Accu-Chek Multiclix Lancets MISC USE ONE LANCET TO CHECK TWICE DAILY 100 each 3    ACCU-CHEK GEN PLUS strip USE ONE STRIP TO CHECK TWICE DAILY 100 each 3    Loperamide HCl (IMODIUM A-D PO) Take 2 tablets by mouth daily       aspirin 81 MG tablet Take 81 mg by mouth daily.  EPINEPHrine (EPIPEN) 0.3 MG/0.3ML JENNIFER injection Use as directed for allergic reaction 2 Device 3    Multiple Vitamin (MULTIVITAMIN PO) Take  by mouth daily. No current facility-administered medications for this visit. Review of Systems   Constitutional: Negative for chills, fatigue and fever. HENT: Negative for congestion, ear pain, postnasal drip, rhinorrhea, sinus pressure, sneezing and sore throat. Eyes: Negative for redness and itching. Respiratory: Negative for cough, chest tightness, shortness of breath and wheezing. Cardiovascular: Negative for chest pain and palpitations. Gastrointestinal: Negative for abdominal pain, blood in stool, constipation, diarrhea, nausea and vomiting. Endocrine: Negative for cold intolerance and heat intolerance. Genitourinary: Negative for difficulty urinating, dysuria, flank pain, frequency, hematuria and urgency. Musculoskeletal: Negative for arthralgias, back pain, joint swelling and myalgias. Skin: Negative for color change, pallor, rash and wound. Allergic/Immunologic: Negative for environmental allergies and food allergies. Neurological: Negative for dizziness, seizures, syncope, weakness, light-headedness, numbness and headaches. Hematological: Negative for adenopathy. Does not bruise/bleed easily. Psychiatric/Behavioral: Negative for confusion, sleep disturbance and suicidal ideas. The patient is not nervous/anxious and is not hyperactive. Vitals:    03/29/22 1335   BP: 104/76   Site: Left Upper Arm   Position: Sitting   Cuff Size: Medium Adult   Pulse: 82   Temp: 97.1 °F (36.2 °C)   SpO2: 95%   Weight: 204 lb (92.5 kg)       Physical Exam  Constitutional:       Appearance: Normal appearance. He is well-developed. HENT:      Head: Normocephalic and atraumatic.       Right Ear: Hearing normal.      Left Ear: Hearing normal.      Nose: No mucosal edema.      Right Sinus: No maxillary sinus tenderness or frontal sinus tenderness. Left Sinus: No maxillary sinus tenderness or frontal sinus tenderness. Mouth/Throat: Tonsils: No tonsillar abscesses. Eyes:      Extraocular Movements: Extraocular movements intact. Pupils: Pupils are equal, round, and reactive to light. Cardiovascular:      Rate and Rhythm: Normal rate and regular rhythm. Pulses: Normal pulses. Heart sounds: Normal heart sounds. Pulmonary:      Effort: Pulmonary effort is normal.      Breath sounds: Normal breath sounds. Lymphadenopathy:      Head:      Right side of head: No submental, submandibular, tonsillar, preauricular, posterior auricular or occipital adenopathy. Left side of head: No submental, submandibular, tonsillar, preauricular, posterior auricular or occipital adenopathy. Skin:     General: Skin is warm and dry. Neurological:      Mental Status: He is alert. Psychiatric:         Mood and Affect: Mood normal.         Behavior: Behavior normal.           On this date 3/29/2022 I have spent 30 minutes reviewing previous notes, test results and face to face with the patient discussing the diagnosis and importance of compliance with the treatment plan as well as documenting on the day of the visit. An electronic signature was used to authenticate this note. --Stone Day, LUCY - CNP    Age 50-77(Medicare) or 50-80 (USPSTF)   Pack year smoking >20   Still smoking or less than 15 year since quit   No sign or symptoms of lung cancer   > 11 months since last LDCT     Risks and benefits of lung cancer screening with LDCT scans discussed:    Significance of positive screen - False-positive LDCT results often occur. 95% of all positive results do not lead to a diagnosis of cancer. Usually further imaging can resolve most false-positive results; however, some patients may require invasive procedures.     Over diagnosis risk - 10% to 12% of screen-detected lung cancer cases are over diagnosed--that is, the cancer would not have been detected in the patient's lifetime without the screening. Need for follow up screens annually to continue lung cancer screening effectiveness     Risks associated with radiation from annual LDCT- Radiation exposure is about the same as for a mammogram, which is about 1/3 of the annual background radiation exposure from everyday life. Starting screening at age 54 is not likely to increase cancer risk from radiation exposure. Patients with comorbidities resulting in life expectancy of < 10 years, or that would preclude treatment of an abnormality identified on CT, should not be screened due to lack of benefit.     To obtain maximal benefit from this screening, smoking cessation and long-term abstinence from smoking is critical

## 2022-03-29 NOTE — ASSESSMENT & PLAN NOTE
The risks related to smoking were reviewed with the patient. Recommend maintaining a smoke-free lifestyle. Products available for smoking cessation were discussed in detail.   Ct lung screen reordered

## 2022-04-11 RX ORDER — GLIPIZIDE 10 MG/1
TABLET, FILM COATED, EXTENDED RELEASE ORAL
Qty: 90 TABLET | Refills: 0 | Status: SHIPPED | OUTPATIENT
Start: 2022-04-11 | End: 2022-07-11

## 2022-04-15 ENCOUNTER — TELEPHONE (OUTPATIENT)
Dept: CASE MANAGEMENT | Age: 67
End: 2022-04-15

## 2022-04-18 DIAGNOSIS — F41.9 ANXIETY: ICD-10-CM

## 2022-04-18 RX ORDER — DIAZEPAM 10 MG/1
TABLET ORAL
Qty: 28 TABLET | Refills: 0 | Status: SHIPPED | OUTPATIENT
Start: 2022-04-18 | End: 2022-05-13

## 2022-04-19 ENCOUNTER — TELEPHONE (OUTPATIENT)
Dept: FAMILY MEDICINE CLINIC | Age: 67
End: 2022-04-19

## 2022-04-19 NOTE — TELEPHONE ENCOUNTER
----- Message from April Nirali sent at 4/19/2022 11:51 AM EDT -----  Subject: Message to Provider    QUESTIONS  Information for Provider? Patient requesting a call back from Nichole due   to billing issues. Please call patient back to advise, thank you   ---------------------------------------------------------------------------  --------------  CALL BACK INFO  What is the best way for the office to contact you? OK to leave message on   voicemail  Preferred Call Back Phone Number? [phone]  ---------------------------------------------------------------------------  --------------  SCRIPT ANSWERS  Relationship to Patient?  Self

## 2022-04-22 RX ORDER — FLUOXETINE 10 MG/1
CAPSULE ORAL
Qty: 90 CAPSULE | Refills: 0 | Status: SHIPPED | OUTPATIENT
Start: 2022-04-22 | End: 2022-07-22

## 2022-04-22 RX ORDER — LISINOPRIL 20 MG/1
TABLET ORAL
Qty: 90 TABLET | Refills: 1 | Status: SHIPPED | OUTPATIENT
Start: 2022-04-22 | End: 2022-10-23

## 2022-04-25 ENCOUNTER — TELEPHONE (OUTPATIENT)
Dept: FAMILY MEDICINE CLINIC | Age: 67
End: 2022-04-25

## 2022-04-25 DIAGNOSIS — H91.93 BILATERAL HEARING LOSS, UNSPECIFIED HEARING LOSS TYPE: Primary | ICD-10-CM

## 2022-04-25 DIAGNOSIS — H90.3 SENSORINEURAL HEARING LOSS (SNHL) OF BOTH EARS: ICD-10-CM

## 2022-04-25 RX ORDER — IBUPROFEN 600 MG/1
TABLET ORAL
Qty: 120 TABLET | Refills: 0 | Status: SHIPPED | OUTPATIENT
Start: 2022-04-25 | End: 2022-05-23

## 2022-04-25 NOTE — TELEPHONE ENCOUNTER
Pt asking for a referral for [facility]  Believes there is blockage in his right ear  He has an appt Thursday  Fax: [phone]

## 2022-04-25 NOTE — TELEPHONE ENCOUNTER
[address] [facility]  [address] [facility], [address]  Ph: [phone]

## 2022-04-26 ENCOUNTER — TELEPHONE (OUTPATIENT)
Dept: FAMILY MEDICINE CLINIC | Age: 67
End: 2022-04-26

## 2022-04-26 NOTE — TELEPHONE ENCOUNTER
Pt has R[facility]  They won't let him have Nora Santos as his PCP because she's an NP and they consider her a specialist  He was told if he sees Nora Santos, she would have to bill it under Dr Luis A Santana  He has been seeing Nora Santos as his PCP for almost 2 years  Any insight?

## 2022-05-03 ENCOUNTER — OFFICE VISIT (OUTPATIENT)
Dept: FAMILY MEDICINE CLINIC | Age: 67
End: 2022-05-03
Payer: MEDICARE

## 2022-05-03 VITALS
HEART RATE: 67 BPM | TEMPERATURE: 97.5 F | DIASTOLIC BLOOD PRESSURE: 78 MMHG | WEIGHT: 204 LBS | BODY MASS INDEX: 27.67 KG/M2 | OXYGEN SATURATION: 95 % | SYSTOLIC BLOOD PRESSURE: 122 MMHG

## 2022-05-03 DIAGNOSIS — G57.12 MERALGIA PARESTHETICA OF LEFT SIDE: ICD-10-CM

## 2022-05-03 PROCEDURE — 4040F PNEUMOC VAC/ADMIN/RCVD: CPT | Performed by: NURSE PRACTITIONER

## 2022-05-03 PROCEDURE — 4004F PT TOBACCO SCREEN RCVD TLK: CPT | Performed by: NURSE PRACTITIONER

## 2022-05-03 PROCEDURE — G8427 DOCREV CUR MEDS BY ELIG CLIN: HCPCS | Performed by: NURSE PRACTITIONER

## 2022-05-03 PROCEDURE — 99214 OFFICE O/P EST MOD 30 MIN: CPT | Performed by: NURSE PRACTITIONER

## 2022-05-03 PROCEDURE — G8417 CALC BMI ABV UP PARAM F/U: HCPCS | Performed by: NURSE PRACTITIONER

## 2022-05-03 PROCEDURE — 3017F COLORECTAL CA SCREEN DOC REV: CPT | Performed by: NURSE PRACTITIONER

## 2022-05-03 PROCEDURE — 1123F ACP DISCUSS/DSCN MKR DOCD: CPT | Performed by: NURSE PRACTITIONER

## 2022-05-03 RX ORDER — PREDNISONE 20 MG/1
20 TABLET ORAL DAILY
Qty: 10 TABLET | Refills: 0 | Status: SHIPPED | OUTPATIENT
Start: 2022-05-03 | End: 2022-05-13

## 2022-05-03 ASSESSMENT — ENCOUNTER SYMPTOMS
CHEST TIGHTNESS: 0
RHINORRHEA: 0
NAUSEA: 0
DIARRHEA: 0
COUGH: 0
ABDOMINAL PAIN: 0
SHORTNESS OF BREATH: 0
VOMITING: 0
EYE ITCHING: 0
EYE REDNESS: 0
SORE THROAT: 0
CONSTIPATION: 0
SINUS PRESSURE: 0
COLOR CHANGE: 0
BACK PAIN: 0
BLOOD IN STOOL: 0
WHEEZING: 0

## 2022-05-03 NOTE — ASSESSMENT & PLAN NOTE
Reassurance about diagnosis. Provided with prednisone for symptom management advised to continue stretching exercises. Avoid putting direct pressure on left side. Recommend loosefitting clothing. Avoid restrictive clothing.

## 2022-05-03 NOTE — PROGRESS NOTES
Erika Stuart (:  [id number]) is a 78 y.o. male,Established patient, here for evaluation of the following chief complaint(s):  Leg Pain (left side tingling and sharp )      ASSESSMENT/PLAN:  1. Meralgia paresthetica of left side  Assessment & Plan:  Reassurance about diagnosis. Provided with prednisone for symptom management advised to continue stretching exercises. Avoid putting direct pressure on left side. Recommend loosefitting clothing. Avoid restrictive clothing. No follow-ups on file. SUBJECTIVE/OBJECTIVE:  Started 2 weeks ago. Numbness in left anterior thigh. Last all day, is albe to sleep. Numbness and then occasional sharp pain. Has stubmled a few times in past 2 weeks. He has not been taking anything for his symptoms.        Current Outpatient Medications   Medication Sig Dispense Refill    predniSONE (DELTASONE) 20 MG tablet Take 1 tablet by mouth daily for 10 days 10 tablet 0    ibuprofen (ADVIL;MOTRIN) 600 MG tablet TAKE 1 TABLET BY MOUTH EVERY 6 HOURS WITH FOOD AS NEEDED FOR PAIN 120 tablet 0    lisinopril (PRINIVIL;ZESTRIL) 20 MG tablet Take 1 tablet by mouth once daily 90 tablet 1    FLUoxetine (PROZAC) 10 MG capsule Take 1 capsule by mouth once daily 90 capsule 0    diazePAM (VALIUM) 10 MG tablet 1 tablet by mouth once daily as needed for anxiety 28 tablet 0    glipiZIDE (GLUCOTROL XL) 10 MG extended release tablet Take 1 tablet by mouth once daily 90 tablet 0    atorvastatin (LIPITOR) 40 MG tablet Take 1 tablet by mouth once daily 90 tablet 1    LANTUS SOLOSTAR 100 UNIT/ML injection pen INJECT 35 UNITS SUBCUTANEOUSLY NIGHTLY 15 mL 0    metFORMIN (GLUCOPHAGE-XR) 500 MG extended release tablet TAKE 4 TABLETS BY MOUTH ONCE DAILY WITH BREAKFAST 360 tablet 0    Probiotic Product (PROBIOTIC-10 PO) Take by mouth      FLUoxetine (PROZAC) 20 MG capsule Take 1 capsule by mouth once daily 90 capsule 3    clotrimazole-betamethasone (LOTRISONE) 1-0.05 % cream Apply topically 2 times daily. 15 g 1    vardenafil (LEVITRA) 20 MG tablet TAKE ONE TABLET BY MOUTH AS NEEDED FOR  ERECTILE  DYSFUNCTION 10 tablet 0    glucose blood VI test strips (ASCENSIA AUTODISC VI;ONE TOUCH ULTRA TEST VI) strip 1 each by In Vitro route daily Patient uses One Touch Ultra 2 meter. Patient needs test strips for meter. Testing 2-3 times daily per E11.9 100 each 3    Lancets MISC Patient uses One Touch Ultra 2 meter. Testing 2 -3 times daily per E11.9 100 each 3    NOVOFINE 32G X 6 MM MISC USE WITH INSULIN AT BEDTIME 100 each 3    Glucose Blood (BLOOD GLUCOSE TEST STRIPS) STRP Patient is to test twice daily or as directed by physician. 100 strip 3    Blood Glucose Monitoring Suppl JENNIFER Patient to test twice daily or advised by doctor 1 Device 0    Accu-Chek Multiclix Lancets MISC USE ONE LANCET TO CHECK TWICE DAILY 100 each 3    ACCU-CHEK GEN PLUS strip USE ONE STRIP TO CHECK TWICE DAILY 100 each 3    Loperamide HCl (IMODIUM A-D PO) Take 2 tablets by mouth daily       aspirin 81 MG tablet Take 81 mg by mouth daily.  Multiple Vitamin (MULTIVITAMIN PO) Take  by mouth daily. No current facility-administered medications for this visit. Review of Systems   Constitutional: Negative for chills, fatigue and fever. HENT: Negative for congestion, ear pain, postnasal drip, rhinorrhea, sinus pressure, sneezing and sore throat. Eyes: Negative for redness and itching. Respiratory: Negative for cough, chest tightness, shortness of breath and wheezing. Cardiovascular: Negative for chest pain and palpitations. Gastrointestinal: Negative for abdominal pain, blood in stool, constipation, diarrhea, nausea and vomiting. Endocrine: Negative for cold intolerance and heat intolerance. Genitourinary: Negative for difficulty urinating, dysuria, flank pain, frequency, hematuria and urgency. Musculoskeletal: Negative for arthralgias, back pain, joint swelling and myalgias. Left thigh pain and numbness     Skin: Negative for color change, pallor, rash and wound. Allergic/Immunologic: Negative for environmental allergies and food allergies. Neurological: Negative for dizziness, seizures, syncope, weakness, light-headedness, numbness and headaches. Hematological: Negative for adenopathy. Does not bruise/bleed easily. Psychiatric/Behavioral: Negative for confusion, sleep disturbance and suicidal ideas. The patient is not nervous/anxious and is not hyperactive. Vitals:    05/03/22 1457   BP: 122/78   Site: Left Upper Arm   Position: Sitting   Cuff Size: Medium Adult   Pulse: 67   Temp: 97.5 °F (36.4 °C)   SpO2: 95%   Weight: 204 lb (92.5 kg)       Physical Exam  Constitutional:       Appearance: Normal appearance. He is well-developed. HENT:      Head: Normocephalic and atraumatic. Right Ear: Hearing normal.      Left Ear: Hearing normal.      Nose: No mucosal edema. Right Sinus: No maxillary sinus tenderness or frontal sinus tenderness. Left Sinus: No maxillary sinus tenderness or frontal sinus tenderness. Mouth/Throat: Tonsils: No tonsillar abscesses. Eyes:      Extraocular Movements: Extraocular movements intact. Pupils: Pupils are equal, round, and reactive to light. Cardiovascular:      Rate and Rhythm: Normal rate and regular rhythm. Pulses: Normal pulses. Heart sounds: Normal heart sounds. Pulmonary:      Effort: Pulmonary effort is normal.      Breath sounds: Normal breath sounds. Lymphadenopathy:      Head:      Right side of head: No submental, submandibular, tonsillar, preauricular, posterior auricular or occipital adenopathy. Left side of head: No submental, submandibular, tonsillar, preauricular, posterior auricular or occipital adenopathy. Skin:     General: Skin is warm and dry. Neurological:      Mental Status: He is alert.       Comments: Elicits numbness to left upper thigh, no increased tenderness to palpation   Psychiatric:         Mood and Affect: Mood normal.         Behavior: Behavior normal.           On this date 5/3/2022 I have spent 30 minutes reviewing previous notes, test results and face to face with the patient discussing the diagnosis and importance of compliance with the treatment plan as well as documenting on the day of the visit. An electronic signature was used to authenticate this note.     --Leobardo Lyn, LUCY - CNP

## 2022-05-03 NOTE — PATIENT INSTRUCTIONS
Patient Education        Meralgia Paresthetica: Care Instructions  Your Care Instructions  Meralgia paresthetica (say \"muh-RAL-juh uly-pts-NMNR-ick-uh\") is pain and numbness in the outer part of your thigh. The pain might get worse after youwalk or stand for a long time. This pain and numbness occur when a nerve in your thigh is pinched (compressed). Sometimes the problem is caused by wearing tight clothing orbeing overweight. Most of the time the problem goes away on its own in a few months. Lowering any pressure on the thigh area may help. Wear loose clothes, and lose weight if youneed to. Follow-up care is a key part of your treatment and safety. Be sure to make and go to all appointments, and call your doctor if you are having problems. It's also a good idea to know your test results and keep alist of the medicines you take. How can you care for yourself at home?  Most times the problem gets better on its own. Try wearing loose clothing to see if this helps.  Lose weight if you need to. Talk with your doctor if you need help. When should you call for help? Watch closely for changes in your health, and be sure to contact your doctor if:     You have new symptoms, such as pain that gets worse or new numbness in your thigh.      You do not get better as expected. Where can you learn more? Go to [email or web address] org and sign in to your [employer] account. Enter J017 in the [facility] box to learn more about \"Meralgia Paresthetica: Care Instructions. \"     If you do not have an account, please click on the \"Sign Up Now\" link. Current as of: December 13, 2021               Content Version: 13.2  © 2006-2022 Healthwise, [employer]. Care instructions adapted under license by [facility] [facility] ([facility]).  If you have questions about a medical condition or this instruction, always ask your healthcare professional. [address]  any warranty or liability for your use of this information.

## 2022-05-13 ENCOUNTER — TELEPHONE (OUTPATIENT)
Dept: FAMILY MEDICINE CLINIC | Age: 67
End: 2022-05-13

## 2022-05-13 DIAGNOSIS — F41.9 ANXIETY: ICD-10-CM

## 2022-05-13 RX ORDER — DIAZEPAM 10 MG/1
TABLET ORAL
Qty: 28 TABLET | Refills: 0 | Status: SHIPPED | OUTPATIENT
Start: 2022-05-13 | End: 2022-06-13

## 2022-05-13 NOTE — TELEPHONE ENCOUNTER
Pt took the last prednisone yesterday  He has less pain and numbness in his left leg  He still has some pain and numbness but not like before

## 2022-05-16 NOTE — TELEPHONE ENCOUNTER
Msg given - Pt stated the pain is much more bearable - not as frequent, occasionally when he stands. Still has numbness and tingly left thigh. Overall much better.

## 2022-05-16 NOTE — TELEPHONE ENCOUNTER
He should continue to improve, if symptoms go backwards and he starts to develop more pain, please call office. He can also do some physical therapy, but this injury will take some time to fully recover.

## 2022-05-23 RX ORDER — IBUPROFEN 600 MG/1
TABLET ORAL
Qty: 120 TABLET | Refills: 0 | Status: SHIPPED | OUTPATIENT
Start: 2022-05-23 | End: 2022-06-27

## 2022-06-03 DIAGNOSIS — E11.9 TYPE 2 DIABETES MELLITUS WITHOUT COMPLICATION, UNSPECIFIED WHETHER LONG TERM INSULIN USE (HCC): ICD-10-CM

## 2022-06-03 RX ORDER — METFORMIN HYDROCHLORIDE 500 MG/1
TABLET, EXTENDED RELEASE ORAL
Qty: 360 TABLET | Refills: 0 | Status: SHIPPED | OUTPATIENT
Start: 2022-06-03 | End: 2022-08-31 | Stop reason: SDUPTHER

## 2022-06-11 DIAGNOSIS — F41.9 ANXIETY: ICD-10-CM

## 2022-06-12 DIAGNOSIS — E11.9 TYPE 2 DIABETES MELLITUS WITHOUT COMPLICATION, UNSPECIFIED WHETHER LONG TERM INSULIN USE (HCC): ICD-10-CM

## 2022-06-13 RX ORDER — INSULIN GLARGINE 100 [IU]/ML
INJECTION, SOLUTION SUBCUTANEOUS
Qty: 15 ML | Refills: 0 | Status: SHIPPED | OUTPATIENT
Start: 2022-06-13

## 2022-06-13 RX ORDER — DIAZEPAM 10 MG/1
TABLET ORAL
Qty: 28 TABLET | Refills: 0 | Status: SHIPPED | OUTPATIENT
Start: 2022-06-13 | End: 2022-07-08

## 2022-06-27 RX ORDER — IBUPROFEN 600 MG/1
TABLET ORAL
Qty: 120 TABLET | Refills: 0 | Status: SHIPPED | OUTPATIENT
Start: 2022-06-27 | End: 2022-08-08

## 2022-07-07 DIAGNOSIS — F41.9 ANXIETY: ICD-10-CM

## 2022-07-08 RX ORDER — DIAZEPAM 10 MG/1
TABLET ORAL
Qty: 28 TABLET | Refills: 0 | Status: SHIPPED | OUTPATIENT
Start: 2022-07-08 | End: 2022-08-08

## 2022-07-11 RX ORDER — GLIPIZIDE 10 MG/1
TABLET, FILM COATED, EXTENDED RELEASE ORAL
Qty: 90 TABLET | Refills: 0 | Status: SHIPPED | OUTPATIENT
Start: 2022-07-11 | End: 2022-10-10

## 2022-07-22 RX ORDER — FLUOXETINE 10 MG/1
CAPSULE ORAL
Qty: 90 CAPSULE | Refills: 0 | Status: SHIPPED | OUTPATIENT
Start: 2022-07-22 | End: 2022-10-20

## 2022-08-05 DIAGNOSIS — F41.9 ANXIETY: ICD-10-CM

## 2022-08-08 ENCOUNTER — TELEPHONE (OUTPATIENT)
Dept: FAMILY MEDICINE CLINIC | Age: 67
End: 2022-08-08

## 2022-08-08 RX ORDER — DIAZEPAM 10 MG/1
TABLET ORAL
Qty: 28 TABLET | Refills: 0 | Status: SHIPPED | OUTPATIENT
Start: 2022-08-08 | End: 2022-08-31 | Stop reason: SDUPTHER

## 2022-08-08 RX ORDER — IBUPROFEN 600 MG/1
TABLET ORAL
Qty: 120 TABLET | Refills: 0 | Status: SHIPPED | OUTPATIENT
Start: 2022-08-08 | End: 2022-09-06

## 2022-08-08 NOTE — TELEPHONE ENCOUNTER
diazePAM (VALIUM) 10 MG tablet  ibuprofen (ADVIL;MOTRIN) 600 MG tablet  [facility] [address]   [address], [address] [place] [address]   Phone:  [phone]  Fax:  [phone]

## 2022-08-30 ENCOUNTER — OFFICE VISIT (OUTPATIENT)
Dept: FAMILY MEDICINE CLINIC | Age: 67
End: 2022-08-30
Payer: MEDICARE

## 2022-08-30 VITALS
HEIGHT: 72 IN | SYSTOLIC BLOOD PRESSURE: 150 MMHG | BODY MASS INDEX: 27.5 KG/M2 | TEMPERATURE: 97.3 F | OXYGEN SATURATION: 96 % | WEIGHT: 203 LBS | DIASTOLIC BLOOD PRESSURE: 80 MMHG | HEART RATE: 88 BPM

## 2022-08-30 DIAGNOSIS — Z72.0 TOBACCO ABUSE: ICD-10-CM

## 2022-08-30 DIAGNOSIS — I10 ESSENTIAL HYPERTENSION: ICD-10-CM

## 2022-08-30 DIAGNOSIS — E11.9 TYPE 2 DIABETES MELLITUS WITHOUT COMPLICATION, UNSPECIFIED WHETHER LONG TERM INSULIN USE (HCC): Primary | ICD-10-CM

## 2022-08-30 DIAGNOSIS — F33.1 MODERATE EPISODE OF RECURRENT MAJOR DEPRESSIVE DISORDER (HCC): ICD-10-CM

## 2022-08-30 LAB — HBA1C MFR BLD: 6.5 %

## 2022-08-30 PROCEDURE — G8417 CALC BMI ABV UP PARAM F/U: HCPCS | Performed by: NURSE PRACTITIONER

## 2022-08-30 PROCEDURE — 99214 OFFICE O/P EST MOD 30 MIN: CPT | Performed by: NURSE PRACTITIONER

## 2022-08-30 PROCEDURE — 4004F PT TOBACCO SCREEN RCVD TLK: CPT | Performed by: NURSE PRACTITIONER

## 2022-08-30 PROCEDURE — 1123F ACP DISCUSS/DSCN MKR DOCD: CPT | Performed by: NURSE PRACTITIONER

## 2022-08-30 PROCEDURE — 3017F COLORECTAL CA SCREEN DOC REV: CPT | Performed by: NURSE PRACTITIONER

## 2022-08-30 PROCEDURE — G8427 DOCREV CUR MEDS BY ELIG CLIN: HCPCS | Performed by: NURSE PRACTITIONER

## 2022-08-30 PROCEDURE — 2022F DILAT RTA XM EVC RTNOPTHY: CPT | Performed by: NURSE PRACTITIONER

## 2022-08-30 PROCEDURE — 83036 HEMOGLOBIN GLYCOSYLATED A1C: CPT | Performed by: NURSE PRACTITIONER

## 2022-08-30 PROCEDURE — 3044F HG A1C LEVEL LT 7.0%: CPT | Performed by: NURSE PRACTITIONER

## 2022-08-30 RX ORDER — VARENICLINE TARTRATE 1 MG/1
1 TABLET, FILM COATED ORAL 2 TIMES DAILY
Qty: 60 TABLET | Refills: 3 | Status: SHIPPED | OUTPATIENT
Start: 2022-08-30

## 2022-08-30 RX ORDER — MULTIVIT-MIN/IRON/FOLIC ACID/K 18-600-40
CAPSULE ORAL
COMMUNITY

## 2022-08-30 RX ORDER — VARENICLINE TARTRATE 0.5 MG/1
.5-1 TABLET, FILM COATED ORAL SEE ADMIN INSTRUCTIONS
Qty: 57 TABLET | Refills: 0 | Status: SHIPPED | OUTPATIENT
Start: 2022-08-30 | End: 2022-10-03

## 2022-08-30 ASSESSMENT — PATIENT HEALTH QUESTIONNAIRE - PHQ9
5. POOR APPETITE OR OVEREATING: 1
2. FEELING DOWN, DEPRESSED OR HOPELESS: 3
9. THOUGHTS THAT YOU WOULD BE BETTER OFF DEAD, OR OF HURTING YOURSELF: 0
10. IF YOU CHECKED OFF ANY PROBLEMS, HOW DIFFICULT HAVE THESE PROBLEMS MADE IT FOR YOU TO DO YOUR WORK, TAKE CARE OF THINGS AT HOME, OR GET ALONG WITH OTHER PEOPLE: 0
6. FEELING BAD ABOUT YOURSELF - OR THAT YOU ARE A FAILURE OR HAVE LET YOURSELF OR YOUR FAMILY DOWN: 1
1. LITTLE INTEREST OR PLEASURE IN DOING THINGS: 3
3. TROUBLE FALLING OR STAYING ASLEEP: 1
7. TROUBLE CONCENTRATING ON THINGS, SUCH AS READING THE NEWSPAPER OR WATCHING TELEVISION: 0
SUM OF ALL RESPONSES TO PHQ QUESTIONS 1-9: 12
8. MOVING OR SPEAKING SO SLOWLY THAT OTHER PEOPLE COULD HAVE NOTICED. OR THE OPPOSITE, BEING SO FIGETY OR RESTLESS THAT YOU HAVE BEEN MOVING AROUND A LOT MORE THAN USUAL: 0
SUM OF ALL RESPONSES TO PHQ QUESTIONS 1-9: 12
SUM OF ALL RESPONSES TO PHQ9 QUESTIONS 1 & 2: 6
4. FEELING TIRED OR HAVING LITTLE ENERGY: 3

## 2022-08-30 ASSESSMENT — ENCOUNTER SYMPTOMS
WHEEZING: 0
NAUSEA: 0
DIARRHEA: 0
SORE THROAT: 0
CHEST TIGHTNESS: 0
ABDOMINAL PAIN: 0
COLOR CHANGE: 0
EYE REDNESS: 0
VOMITING: 0
SINUS PRESSURE: 0
RHINORRHEA: 0
BLOOD IN STOOL: 0
BACK PAIN: 0
SHORTNESS OF BREATH: 0
COUGH: 0
CONSTIPATION: 0
EYE ITCHING: 0

## 2022-08-30 NOTE — ASSESSMENT & PLAN NOTE
The risks related to smoking were reviewed with the patient. Recommend maintaining a smoke-free lifestyle. Products available for smoking cessation were discussed in detail.   chantix ordered  CT chest pending

## 2022-08-30 NOTE — PROGRESS NOTES
Jason Yao (:  1955) is a 75 y.o. male,Established patient, here for evaluation of the following chief complaint(s):  3 Month Follow-Up      ASSESSMENT/PLAN:  1. Type 2 diabetes mellitus without complication, unspecified whether long term insulin use (HCC)  Assessment & Plan:   Stable, controlled  No changes  Continue current treatment plan   a1c 6.5  Orders:  -     POCT glycosylated hemoglobin (Hb A1C)  2. Essential hypertension  Assessment & Plan:   Stable, controlled  No changes  Continue current treatment plan   Work on tobacco cessation    3. Moderate episode of recurrent major depressive disorder ([facility] [address]  Assessment & Plan:   Stable, controlled  No changes  Continue current treatment plan     4. Tobacco abuse  Assessment & Plan:   The risks related to smoking were reviewed with the patient. Recommend maintaining a smoke-free lifestyle. Products available for smoking cessation were discussed in detail. chantix ordered  CT chest pending      No follow-ups on file. SUBJECTIVE/OBJECTIVE:  Patient the office today for 3-month follow-up of chronic medical conditions. Overall he is doing well. He has been taking his medications as prescribed without any issues. He continues his Valium for anxiety and this is working effectively. Blood sugars remain stable and he continues to take his insulin as prescribed. His blood pressures are stable and he is doing well on lisinopril. He like to work on quitting smoking. He has used Chantix in the past and was effective however when it went off the market he went back to smoking. His wife is now also working to quit smoking today would like to do this together. His blood pressure is a little elevated today but did mention he smoked as soon as he arrived to the parking lot prior to his appointment.     Current Outpatient Medications   Medication Sig Dispense Refill    Cholecalciferol (VITAMIN D) 50 MCG ( UT) CAPS capsule Take by mouth      varenicline (CHANTIX) 0.5 MG tablet Take 1-2 tablets by mouth See Admin Instructions 0.5mg DAILY for 3 days followed by 0.5mg TWICE DAILY for 4 days followed by 1mg TWICE DAILY 57 tablet 0    varenicline (CHANTIX CONTINUING MONTH SKY) 1 MG tablet Take 1 tablet by mouth 2 times daily 60 tablet 3    diazePAM (VALIUM) 10 MG tablet TAKE 1 TABLET BY MOUTH ONCE DAILY AS NEEDED FOR ANXIETY 28 tablet 0    ibuprofen (ADVIL;MOTRIN) 600 MG tablet TAKE 1 TABLET BY MOUTH EVERY 6 HOURS WITH FOOD AS NEEDED FOR PAIN 120 tablet 0    FLUoxetine (PROZAC) 10 MG capsule Take 1 capsule by mouth once daily 90 capsule 0    glipiZIDE (GLUCOTROL XL) 10 MG extended release tablet Take 1 tablet by mouth once daily 90 tablet 0    LANTUS SOLOSTAR 100 UNIT/ML injection pen INJECT 35 UNITS SUBCUTANEOUSLY NIGHTLY 15 mL 0    metFORMIN (GLUCOPHAGE-XR) 500 mg extended release tablet TAKE 4 TABLETS BY MOUTH ONCE DAILY WITH BREAKFAST 360 tablet 0    lisinopril (PRINIVIL;ZESTRIL) 20 MG tablet Take 1 tablet by mouth once daily 90 tablet 1    atorvastatin (LIPITOR) 40 MG tablet Take 1 tablet by mouth once daily 90 tablet 1    Probiotic Product (PROBIOTIC-10 PO) Take by mouth      FLUoxetine (PROZAC) 20 MG capsule Take 1 capsule by mouth once daily 90 capsule 3    vardenafil (LEVITRA) 20 MG tablet TAKE ONE TABLET BY MOUTH AS NEEDED FOR  ERECTILE  DYSFUNCTION 10 tablet 0    NOVOFINE 32G X 6 MM MISC USE WITH INSULIN AT BEDTIME 100 each 3    Loperamide HCl (IMODIUM A-D PO) Take 2 tablets by mouth daily       aspirin 81 MG tablet Take 81 mg by mouth daily. Multiple Vitamin (MULTIVITAMIN PO) Take  by mouth daily. clotrimazole-betamethasone (LOTRISONE) 1-0.05 % cream Apply topically 2 times daily. (Patient not taking: Reported on 8/30/2022) 15 g 1    glucose blood VI test strips (ASCENSIA AUTODISC VI;ONE TOUCH ULTRA TEST VI) strip 1 each by In Vitro route daily Patient uses One Touch Ultra 2 meter. Patient needs test strips for meter.   Testing 2-3 times daily per E11.9 (Patient not taking: Reported on 8/30/2022) 100 each 3    Lancets MISC Patient uses One Touch Ultra 2 meter. Testing 2 -3 times daily per E11.9 (Patient not taking: Reported on 8/30/2022) 100 each 3    Glucose Blood (BLOOD GLUCOSE TEST STRIPS) STRP Patient is to test twice daily or as directed by physician. (Patient not taking: Reported on 8/30/2022) 100 strip 3    Blood Glucose Monitoring Suppl JENNIFER Patient to test twice daily or advised by doctor (Patient not taking: Reported on 8/30/2022) 1 Device 0    Accu-Chek Multiclix Lancets MISC USE ONE LANCET TO CHECK TWICE DAILY (Patient not taking: Reported on 8/30/2022) 100 each 3    ACCU-CHEK GEN PLUS strip USE ONE STRIP TO CHECK TWICE DAILY (Patient not taking: Reported on 8/30/2022) 100 each 3     No current facility-administered medications for this visit. Review of Systems   Constitutional:  Negative for chills, fatigue and fever. HENT:  Negative for congestion, ear pain, postnasal drip, rhinorrhea, sinus pressure, sneezing and sore throat. Eyes:  Negative for redness and itching. Respiratory:  Negative for cough, chest tightness, shortness of breath and wheezing. Cardiovascular:  Negative for chest pain and palpitations. Gastrointestinal:  Negative for abdominal pain, blood in stool, constipation, diarrhea, nausea and vomiting. Endocrine: Negative for cold intolerance and heat intolerance. Genitourinary:  Negative for difficulty urinating, dysuria, flank pain, frequency, hematuria and urgency. Musculoskeletal:  Negative for arthralgias, back pain, joint swelling and myalgias. Skin:  Negative for color change, pallor, rash and wound. Allergic/Immunologic: Negative for environmental allergies and food allergies. Neurological:  Negative for dizziness, seizures, syncope, weakness, light-headedness, numbness and headaches. Hematological:  Negative for adenopathy. Does not bruise/bleed easily.    Psychiatric/Behavioral:  Negative for confusion, sleep disturbance and suicidal ideas. The patient is not nervous/anxious and is not hyperactive. Vitals:    08/30/22 1318   BP: (!) 150/80   Site: Left Upper Arm   Position: Sitting   Cuff Size: Large Adult   Pulse: 88   Temp: 97.3 °F (36.3 °C)   SpO2: 96%   Weight: 203 lb (92.1 kg)   Height: 6' (1.829 m)       Physical Exam  Constitutional:       Appearance: Normal appearance. He is well-developed. HENT:      Head: Normocephalic and atraumatic. Right Ear: Hearing normal.      Left Ear: Hearing normal.      Nose: No mucosal edema. Right Sinus: No maxillary sinus tenderness or frontal sinus tenderness. Left Sinus: No maxillary sinus tenderness or frontal sinus tenderness. Mouth/Throat: Tonsils: No tonsillar abscesses. Eyes:      Extraocular Movements: Extraocular movements intact. Pupils: Pupils are equal, round, and reactive to light. Cardiovascular:      Rate and Rhythm: Normal rate and regular rhythm. Pulses: Normal pulses. Heart sounds: Normal heart sounds. Pulmonary:      Effort: Pulmonary effort is normal.      Breath sounds: Normal breath sounds. Lymphadenopathy:      Head:      Right side of head: No submental, submandibular, tonsillar, preauricular, posterior auricular or occipital adenopathy. Left side of head: No submental, submandibular, tonsillar, preauricular, posterior auricular or occipital adenopathy. Skin:     General: Skin is warm and dry. Neurological:      Mental Status: He is alert. Psychiatric:         Mood and Affect: Mood normal.         Behavior: Behavior normal.         On this date 8/30/2022 I have spent 30 minutes reviewing previous notes, test results and face to face with the patient discussing the diagnosis and importance of compliance with the treatment plan as well as documenting on the day of the visit. An electronic signature was used to authenticate this note.     --LUCY Oneal - CNP

## 2022-08-31 ENCOUNTER — TELEPHONE (OUTPATIENT)
Dept: FAMILY MEDICINE CLINIC | Age: 67
End: 2022-08-31

## 2022-08-31 DIAGNOSIS — E11.9 TYPE 2 DIABETES MELLITUS WITHOUT COMPLICATION, UNSPECIFIED WHETHER LONG TERM INSULIN USE (HCC): ICD-10-CM

## 2022-08-31 DIAGNOSIS — F41.9 ANXIETY: ICD-10-CM

## 2022-08-31 RX ORDER — DIAZEPAM 10 MG/1
TABLET ORAL
Qty: 28 TABLET | Refills: 0 | Status: SHIPPED | OUTPATIENT
Start: 2022-08-31 | End: 2022-10-03

## 2022-08-31 RX ORDER — METFORMIN HYDROCHLORIDE 500 MG/1
TABLET, EXTENDED RELEASE ORAL
Qty: 360 TABLET | Refills: 0 | Status: SHIPPED | OUTPATIENT
Start: 2022-08-31

## 2022-08-31 NOTE — TELEPHONE ENCOUNTER
Pt will run out of his diazePAM (VALIUM) 10 MG tablet Monday  Can he get a refill sent since the office will be closed?     He also needs a refill of metFORMIN (GLUCOPHAGE-XR) 500 mg extended release tablet        [facility] [address] [place] [address] [phone] - F [phone]   [address] [place] [address]   Phone:  [phone]  Fax:  [phone]

## 2022-09-06 RX ORDER — IBUPROFEN 600 MG/1
TABLET ORAL
Qty: 120 TABLET | Refills: 0 | Status: SHIPPED | OUTPATIENT
Start: 2022-09-06

## 2022-09-14 RX ORDER — ATORVASTATIN CALCIUM 40 MG/1
TABLET, FILM COATED ORAL
Qty: 90 TABLET | Refills: 0 | Status: SHIPPED | OUTPATIENT
Start: 2022-09-14

## 2022-10-03 DIAGNOSIS — F41.9 ANXIETY: ICD-10-CM

## 2022-10-03 RX ORDER — DIAZEPAM 10 MG/1
TABLET ORAL
Qty: 28 TABLET | Refills: 3 | Status: SHIPPED | OUTPATIENT
Start: 2022-10-03 | End: 2022-11-02

## 2022-10-03 RX ORDER — VARENICLINE TARTRATE 25 MG
KIT ORAL
Qty: 53 EACH | Refills: 0 | Status: SHIPPED | OUTPATIENT
Start: 2022-10-03

## 2022-10-10 RX ORDER — GLIPIZIDE 10 MG/1
TABLET, FILM COATED, EXTENDED RELEASE ORAL
Qty: 90 TABLET | Refills: 0 | Status: SHIPPED | OUTPATIENT
Start: 2022-10-10

## 2022-10-20 RX ORDER — FLUOXETINE 10 MG/1
CAPSULE ORAL
Qty: 90 CAPSULE | Refills: 0 | Status: SHIPPED | OUTPATIENT
Start: 2022-10-20

## 2022-10-23 RX ORDER — LISINOPRIL 20 MG/1
TABLET ORAL
Qty: 90 TABLET | Refills: 0 | Status: SHIPPED | OUTPATIENT
Start: 2022-10-23

## 2022-11-09 DIAGNOSIS — E11.9 TYPE 2 DIABETES MELLITUS WITHOUT COMPLICATION, UNSPECIFIED WHETHER LONG TERM INSULIN USE (HCC): ICD-10-CM

## 2022-11-09 RX ORDER — METFORMIN HYDROCHLORIDE 500 MG/1
TABLET, EXTENDED RELEASE ORAL
Qty: 360 TABLET | Refills: 0 | Status: SHIPPED | OUTPATIENT
Start: 2022-11-09

## 2022-11-09 RX ORDER — IBUPROFEN 600 MG/1
TABLET ORAL
Qty: 120 TABLET | Refills: 0 | Status: SHIPPED | OUTPATIENT
Start: 2022-11-09

## 2022-12-02 RX ORDER — FLUOXETINE HYDROCHLORIDE 20 MG/1
CAPSULE ORAL
Qty: 90 CAPSULE | Refills: 0 | Status: SHIPPED | OUTPATIENT
Start: 2022-12-02

## 2022-12-08 RX ORDER — SILDENAFIL 100 MG/1
100 TABLET, FILM COATED ORAL PRN
Qty: 6 TABLET | Refills: 0 | Status: SHIPPED | OUTPATIENT
Start: 2022-12-08 | End: 2022-12-18

## 2022-12-19 RX ORDER — ATORVASTATIN CALCIUM 40 MG/1
TABLET, FILM COATED ORAL
Qty: 90 TABLET | Refills: 0 | Status: SHIPPED | OUTPATIENT
Start: 2022-12-19

## 2022-12-23 RX ORDER — IBUPROFEN 600 MG/1
TABLET ORAL
Qty: 120 TABLET | Refills: 0 | Status: SHIPPED | OUTPATIENT
Start: 2022-12-23

## 2023-01-09 RX ORDER — GLIPIZIDE 10 MG/1
TABLET, FILM COATED, EXTENDED RELEASE ORAL
Qty: 90 TABLET | Refills: 0 | Status: SHIPPED | OUTPATIENT
Start: 2023-01-09

## 2023-01-12 DIAGNOSIS — E11.9 TYPE 2 DIABETES MELLITUS WITHOUT COMPLICATION, UNSPECIFIED WHETHER LONG TERM INSULIN USE (HCC): ICD-10-CM

## 2023-01-16 DIAGNOSIS — E11.9 TYPE 2 DIABETES MELLITUS WITHOUT COMPLICATION, UNSPECIFIED WHETHER LONG TERM INSULIN USE (HCC): ICD-10-CM

## 2023-01-16 RX ORDER — INSULIN GLARGINE 100 [IU]/ML
INJECTION, SOLUTION SUBCUTANEOUS
Qty: 15 ML | Refills: 0 | Status: SHIPPED | OUTPATIENT
Start: 2023-01-16

## 2023-01-16 NOTE — TELEPHONE ENCOUNTER
Requested Prescriptions     Pending Prescriptions Disp Refills    insulin glargine (LANTUS SOLOSTAR) 100 UNIT/ML injection pen 15 mL 0   NOV-2/28/23

## 2023-01-17 RX ORDER — FLUOXETINE 10 MG/1
CAPSULE ORAL
Qty: 90 CAPSULE | Refills: 0 | Status: SHIPPED | OUTPATIENT
Start: 2023-01-17

## 2023-01-18 RX ORDER — LISINOPRIL 20 MG/1
TABLET ORAL
Qty: 90 TABLET | Refills: 0 | Status: SHIPPED | OUTPATIENT
Start: 2023-01-18

## 2023-01-22 DIAGNOSIS — F41.9 ANXIETY: ICD-10-CM

## 2023-01-23 RX ORDER — DIAZEPAM 10 MG/1
TABLET ORAL
Qty: 28 TABLET | Refills: 0 | Status: SHIPPED | OUTPATIENT
Start: 2023-01-23 | End: 2023-02-20

## 2023-02-01 RX ORDER — IBUPROFEN 600 MG/1
TABLET ORAL
Qty: 120 TABLET | Refills: 0 | Status: SHIPPED | OUTPATIENT
Start: 2023-02-01

## 2023-02-11 DIAGNOSIS — E11.9 TYPE 2 DIABETES MELLITUS WITHOUT COMPLICATION, UNSPECIFIED WHETHER LONG TERM INSULIN USE (HCC): ICD-10-CM

## 2023-02-13 RX ORDER — METFORMIN HYDROCHLORIDE 500 MG/1
TABLET, EXTENDED RELEASE ORAL
Qty: 360 TABLET | Refills: 0 | Status: SHIPPED | OUTPATIENT
Start: 2023-02-13

## 2023-02-15 ENCOUNTER — OFFICE VISIT (OUTPATIENT)
Dept: FAMILY MEDICINE CLINIC | Age: 68
End: 2023-02-15

## 2023-02-15 VITALS
WEIGHT: 205 LBS | TEMPERATURE: 97.1 F | OXYGEN SATURATION: 97 % | DIASTOLIC BLOOD PRESSURE: 76 MMHG | HEIGHT: 72 IN | HEART RATE: 73 BPM | SYSTOLIC BLOOD PRESSURE: 132 MMHG | BODY MASS INDEX: 27.77 KG/M2

## 2023-02-15 DIAGNOSIS — Z23 NEED FOR PNEUMOCOCCAL VACCINATION: ICD-10-CM

## 2023-02-15 DIAGNOSIS — F33.1 MODERATE EPISODE OF RECURRENT MAJOR DEPRESSIVE DISORDER (HCC): ICD-10-CM

## 2023-02-15 DIAGNOSIS — Z79.4 TYPE 2 DIABETES MELLITUS WITH OTHER SPECIFIED COMPLICATION, WITH LONG-TERM CURRENT USE OF INSULIN (HCC): ICD-10-CM

## 2023-02-15 DIAGNOSIS — Z00.00 ENCOUNTER FOR ANNUAL WELLNESS VISIT (AWV) IN MEDICARE PATIENT: ICD-10-CM

## 2023-02-15 DIAGNOSIS — Z00.00 MEDICARE ANNUAL WELLNESS VISIT, SUBSEQUENT: ICD-10-CM

## 2023-02-15 DIAGNOSIS — Z87.891 PERSONAL HISTORY OF TOBACCO USE: ICD-10-CM

## 2023-02-15 DIAGNOSIS — E11.9 TYPE 2 DIABETES MELLITUS WITHOUT COMPLICATION, UNSPECIFIED WHETHER LONG TERM INSULIN USE (HCC): Primary | ICD-10-CM

## 2023-02-15 DIAGNOSIS — E11.69 TYPE 2 DIABETES MELLITUS WITH OTHER SPECIFIED COMPLICATION, WITH LONG-TERM CURRENT USE OF INSULIN (HCC): ICD-10-CM

## 2023-02-15 DIAGNOSIS — Z12.11 COLON CANCER SCREENING: ICD-10-CM

## 2023-02-15 DIAGNOSIS — E11.9 TYPE 2 DIABETES MELLITUS WITHOUT COMPLICATION, UNSPECIFIED WHETHER LONG TERM INSULIN USE (HCC): ICD-10-CM

## 2023-02-15 LAB
A/G RATIO: 1.5 (ref 1.1–2.2)
ALBUMIN SERPL-MCNC: 4.6 G/DL (ref 3.4–5)
ALP BLD-CCNC: 90 U/L (ref 40–129)
ALT SERPL-CCNC: 35 U/L (ref 10–40)
ANION GAP SERPL CALCULATED.3IONS-SCNC: 16 MMOL/L (ref 3–16)
AST SERPL-CCNC: 30 U/L (ref 15–37)
BILIRUB SERPL-MCNC: 0.7 MG/DL (ref 0–1)
BUN BLDV-MCNC: 8 MG/DL (ref 7–20)
CALCIUM SERPL-MCNC: 10 MG/DL (ref 8.3–10.6)
CHLORIDE BLD-SCNC: 99 MMOL/L (ref 99–110)
CHOLESTEROL, FASTING: 118 MG/DL (ref 0–199)
CO2: 24 MMOL/L (ref 21–32)
CREAT SERPL-MCNC: 0.7 MG/DL (ref 0.8–1.3)
CREATININE URINE: 157.8 MG/DL (ref 39–259)
GFR SERPL CREATININE-BSD FRML MDRD: >60 ML/MIN/{1.73_M2}
GLUCOSE BLD-MCNC: 82 MG/DL (ref 70–99)
HBA1C MFR BLD: 6.8 %
HCT VFR BLD CALC: 49.3 % (ref 40.5–52.5)
HDLC SERPL-MCNC: 43 MG/DL (ref 40–60)
HEMOGLOBIN: 17.2 G/DL (ref 13.5–17.5)
LDL CHOLESTEROL CALCULATED: 23 MG/DL
MCH RBC QN AUTO: 32 PG (ref 26–34)
MCHC RBC AUTO-ENTMCNC: 35 G/DL (ref 31–36)
MCV RBC AUTO: 91.7 FL (ref 80–100)
MICROALBUMIN UR-MCNC: 59.6 MG/DL
MICROALBUMIN/CREAT UR-RTO: 377.7 MG/G (ref 0–30)
PDW BLD-RTO: 13.3 % (ref 12.4–15.4)
PLATELET # BLD: 302 K/UL (ref 135–450)
PMV BLD AUTO: 7.7 FL (ref 5–10.5)
POTASSIUM SERPL-SCNC: 4.2 MMOL/L (ref 3.5–5.1)
RBC # BLD: 5.37 M/UL (ref 4.2–5.9)
SODIUM BLD-SCNC: 139 MMOL/L (ref 136–145)
TOTAL PROTEIN: 7.7 G/DL (ref 6.4–8.2)
TRIGLYCERIDE, FASTING: 258 MG/DL (ref 0–150)
VLDLC SERPL CALC-MCNC: 52 MG/DL
WBC # BLD: 10.9 K/UL (ref 4–11)

## 2023-02-15 ASSESSMENT — PATIENT HEALTH QUESTIONNAIRE - PHQ9
5. POOR APPETITE OR OVEREATING: 0
SUM OF ALL RESPONSES TO PHQ QUESTIONS 1-9: 3
1. LITTLE INTEREST OR PLEASURE IN DOING THINGS: 0
3. TROUBLE FALLING OR STAYING ASLEEP: 1
SUM OF ALL RESPONSES TO PHQ9 QUESTIONS 1 & 2: 1
4. FEELING TIRED OR HAVING LITTLE ENERGY: 1
SUM OF ALL RESPONSES TO PHQ QUESTIONS 1-9: 3
7. TROUBLE CONCENTRATING ON THINGS, SUCH AS READING THE NEWSPAPER OR WATCHING TELEVISION: 0
SUM OF ALL RESPONSES TO PHQ QUESTIONS 1-9: 3
2. FEELING DOWN, DEPRESSED OR HOPELESS: 1
8. MOVING OR SPEAKING SO SLOWLY THAT OTHER PEOPLE COULD HAVE NOTICED. OR THE OPPOSITE, BEING SO FIGETY OR RESTLESS THAT YOU HAVE BEEN MOVING AROUND A LOT MORE THAN USUAL: 0
10. IF YOU CHECKED OFF ANY PROBLEMS, HOW DIFFICULT HAVE THESE PROBLEMS MADE IT FOR YOU TO DO YOUR WORK, TAKE CARE OF THINGS AT HOME, OR GET ALONG WITH OTHER PEOPLE: 1
6. FEELING BAD ABOUT YOURSELF - OR THAT YOU ARE A FAILURE OR HAVE LET YOURSELF OR YOUR FAMILY DOWN: 0
9. THOUGHTS THAT YOU WOULD BE BETTER OFF DEAD, OR OF HURTING YOURSELF: 0
SUM OF ALL RESPONSES TO PHQ QUESTIONS 1-9: 3

## 2023-02-15 ASSESSMENT — LIFESTYLE VARIABLES
HOW MANY STANDARD DRINKS CONTAINING ALCOHOL DO YOU HAVE ON A TYPICAL DAY: 1 OR 2
HOW OFTEN DO YOU HAVE A DRINK CONTAINING ALCOHOL: MONTHLY OR LESS

## 2023-02-15 NOTE — PATIENT INSTRUCTIONS
Learning About Lung Cancer Screening  What is screening for lung cancer? Lung cancer screening is a way to find some lung cancers early, before a person has any symptoms of the cancer. Lung cancer screening may help those who have the highest risk for lung cancer--people age 48 and older who are or were heavy smokers. For most people, who aren't at increased risk, screening for lung cancer probably isn't helpful. Screening won't prevent cancer. And it may not find all lung cancers. Lung cancer screening may lower the risk of dying from lung cancer in a small number of people. How is it done? Lung cancer screening is done with a low-dose CT (computed tomography) scan. A CT scan uses X-rays, or radiation, to make detailed pictures of your body. Experts recommend that screening be done in medical centers that focus on finding and treating lung cancer. Who is screening recommended for? Lung cancer screening is recommended for people age 48 and older who are or were heavy smokers. That means people with a smoking history of at least 20 pack years. A pack year is a way to measure how heavy a smoker you are or were. To figure out your pack years, multiply how many packs a day on average (assuming 20 cigarettes per pack) you have smoked by how many years you have smoked. For example:  · If you smoked 1 pack a day for 20 years, that's 1 times 20. So you have a smoking history of 20 pack years. · If you smoked 2 packs a day for 10 years, that's 2 times 10. So you have a smoking history of 20 pack years. Experts agree that screening is for people who have a high risk of lung cancer. But experts don't agree on what high risk means. Some say people age 48 or older with at least a 20-pack-year smoking history are high risk. Others say it's people age 54 or older with a 30-pack-year history. To see if you could benefit from screening, first find out if you are at high risk for lung cancer.  Your doctor can help you decide your lung cancer risk. What are the risks of screening? CT screening for lung cancer isn't perfect. It can show an abnormal result when it turns out there wasn't any cancer. This is called a false-positive result. This means you may need more tests to make sure you don't have cancer. These tests can be harmful and cause a lot of worry. These tests may include more CT scans and invasive testing like a lung biopsy. In a biopsy, the doctor takes a sample of tissue from inside your lung so it can be looked at under a microscope. A biopsy is the only way to tell if you have lung cancer. If the biopsy finds cancer, you and your doctor will have to decide how or whether to treat it. Some lung cancers found on CT scans are harmless and would not have caused a problem if they had not been found through screening. But because doctors can't tell which ones will turn out to be harmless, most will be treated. This means that you may get treatment--including surgery, radiation, or chemotherapy--that you don't need. There is a risk of damage to cells or tissue from being exposed to radiation, including the small amounts used in CTs, X-rays, and other medical tests. Over time, exposure to radiation may cause cancer and other health problems. But in most cases, the risk of getting cancer from being exposed to small amounts of radiation is low. It's not a reason to avoid these tests for most people. What are the benefits of screening? Your scan may be normal (negative). For some people who are at higher risk, screening lowers the chance of dying of lung cancer. How much and how long you smoked helps to determine your risk level. Screening can find some cancers early, when treatment may be more likely to work. What happens after screening? The results of your CT scan will be sent to your doctor. Someone from your care team will explain the results of your scan and answer any questions you may have.  If you need any follow-up, he or she will help you understand what to do next. After a lung cancer screening, you can go back to your usual activities right away. A lung cancer screening test can't tell if you have lung cancer. If your results are positive, your doctor can't tell whether an abnormal finding is a harmless nodule, cancer, or something else without doing more tests. What can you do to help prevent lung cancer? Some lung cancers can't be prevented. But if you smoke, quitting smoking is the best step you can take to prevent lung cancer. If you want to quit, your doctor can recommend medicines or other ways to help. Follow-up care is a key part of your treatment and safety. Be sure to make and go to all appointments, and call your doctor if you are having problems. It's also a good idea to know your test results and keep a list of the medicines you take. Where can you learn more? Go to http://www.basurto.com/ and enter Q940 to learn more about \"Learning About Lung Cancer Screening. \"  Current as of: May 4, 2022               Content Version: 13.5  © 6124-1413 [employer]. Care instructions adapted under license by [facility] ([facility]). If you have questions about a medical condition or this instruction, always ask your healthcare professional. [address] any warranty or liability for your use of this information. Learning About Being Active as an Older Adult  Why is being active important as you get older? Being active is one of the best things you can do for your health. And it's never too late to start. Being active--or getting active, if you aren't already--has definite benefits. It can:  Give you more energy,  Keep your mind sharp. Improve balance to reduce your risk of falls. Help you manage chronic illness with fewer medicines.   No matter how old you are, how fit you are, or what health problems you have, there is a form of activity that will work for you. And the more physical activity you can do, the better your overall health will be. What kinds of activity can help you stay healthy? Being more active will make your daily activities easier. Physical activity includes planned exercise and things you do in daily life. There are four types of activity:  Aerobic. Doing aerobic activity makes your heart and lungs strong. Includes walking, dancing, and gardening. Aim for at least 2½ hours spread throughout the week. It improves your energy and can help you sleep better. Muscle-strengthening. This type of activity can help maintain muscle and strengthen bones. Includes climbing stairs, using resistance bands, and lifting or carrying heavy loads. Aim for at least twice a week. It can help protect the knees and other joints. Stretching. Stretching gives you better range of motion in joints and muscles. Includes upper arm stretches, calf stretches, and gentle yoga. Aim for at least twice a week, preferably after your muscles are warmed up from other activities. It can help you function better in daily life. Balancing. This helps you stay coordinated and have good posture. Includes heel-to-toe walking, nasir chi, and certain types of yoga. Aim for at least 3 days a week. It can reduce your risk of falling. Even if you have a hard time meeting the recommendations, it's better to be more active than less active. All activity done in each category counts toward your weekly total. You'd be surprised how daily things like carrying groceries, keeping up with grandchildren, and taking the stairs can add up. What keeps you from being active? If you've had a hard time being more active, you're not alone. Maybe you remember being able to do more. Or maybe you've never thought of yourself as being active. It's frustrating when you can't do the things you want. Being more active can help. What's holding you back? Getting started.   Have a goal, but break it into easy tasks. Small steps build into big accomplishments. Staying motivated. If you feel like skipping your activity, remember your goal. Maybe you want to move better and stay independent. Every activity gets you one step closer. Not feeling your best.  Start with 5 minutes of an activity you enjoy. Prove to yourself you can do it. As you get comfortable, increase your time. You may not be where you want to be. But you're in the process of getting there. Everyone starts somewhere. How can you find safe ways to stay active? Talk with your doctor about any physical challenges you're facing. Make a plan with your doctor if you have a health problem or aren't sure how to get started with activity. If you're already active, ask your doctor if there is anything you should change to stay safe as your body and health change. If you tend to feel dizzy after you take medicine, avoid activity at that time. Try being active before you take your medicine. This will reduce your risk of falls. If you plan to be active at home, make sure to clear your space before you get started. Remove things like TV cords, coffee tables, and throw rugs. It's safest to have plenty of space to move freely. The key to getting more active is to take it slow and steady. Try to improve only a little bit at a time. Pick just one area to improve on at first. And if an activity hurts, stop and talk to your doctor. Where can you learn more? Go to http://www.basurto.com/ and enter P600 to learn more about \"Learning About Being Active as an Older Adult. \"  Current as of: October 10, 2022               Content Version: 13.5  © 5568-5497 [employer]. Care instructions adapted under license by [facility] ([facility]). If you have questions about a medical condition or this instruction, always ask your healthcare professional. [address] any warranty or liability for your use of this information. Advance Directives: Care Instructions  Overview  An advance directive is a legal way to state your wishes at the end of your life. It tells your family and your doctor what to do if you can't say what you want. There are two main types of advance directives. You can change them any time your wishes change. Living will. This form tells your family and your doctor your wishes about life support and other treatment. The form is also called a declaration. Medical power of . This form lets you name a person to make treatment decisions for you when you can't speak for yourself. This person is called a health care agent (health care proxy, health care surrogate). The form is also called a durable power of  for health care. If you do not have an advance directive, decisions about your medical care may be made by a family member, or by a doctor or a  who doesn't know you. It may help to think of an advance directive as a gift to the people who care for you. If you have one, they won't have to make tough decisions by themselves. For more information, including forms for your state, see the [address] website (www.caringinfo.org/planning/advance-directives/). Follow-up care is a key part of your treatment and safety. Be sure to make and go to all appointments, and call your doctor if you are having problems. It's also a good idea to know your test results and keep a list of the medicines you take. What should you include in an advance directive? Many states have a unique advance directive form. (It may ask you to address specific issues.) Or you might use a universal form that's approved by many states. If your form doesn't tell you what to address, it may be hard to know what to include in your advance directive. Use the questions below to help you get started. Who do you want to make decisions about your medical care if you are not able to?   What life-support measures do you want if you have a serious illness that gets worse over time or can't be cured? What are you most afraid of that might happen? (Maybe you're afraid of having pain, losing your independence, or being kept alive by machines.)  Where would you prefer to die? (Your home? A hospital? A nursing home?)  Do you want to donate your organs when you die? Do you want certain Sikhism practices performed before you die? When should you call for help? Be sure to contact your doctor if you have any questions. Where can you learn more? Go to http://www.basurto.com/ and enter R264 to learn more about \"Advance Directives: Care Instructions. \"  Current as of: June 16, 2022               Content Version: 13.5  © 2006-2022 "[employer]. Care instructions adapted under license by [facility] [place] ([facility]). If you have questions about a medical condition or this instruction, always ask your healthcare professional. [place] [address] any warranty or liability for your use of this information. A Healthy Heart: Care Instructions  Your Care Instructions     Coronary artery disease, also called heart disease, occurs when a substance called plaque builds up in the vessels that supply oxygen-rich blood to your heart muscle. This can narrow the blood vessels and reduce blood flow. A heart attack happens when blood flow is completely blocked. A high-fat diet, smoking, and other factors increase the risk of heart disease. Your doctor has found that you have a chance of having heart disease. You can do lots of things to keep your heart healthy. It may not be easy, but you can change your diet, exercise more, and quit smoking. These steps really work to lower your chance of heart disease. Follow-up care is a key part of your treatment and safety. Be sure to make and go to all appointments, and call your doctor if you are having problems.  It's also a good idea to know your test results and keep a list of the medicines you take. How can you care for yourself at home? Diet    Use less salt when you cook and eat. This helps lower your blood pressure. Taste food before salting. Add only a little salt when you think you need it. With time, your taste buds will adjust to less salt.     Eat fewer snack items, fast foods, canned soups, and other high-salt, high-fat, processed foods.     Read food labels and try to avoid saturated and trans fats. They increase your risk of heart disease by raising cholesterol levels.     Limit the amount of solid fat-butter, margarine, and shortening-you eat. Use olive, peanut, or canola oil when you cook. Bake, broil, and steam foods instead of frying them.     Eat a variety of fruit and vegetables every day. Dark green, deep orange, red, or yellow fruits and vegetables are especially good for you. Examples include spinach, carrots, peaches, and berries.     Foods high in fiber can reduce your cholesterol and provide important vitamins and minerals. High-fiber foods include whole-grain cereals and breads, oatmeal, beans, brown rice, citrus fruits, and apples.     Eat lean proteins. Heart-healthy proteins include seafood, lean meats and poultry, eggs, beans, peas, nuts, seeds, and soy products.     Limit drinks and foods with added sugar. These include candy, desserts, and soda pop. Lifestyle changes    If your doctor recommends it, get more exercise. Walking is a good choice. Bit by bit, increase the amount you walk every day. Try for at least 30 minutes on most days of the week. You also may want to swim, bike, or do other activities.     Do not smoke. If you need help quitting, talk to your doctor about stop-smoking programs and medicines. These can increase your chances of quitting for good. Quitting smoking may be the most important step you can take to protect your heart. It is never too late to quit.     Limit alcohol to 2 drinks a day for men and 1 drink a day for women.  Too much alcohol can cause health problems.     Manage other health problems such as diabetes, high blood pressure, and high cholesterol. If you think you may have a problem with alcohol or drug use, talk to your doctor. Medicines    Take your medicines exactly as prescribed. Call your doctor if you think you are having a problem with your medicine.     If your doctor recommends aspirin, take the amount directed each day. Make sure you take aspirin and not another kind of pain reliever, such as acetaminophen (Tylenol). When should you call for help? Call 911 if you have symptoms of a heart attack. These may include:    Chest pain or pressure, or a strange feeling in the chest.     Sweating.     Shortness of breath.     Pain, pressure, or a strange feeling in the back, neck, jaw, or upper belly or in one or both shoulders or arms.     Lightheadedness or sudden weakness.     A fast or irregular heartbeat. After you call 911, the  may tell you to chew 1 adult-strength or 2 to 4 low-dose aspirin. Wait for an ambulance. Do not try to drive yourself. Watch closely for changes in your health, and be sure to contact your doctor if you have any problems. Where can you learn more? Go to http://www.basurto.com/ and enter F075 to learn more about \"A Healthy Heart: Care Instructions. \"  Current as of: September 7, 2022               Content Version: 13.5  © 3184-4769 [employer]. Care instructions adapted under license by [employer]. If you have questions about a medical condition or this instruction, always ask your healthcare professional. [place] [address] any warranty or liability for your use of this information. Personalized Preventive Plan for Neville Gaitan - 2/15/2023  Medicare offers a range of preventive health benefits. Some of the tests and screenings are paid in full while other may be subject to a deductible, co-insurance, and/or copay.     Some of these benefits include a comprehensive review of your medical history including lifestyle, illnesses that may run in your family, and various assessments and screenings as appropriate. After reviewing your medical record and screening and assessments performed today your provider may have ordered immunizations, labs, imaging, and/or referrals for you. A list of these orders (if applicable) as well as your Preventive Care list are included within your After Visit Summary for your review. Other Preventive Recommendations:    A preventive eye exam performed by an eye specialist is recommended every 1-2 years to screen for glaucoma; cataracts, macular degeneration, and other eye disorders. A preventive dental visit is recommended every 6 months. Try to get at least 150 minutes of exercise per week or 10,000 steps per day on a pedometer . Order or download the FREE \"Exercise & Physical Activity: Your Everyday Guide\" from [employer]. Call 5-664.669.2494 or search [employer] online. You need 6701-3033 mg of calcium and 0621-9018 IU of vitamin D per day. It is possible to meet your calcium requirement with diet alone, but a vitamin D supplement is usually necessary to meet this goal.  When exposed to the sun, use a sunscreen that protects against both UVA and UVB radiation with an SPF of 30 or greater. Reapply every 2 to 3 hours or after sweating, drying off with a towel, or swimming. Always wear a seat belt when traveling in a car. Always wear a helmet when riding a bicycle or motorcycle.

## 2023-02-15 NOTE — PROGRESS NOTES
An electronic signature was used to authenticate this note. --LUCY Curry - BROOKS   Discussed with the patient the current USPSTF guidelines released March 9, 2021 for screening for lung cancer. For adults aged 48 to [de-identified] years who have a 20 pack-year smoking history and currently smoke or have quit within the past 15 years the grade B recommendation is to:  Screen for lung cancer with low-dose computed tomography (LDCT) every year. Stop screening once a person has not smoked for 15 years or has a health problem that limits life expectancy or the ability to have lung surgery. The patient  reports that he has been smoking cigarettes. He has a 39.00 pack-year smoking history. He has never used smokeless tobacco.. Discussed with patient the risks and benefits of screening, including over-diagnosis, false positive rate, and total radiation exposure. The patient currently exhibits no signs or symptoms suggestive of lung cancer. Discussed with patient the importance of compliance with yearly annual lung cancer screenings and willingness to undergo diagnosis and treatment if screening scan is positive. In addition, the patient was counseled regarding the importance of remaining smoke free and/or total smoking cessation. Also reviewed the following if the patient has Medicare that as of February 10, 2022, Medicare only covers LDCT screening in patients aged 51-72 with at least a 20 pack-year smoking history who currently smoke or have quit in the last 15 yearsMedicare Annual Wellness Visit    Catherine Courtney is here for Medicare AW    Assessment & Plan   Type 2 diabetes mellitus without complication, unspecified whether long term insulin use ([address]  Assessment & Plan:   Stable, controlled  No changes  Continue current treatment plan     Orders:  -     POCT glycosylated hemoglobin (Hb A1C)  -     CBC; Future  -     Lipid, Fasting; Future  -     Comprehensive Metabolic Panel;  Future  -  MICROALBUMIN / CREATININE URINE RATIO; Future  Colon cancer screening  -     [facility] - Lavonne Ayala MD, Gastroenterology (EUS), [facility]  Moderate episode of recurrent major depressive disorder (HCC)  Assessment & Plan:   Stable, controlled  No changes  Continue current treatment plan     Type 2 diabetes mellitus with other specified complication, with long-term current use of insulin (HCC)  Assessment & Plan:   Stable, controlled  No changes  Continue current treatment plan     Personal history of tobacco use  -     TX VISIT TO DISCUSS LUNG CA SCREEN W LDCT  -     CT Lung Screen (Annual/Baseline); Future  Medicare annual wellness visit, subsequent  Encounter for annual wellness visit (AWV) in Medicare patient  Assessment & Plan:   awv completed  Labs ordered  Colonoscopy       Recommendations for Preventive Services Due: see orders and patient instructions/AVS.  Recommended screening schedule for the next 5-10 years is provided to the patient in written form: see Patient Instructions/AVS.     Return for Medicare Annual Wellness Visit in 1 year.     Subjective       Patient's complete Health Risk Assessment and screening values have been reviewed and are found in Flowsheets. The following problems were reviewed today and where indicated follow up appointments were made and/or referrals ordered.    Positive Risk Factor Screenings with Interventions:                 Weight and Activity:  Physical Activity: Inactive    Days of Exercise per Week: 0 days    Minutes of Exercise per Session: 0 min     On average, how many days per week do you engage in moderate to strenuous exercise (like a brisk walk)?: 0 days  Have you lost any weight without trying in the past 3 months?: No  Body mass index: (!) 27.8    Inactivity Interventions:  Patient declined any further interventions or treatment           Advanced Directives:  Do you have a Living Will?: (!) No    Intervention:  Encouraged to complete advance care  planning documentation        Tobacco Use:  Tobacco Use: High Risk    Smoking Tobacco Use: Every Day    Smokeless Tobacco Use: Never    Passive Exposure: Not on file     E-cigarette/Vaping       Questions Responses    E-cigarette/Vaping Use     Start Date     Passive Exposure     Quit Date     Counseling Given     Comments         Interventions:  Patient agrees to think about their triggers for tobacco use, why they should quit, and learn more about the harmful effects of tobacco  Patient agrees to start an exercise program to relieve stress and help avoid weight gain associated with tobacco cessation  Patient agrees to tell someone they are trying to quit smoking  Patient agrees to eliminate tobacco products from their home and work environments  Patient agrees to avoid triggers and negative influences          Objective   Vitals:    02/15/23 1202   BP: 132/76   Site: Left Upper Arm   Position: Sitting   Cuff Size: Medium Adult   Pulse: 73   Temp: 97.1 °F (36.2 °C)   SpO2: 97%   Weight: 205 lb (93 kg)   Height: 6' (1.829 m)      Body mass index is 27.8 kg/m².       General Appearance: alert and oriented to person, place and time, well developed and well- nourished, in no acute distress  Skin: warm and dry, no rash or erythema  Head: normocephalic and atraumatic  Eyes: pupils equal, round, and reactive to light, extraocular eye movements intact, conjunctivae normal  ENT: tympanic membrane, external ear and ear canal normal bilaterally, nose without deformity, nasal mucosa and turbinates normal without polyps  Neck: supple and non-tender without mass, no thyromegaly or thyroid nodules, no cervical lymphadenopathy  Pulmonary/Chest: clear to auscultation bilaterally- no wheezes, rales or rhonchi, normal air movement, no respiratory distress  Cardiovascular: normal rate, regular rhythm, normal S1 and S2, no murmurs, rubs, clicks, or gallops, distal pulses intact, no carotid bruits  Abdomen: soft, non-tender, non-distended, normal bowel sounds, no masses or organomegaly  Extremities: no cyanosis, clubbing or edema  Musculoskeletal: normal range of motion, no joint swelling, deformity or tenderness  Neurologic: reflexes normal and symmetric, no cranial nerve deficit, gait, coordination and speech normal       Allergies   Allergen Reactions    Pravastatin Sodium      Prior to Visit Medications    Medication Sig Taking?  Authorizing Provider   metFORMIN (GLUCOPHAGE-XR) 500 MG extended release tablet TAKE 4 TABLETS BY MOUTH ONCE DAILY WITH BREAKFAST Yes LUCY Perla CNP   ibuprofen (ADVIL;MOTRIN) 600 MG tablet TAKE 1 TABLET BY MOUTH EVERY 6 HOURS WITH FOOD AS NEEDED FOR PAIN Yes LUCY Perla CNP   diazePAM (VALIUM) 10 MG tablet TAKE 1 TABLET BY MOUTH ONCE DAILY AS NEEDED FOR ANXIETY Yes LUCY Gomez CNP   lisinopril (PRINIVIL;ZESTRIL) 20 MG tablet Take 1 tablet by mouth once daily Yes LUCY Winn CNP   FLUoxetine (PROZAC) 10 MG capsule Take 1 capsule by mouth once daily Yes LUCY Winn CNP   LANTUS SOLOSTAR 100 UNIT/ML injection pen INJECT 35 UNITS SUBCUTANEOUSLY NIGHTLY Yes LUCY Gomez CNP   insulin glargine (LANTUS SOLOSTAR) 100 UNIT/ML injection pen Inject 35 units nightly Yes LUCY Gomez CNP   glipiZIDE (GLUCOTROL XL) 10 MG extended release tablet Take 1 tablet by mouth once daily Yes LUCY Winn CNP   atorvastatin (LIPITOR) 40 MG tablet Take 1 tablet by mouth once daily Yes LUCY Winn CNP   FLUoxetine (PROZAC) 20 MG capsule Take 1 capsule by mouth once daily Yes LUCY Winn CNP   varenicline (CHANTIX SKY) 0.5 MG X 11 & 1 MG X 42 tablet TAKE AS DIRECTED Yes LUCY Dsouza CNP   Cholecalciferol (VITAMIN D) 50 MCG (2000 UT) CAPS capsule Take by mouth Yes Historical Provider, MD   varenicline (CHANTIX CONTINUING MONTH SKY) 1 MG tablet Take 1 tablet by mouth 2 times daily Yes LUCY Lockhart CNP   Probiotic Product (PROBIOTIC-10 PO) Take by mouth Yes Historical Provider, MD   vardenafil (LEVITRA) 20 MG tablet TAKE ONE TABLET BY MOUTH AS NEEDED FOR  ERECTILE  DYSFUNCTION Yes Juris Kussmaul, MD   NOVOFINE 32G X 6 MM MISC USE WITH INSULIN AT BEDTIME Yes Linda Bourne MD   Loperamide HCl (IMODIUM A-D PO) Take 2 tablets by mouth daily  Yes Historical Provider, MD   aspirin 81 MG tablet Take 81 mg by mouth daily. Yes Historical Provider, MD   Multiple Vitamin (MULTIVITAMIN PO) Take  by mouth daily. Yes Historical Provider, MD   clotrimazole-betamethasone (LOTRISONE) 1-0.05 % cream Apply topically 2 times daily.   Patient not taking: Reported on 8/30/2022  Juris Kussmaul, MD       [facility] (Including outside providers/suppliers regularly involved in providing care):   Patient Care Team:  LUCY Lockhart CNP as PCP - General (Nurse Practitioner)  LUCY Lockhart CNP as PCP - Empaneled Provider  Vinnie Lee RN as Nurse Navigator     Reviewed and updated this visit:  Allergies  Meds              LUCY Lockhart CNP

## 2023-02-18 DIAGNOSIS — F41.9 ANXIETY: ICD-10-CM

## 2023-02-20 RX ORDER — DIAZEPAM 10 MG/1
TABLET ORAL
Qty: 28 TABLET | Refills: 0 | Status: SHIPPED | OUTPATIENT
Start: 2023-02-20 | End: 2023-03-22

## 2023-03-06 RX ORDER — FLUOXETINE HYDROCHLORIDE 20 MG/1
CAPSULE ORAL
Qty: 90 CAPSULE | Refills: 0 | Status: SHIPPED | OUTPATIENT
Start: 2023-03-06

## 2023-03-13 RX ORDER — IBUPROFEN 600 MG/1
TABLET ORAL
Qty: 120 TABLET | Refills: 0 | Status: SHIPPED | OUTPATIENT
Start: 2023-03-13

## 2023-03-16 RX ORDER — ATORVASTATIN CALCIUM 40 MG/1
TABLET, FILM COATED ORAL
Qty: 90 TABLET | Refills: 0 | Status: SHIPPED | OUTPATIENT
Start: 2023-03-16

## 2023-03-19 DIAGNOSIS — F41.9 ANXIETY: ICD-10-CM

## 2023-03-20 RX ORDER — DIAZEPAM 10 MG/1
TABLET ORAL
Qty: 28 TABLET | Refills: 0 | Status: SHIPPED | OUTPATIENT
Start: 2023-03-20 | End: 2023-04-19

## 2023-04-06 RX ORDER — GLIPIZIDE 10 MG/1
TABLET, FILM COATED, EXTENDED RELEASE ORAL
Qty: 90 TABLET | Refills: 0 | Status: SHIPPED | OUTPATIENT
Start: 2023-04-06

## 2023-04-14 DIAGNOSIS — F41.9 ANXIETY: ICD-10-CM

## 2023-04-17 RX ORDER — DIAZEPAM 10 MG/1
TABLET ORAL
Qty: 28 TABLET | Refills: 3 | Status: SHIPPED | OUTPATIENT
Start: 2023-04-17 | End: 2023-05-17

## 2023-04-17 RX ORDER — IBUPROFEN 600 MG/1
TABLET ORAL
Qty: 120 TABLET | Refills: 0 | Status: SHIPPED | OUTPATIENT
Start: 2023-04-17

## 2023-04-20 RX ORDER — LISINOPRIL 20 MG/1
TABLET ORAL
Qty: 90 TABLET | Refills: 0 | Status: SHIPPED | OUTPATIENT
Start: 2023-04-20

## 2023-04-24 RX ORDER — FLUOXETINE 10 MG/1
CAPSULE ORAL
Qty: 90 CAPSULE | Refills: 0 | Status: SHIPPED | OUTPATIENT
Start: 2023-04-24

## 2023-05-02 ENCOUNTER — TELEPHONE (OUTPATIENT)
Dept: FAMILY MEDICINE CLINIC | Age: 68
End: 2023-05-02

## 2023-05-02 RX ORDER — FLASH GLUCOSE SENSOR
KIT MISCELLANEOUS
Qty: 1 EACH | Refills: 0 | Status: SHIPPED | OUTPATIENT
Start: 2023-05-02

## 2023-05-02 RX ORDER — FLASH GLUCOSE SENSOR
KIT MISCELLANEOUS
Qty: 3 EACH | Refills: 3 | Status: SHIPPED | OUTPATIENT
Start: 2023-05-02

## 2023-05-02 NOTE — TELEPHONE ENCOUNTER
Patient would like clinical to contact him; have several questions that needs answered today.  Thanks

## 2023-05-02 NOTE — TELEPHONE ENCOUNTER
Pt [facility] through his insurance is asking for a aquiles to be ordered      Pt is overdue for colonoscopy I advised pt to call Dr. Tram Bradford office and schedule at his earliest convenience at least with in 4 months. Pt is in agree ance and Dr. Tram Bradford number was given to him.

## 2023-05-08 ENCOUNTER — TELEPHONE (OUTPATIENT)
Dept: FAMILY MEDICINE CLINIC | Age: 68
End: 2023-05-08

## 2023-05-08 RX ORDER — VARENICLINE TARTRATE 1 MG/1
TABLET, FILM COATED ORAL
Qty: 60 TABLET | Refills: 0 | Status: SHIPPED | OUTPATIENT
Start: 2023-05-08

## 2023-05-08 NOTE — TELEPHONE ENCOUNTER
----- Message from Bob Joaquimjeff sent at 5/8/2023  9:10 AM EDT -----  Subject: Message to Provider    QUESTIONS  Information for Provider? patient is wanting to know if the referral for   Colonoscopy can be sent to his work address. He would like it sent to   [facility]. [email or web address]  ---------------------------------------------------------------------------  --------------  Radha Marquez [place]  [phone]; OK to leave message on voicemail  ---------------------------------------------------------------------------  --------------  SCRIPT ANSWERS  Relationship to Patient?  Self

## 2023-06-02 RX ORDER — SILDENAFIL 100 MG/1
100 TABLET, FILM COATED ORAL PRN
Qty: 6 TABLET | Refills: 5 | Status: SHIPPED | OUTPATIENT
Start: 2023-06-02 | End: 2023-06-12

## 2023-06-05 ENCOUNTER — TELEPHONE (OUTPATIENT)
Dept: FAMILY MEDICINE CLINIC | Age: 68
End: 2023-06-05

## 2023-06-05 RX ORDER — FLUOXETINE HYDROCHLORIDE 20 MG/1
CAPSULE ORAL
Qty: 90 CAPSULE | Refills: 0 | Status: SHIPPED | OUTPATIENT
Start: 2023-06-05

## 2023-06-05 NOTE — TELEPHONE ENCOUNTER
Patient says [employer]  is faxing a form which needs the CMN number filled in so that he can get his diabetic medication. PLEASE NOTIFY him when the paper has been received in the office.     [phone] (home)

## 2023-06-08 RX ORDER — IBUPROFEN 600 MG/1
TABLET ORAL
Qty: 120 TABLET | Refills: 0 | Status: SHIPPED | OUTPATIENT
Start: 2023-06-08

## 2023-06-27 RX ORDER — GLIPIZIDE 10 MG/1
TABLET, FILM COATED, EXTENDED RELEASE ORAL
Qty: 90 TABLET | Refills: 0 | Status: SHIPPED | OUTPATIENT
Start: 2023-06-27

## 2023-07-03 ENCOUNTER — TELEPHONE (OUTPATIENT)
Dept: CASE MANAGEMENT | Age: 68
End: 2023-07-03

## 2023-07-03 NOTE — TELEPHONE ENCOUNTER
Pt with active, unscheduled lung screening CT order. Letter sent to pt with information on lung screening and scheduling number, [phone].         Miguel MEDRANON, RN   Lung Nodule Navigator  [facility]  [phone]

## 2023-07-11 RX ORDER — IBUPROFEN 600 MG/1
TABLET ORAL
Qty: 120 TABLET | Refills: 0 | Status: SHIPPED | OUTPATIENT
Start: 2023-07-11

## 2023-07-11 RX ORDER — VARENICLINE TARTRATE 1 MG/1
TABLET, FILM COATED ORAL
Qty: 60 TABLET | Refills: 0 | Status: SHIPPED | OUTPATIENT
Start: 2023-07-11

## 2023-07-12 RX ORDER — LISINOPRIL 20 MG/1
TABLET ORAL
Qty: 90 TABLET | Refills: 0 | Status: SHIPPED | OUTPATIENT
Start: 2023-07-12

## 2023-07-12 NOTE — TELEPHONE ENCOUNTER
02/15/2023 LOV  08/15/2023 Scheduled Follow Up Visit    Requested Prescriptions     Pending Prescriptions Disp Refills    lisinopril (PRINIVIL;ZESTRIL) 20 MG tablet [Pharmacy Med Name: Lisinopril 20 MG Oral Tablet] 90 tablet 0     Sig: Take 1 tablet by mouth once daily

## 2023-07-13 NOTE — PROGRESS NOTES
ENDOSCOPY PREOP INSTRUCTIONS      You are scheduled for a procedure at [facility] on 7-14 @ 1000. You will need to arrival by: 830 (at least an hour & a half prior to planned start time)  Report to the MAIN entrance on [address] and register at the surgery center on the left-hand side of the lobby  You will need your insurance card and photo id and a list of all medications taken on a regular basis. Please include the dose/frequency. For your procedure:     PLEASE FOLLOW ALL INSTRUCTIONS & PREPS GIVEN TO YOU BY YOUR DOCTOR'S OFFICE. If you have not received these instructions yet, please call the office immediately. Make sure to read them as soon as received. If you are taking blood thinners, Aspirin or diabetic medication, make sure to call your doctor as soon as possible for instructions prior to your procedure. Please dress comfortably and do not wear any lotion, powders or jewelry  If you use oxygen at home, please bring your oxygen tank with you to hospital.  Arrange for someone to be with you and sign you out & drive you home after your procedure. THIS PERSON MUST WAIT AT HOSPITAL THE ENTIRE TIME. We allow 2 adult visitors with you in the hospital & masks are strongly recommended.     WOMEN ONLY OF CHILDBEARING AGE: Please make sure to be able to give a urine sample on arrival      If you have further questions, you may contact your Endoscopist's office or Pre Admission Testing staff at [phone]

## 2023-07-14 ENCOUNTER — ANESTHESIA EVENT (OUTPATIENT)
Dept: ENDOSCOPY | Age: 68
End: 2023-07-14
Payer: MEDICARE

## 2023-07-14 ENCOUNTER — ANESTHESIA (OUTPATIENT)
Dept: ENDOSCOPY | Age: 68
End: 2023-07-14
Payer: MEDICARE

## 2023-07-14 ENCOUNTER — HOSPITAL ENCOUNTER (OUTPATIENT)
Age: 68
Setting detail: OUTPATIENT SURGERY
Discharge: HOME | End: 2023-07-14
Attending: INTERNAL MEDICINE | Admitting: INTERNAL MEDICINE
Payer: MEDICARE

## 2023-07-14 VITALS
SYSTOLIC BLOOD PRESSURE: 117 MMHG | HEART RATE: 77 BPM | RESPIRATION RATE: 16 BRPM | WEIGHT: 200 LBS | BODY MASS INDEX: 27.09 KG/M2 | OXYGEN SATURATION: 94 % | TEMPERATURE: 97 F | HEIGHT: 72 IN | DIASTOLIC BLOOD PRESSURE: 54 MMHG

## 2023-07-14 DIAGNOSIS — Z85.048 HISTORY OF RECTAL CANCER: ICD-10-CM

## 2023-07-14 LAB
GLUCOSE BLD-MCNC: 185 MG/DL (ref 70–99)
PERFORMED ON: ABNORMAL

## 2023-07-14 PROCEDURE — 3700000000 HC ANESTHESIA ATTENDED CARE: Performed by: INTERNAL MEDICINE

## 2023-07-14 PROCEDURE — 7100000011 HC PHASE II RECOVERY - ADDTL 15 MIN: Performed by: INTERNAL MEDICINE

## 2023-07-14 PROCEDURE — 6360000002 HC RX W HCPCS: Performed by: NURSE ANESTHETIST, CERTIFIED REGISTERED

## 2023-07-14 PROCEDURE — 2580000003 HC RX 258: Performed by: ANESTHESIOLOGY

## 2023-07-14 PROCEDURE — 2709999900 HC NON-CHARGEABLE SUPPLY: Performed by: INTERNAL MEDICINE

## 2023-07-14 PROCEDURE — 7100000010 HC PHASE II RECOVERY - FIRST 15 MIN: Performed by: INTERNAL MEDICINE

## 2023-07-14 PROCEDURE — 3700000001 HC ADD 15 MINUTES (ANESTHESIA): Performed by: INTERNAL MEDICINE

## 2023-07-14 PROCEDURE — 3609010600 HC COLONOSCOPY POLYPECTOMY SNARE/COLD BIOPSY: Performed by: INTERNAL MEDICINE

## 2023-07-14 PROCEDURE — 88305 TISSUE EXAM BY PATHOLOGIST: CPT

## 2023-07-14 RX ORDER — SODIUM CHLORIDE 0.9 % (FLUSH) 0.9 %
5-40 SYRINGE (ML) INJECTION PRN
Status: DISCONTINUED | OUTPATIENT
Start: 2023-07-14 | End: 2023-07-14 | Stop reason: HOSPADM

## 2023-07-14 RX ORDER — SODIUM CHLORIDE 0.9 % (FLUSH) 0.9 %
5-40 SYRINGE (ML) INJECTION EVERY 12 HOURS SCHEDULED
Status: DISCONTINUED | OUTPATIENT
Start: 2023-07-14 | End: 2023-07-14 | Stop reason: HOSPADM

## 2023-07-14 RX ORDER — DIAZEPAM 10 MG/1
TABLET ORAL
COMMUNITY
Start: 2023-07-08

## 2023-07-14 RX ORDER — LIDOCAINE HYDROCHLORIDE 10 MG/ML
1 INJECTION, SOLUTION EPIDURAL; INFILTRATION; INTRACAUDAL; PERINEURAL
Status: DISCONTINUED | OUTPATIENT
Start: 2023-07-14 | End: 2023-07-14 | Stop reason: HOSPADM

## 2023-07-14 RX ORDER — SODIUM CHLORIDE, SODIUM LACTATE, POTASSIUM CHLORIDE, CALCIUM CHLORIDE 600; 310; 30; 20 MG/100ML; MG/100ML; MG/100ML; MG/100ML
INJECTION, SOLUTION INTRAVENOUS CONTINUOUS
Status: DISCONTINUED | OUTPATIENT
Start: 2023-07-14 | End: 2023-07-14 | Stop reason: HOSPADM

## 2023-07-14 RX ORDER — PROPOFOL 10 MG/ML
INJECTION, EMULSION INTRAVENOUS CONTINUOUS PRN
Status: DISCONTINUED | OUTPATIENT
Start: 2023-07-14 | End: 2023-07-14 | Stop reason: SDUPTHER

## 2023-07-14 RX ORDER — LIDOCAINE HYDROCHLORIDE 20 MG/ML
INJECTION, SOLUTION INTRAVENOUS PRN
Status: DISCONTINUED | OUTPATIENT
Start: 2023-07-14 | End: 2023-07-14 | Stop reason: SDUPTHER

## 2023-07-14 RX ADMIN — LIDOCAINE HYDROCHLORIDE 100 MG: 20 INJECTION, SOLUTION INTRAVENOUS at 10:08

## 2023-07-14 RX ADMIN — SODIUM CHLORIDE, POTASSIUM CHLORIDE, SODIUM LACTATE AND CALCIUM CHLORIDE: 600; 310; 30; 20 INJECTION, SOLUTION INTRAVENOUS at 09:38

## 2023-07-14 RX ADMIN — PROPOFOL 165.38 MCG/KG/MIN: 10 INJECTION, EMULSION INTRAVENOUS at 10:08

## 2023-07-14 ASSESSMENT — PAIN SCALES - GENERAL
PAINLEVEL_OUTOF10: 0
PAINLEVEL_OUTOF10: 0

## 2023-07-14 ASSESSMENT — PAIN - FUNCTIONAL ASSESSMENT: PAIN_FUNCTIONAL_ASSESSMENT: 0-10

## 2023-07-14 ASSESSMENT — ENCOUNTER SYMPTOMS: SHORTNESS OF BREATH: 0

## 2023-07-14 NOTE — PROCEDURES
procedure well and was taken to the recovery room in good condition. Complications: none  EBL: None    Findings:  Visualization of the terminal ileum demonstrated normal mucosa. The mucosa in cecum, ascending colon, transverse colon, descending colon, sigmoid colon and rectum was normal.  3 mm polyp in the cecum removed by cold biopsy forceps. 3 mm polyp in the transverse colon removed by cold biopsy forceps. 2 mm polyp in the rectum removed by cold biopsy forceps. Medium size internal hemorrhoids seen upon retroflexion in the rectum. Impression:    3 mm polyp in the cecum removed by cold biopsy forceps. 3 mm polyp in the transverse colon removed by cold biopsy forceps. 2 mm polyp in the rectum removed by cold biopsy forceps. Recommendations:     Follow-up pathology results  High-fiber diet  Recommend surveillance colonoscopy in 5 years          Konrad Ashton MD  [facility]  [phone]    7/14/2023

## 2023-07-14 NOTE — DISCHARGE INSTRUCTIONS
cold biopsy forceps. 2 mm polyp in the rectum removed by cold biopsy forceps. Medium size internal hemorrhoids seen upon retroflexion in the rectum. Impression:    3 mm polyp in the cecum removed by cold biopsy forceps. 3 mm polyp in the transverse colon removed by cold biopsy forceps. 2 mm polyp in the rectum removed by cold biopsy forceps. Recommendations: Follow-up pathology results  High-fiber diet  Recommend surveillance colonoscopy in 5 years      Please review these discharge instructions this evening or tomorrow for  information you may have forgotten. We want to thank you for choosing the [facility] as your health care provider. We always strive to provide you with excellent care while you are here. You may receive a survey in the mail regarding your care. We would appreciate you taking a few minutes of your time to complete this survey.

## 2023-07-14 NOTE — PROGRESS NOTES
Ambulatory Surgery/Procedure Discharge Note    Vitals:    07/14/23 1040   BP: (!) 117/54   Pulse: 77   Resp: 16   Temp:    SpO2: 94%   Bp meets Fidel standard    In: 520 [P.O.:120; I.V.:400]  Out: -     Restroom use offered before discharge. Yes    Pain assessment:  none  Pain Level: 0    Patient denies pain. Patient tolerating all PO intake. Patient voided before discharge. Discharge instructions given to patient and patients wife. Patient is ready for discharge. Patient discharged to home/self care.  Patient discharged via wheel chair by transporter to waiting family/S.O.       7/14/2023 11:21 AM

## 2023-07-14 NOTE — ANESTHESIA PRE PROCEDURE
Department of Anesthesiology  Preprocedure Note       Name:  Tatiana Stanford   Age:  70 y.o.  :  1955                                          MRN:  [id number]         Date:  2023      Surgeon: Hakan Fontenot):  Beau William MD    Procedure: Procedure(s):  COLONOSCOPY    Medications prior to admission:   Prior to Admission medications    Medication Sig Start Date End Date Taking?  Authorizing Provider   glipiZIDE (GLUCOTROL XL) 10 MG extended release tablet Take 1 tablet by mouth once daily 23   LUCY Becerra CNP   diazePAM (VALIUM) 10 MG tablet TAKE 1 TABLET BY MOUTH ONCE DAILY AS NEEDED FOR ANXIETY 23   Historical Provider, MD   lisinopril (PRINIVIL;ZESTRIL) 20 MG tablet Take 1 tablet by mouth once daily 23   LUCY Becerra CNP   ibuprofen (ADVIL;MOTRIN) 600 MG tablet TAKE 1 TABLET BY MOUTH EVERY 6 HOURS WITH FOOD AS NEEDED FOR PAIN 23   LUCY Becerra CNP   varenicline (CHANTIX) 1 MG tablet Take 1 tablet by mouth twice daily 23   LUCY Becerra CNP   atorvastatin (LIPITOR) 40 MG tablet Take 1 tablet by mouth once daily 6/15/23   LUCY Becerra CNP   FLUoxetine (PROZAC) 20 MG capsule Take 1 capsule by mouth once daily 23   LUCY Becerra CNP   sildenafil (VIAGRA) 100 MG tablet TAKE 1 TABLET BY MOUTH AS NEEDED FOR ERECTILE DYSFUNCTION 23  LUCY Quiroga CNP   Continuous Blood Gluc  (FREESTYLE DEMETRI READER) JENNIFER Testing 4 times daily per e11.9 23   LUCY Becerra CNP   Continuous Blood Gluc Sensor (FREESTYLE DEMETRI SENSOR SYSTEM) MISC Testing 4 times daily per e11.9 23   LUCY Becerra CNP   FLUoxetine (PROZAC) 10 MG capsule Take 1 capsule by mouth once daily 23   LUCY Becerra CNP   metFORMIN (GLUCOPHAGE-XR) 500 MG extended release tablet TAKE 4 TABLETS BY MOUTH ONCE DAILY WITH BREAKFAST  Patient

## 2023-07-14 NOTE — ANESTHESIA POSTPROCEDURE EVALUATION
Department of Anesthesiology  Postprocedure Note    Patient: Dorothea Jordan  MRN: [id number]  YOB: 1955  Date of evaluation: 7/14/2023      Procedure Summary     Date: 07/14/23 Room / Location: [address] [place] [address]    Anesthesia Start: 1007 Anesthesia Stop: 8872    Procedure: COLONOSCOPY POLYPECTOMY SNARE/COLD BIOPSY Diagnosis:       History of rectal cancer      (History of rectal cancer [Z85.048])    Surgeons: Jessa Miramontes MD Responsible Provider: Dolph Nyhan, DO    Anesthesia Type: MAC ASA Status: 2          Anesthesia Type: No value filed.     Fidel Phase I: Fidel Score: 10    Fidel Phase II: Fidel Score: 10      Anesthesia Post Evaluation    Patient location during evaluation: PACU  Patient participation: complete - patient participated  Level of consciousness: awake and awake and alert  Pain score: 0  Airway patency: patent  Nausea & Vomiting: no nausea and no vomiting  Complications: no  Cardiovascular status: hemodynamically stable  Respiratory status: acceptable  Hydration status: euvolemic

## 2023-07-24 RX ORDER — FLUOXETINE 10 MG/1
CAPSULE ORAL
Qty: 90 CAPSULE | Refills: 0 | Status: SHIPPED | OUTPATIENT
Start: 2023-07-24

## 2023-07-24 NOTE — TELEPHONE ENCOUNTER
Requested Prescriptions     Pending Prescriptions Disp Refills    FLUoxetine (PROZAC) 10 MG capsule [Pharmacy Med Name: FLUoxetine HCl 10 MG Oral Capsule] 90 capsule 0     Sig: Take 1 capsule by mouth once daily          Last Office Visit: 2/15/2023    Next Office Visit: 8/15/2023    Last Labs:  2/15/2023

## 2023-07-25 RX ORDER — FLUOXETINE 10 MG/1
CAPSULE ORAL
Qty: 90 CAPSULE | Refills: 0 | OUTPATIENT
Start: 2023-07-25

## 2023-08-06 DIAGNOSIS — F41.9 ANXIETY: Primary | ICD-10-CM

## 2023-08-07 RX ORDER — DIAZEPAM 10 MG/1
TABLET ORAL
Qty: 28 TABLET | Refills: 0 | Status: SHIPPED | OUTPATIENT
Start: 2023-08-07 | End: 2023-09-06

## 2023-08-07 NOTE — TELEPHONE ENCOUNTER
Requested Prescriptions     Pending Prescriptions Disp Refills    diazePAM (VALIUM) 10 MG tablet [Pharmacy Med Name: diazePAM 10 MG Oral Tablet] 28 tablet 0     Sig: TAKE 1 TABLET BY MOUTH ONCE DAILY AS NEEDED FOR ANXIETY     Last ov 2/15/23  Last lab 2/15/23  Next ov 8/15/23

## 2023-08-14 RX ORDER — IBUPROFEN 600 MG/1
TABLET ORAL
Qty: 120 TABLET | Refills: 0 | Status: SHIPPED | OUTPATIENT
Start: 2023-08-14

## 2023-08-14 NOTE — TELEPHONE ENCOUNTER
Requested Prescriptions     Pending Prescriptions Disp Refills    ibuprofen (ADVIL;MOTRIN) 600 MG tablet [Pharmacy Med Name: Ibuprofen 600 MG Oral Tablet] 120 tablet 0     Sig: TAKE 1 TABLET BY MOUTH EVERY 6 HOURS WITH FOOD AS NEEDED FOR PAIN          Last Office Visit: 2/15/2023    Next Office Visit: 8/15/2023     Last Labs: 2/15/2023

## 2023-08-15 ENCOUNTER — OFFICE VISIT (OUTPATIENT)
Dept: FAMILY MEDICINE CLINIC | Age: 68
End: 2023-08-15

## 2023-08-15 VITALS
WEIGHT: 205 LBS | BODY MASS INDEX: 27.8 KG/M2 | TEMPERATURE: 97.1 F | OXYGEN SATURATION: 96 % | HEART RATE: 78 BPM | SYSTOLIC BLOOD PRESSURE: 112 MMHG | DIASTOLIC BLOOD PRESSURE: 72 MMHG

## 2023-08-15 DIAGNOSIS — F41.9 ANXIETY: ICD-10-CM

## 2023-08-15 DIAGNOSIS — Z79.4 TYPE 2 DIABETES MELLITUS WITH OTHER SPECIFIED COMPLICATION, WITH LONG-TERM CURRENT USE OF INSULIN (HCC): Primary | ICD-10-CM

## 2023-08-15 DIAGNOSIS — F41.1 ANXIETY STATE: ICD-10-CM

## 2023-08-15 DIAGNOSIS — Z87.891 PERSONAL HISTORY OF TOBACCO USE: ICD-10-CM

## 2023-08-15 DIAGNOSIS — E11.69 TYPE 2 DIABETES MELLITUS WITH OTHER SPECIFIED COMPLICATION, WITH LONG-TERM CURRENT USE OF INSULIN (HCC): Primary | ICD-10-CM

## 2023-08-15 DIAGNOSIS — F33.1 MODERATE EPISODE OF RECURRENT MAJOR DEPRESSIVE DISORDER (HCC): ICD-10-CM

## 2023-08-15 DIAGNOSIS — I10 ESSENTIAL HYPERTENSION: ICD-10-CM

## 2023-08-15 SDOH — ECONOMIC STABILITY: HOUSING INSECURITY
IN THE LAST 12 MONTHS, WAS THERE A TIME WHEN YOU DID NOT HAVE A STEADY PLACE TO SLEEP OR SLEPT IN A SHELTER (INCLUDING NOW)?: NO

## 2023-08-15 SDOH — ECONOMIC STABILITY: FOOD INSECURITY: WITHIN THE PAST 12 MONTHS, THE FOOD YOU BOUGHT JUST DIDN'T LAST AND YOU DIDN'T HAVE MONEY TO GET MORE.: NEVER TRUE

## 2023-08-15 SDOH — ECONOMIC STABILITY: FOOD INSECURITY: WITHIN THE PAST 12 MONTHS, YOU WORRIED THAT YOUR FOOD WOULD RUN OUT BEFORE YOU GOT MONEY TO BUY MORE.: NEVER TRUE

## 2023-08-15 SDOH — ECONOMIC STABILITY: INCOME INSECURITY: HOW HARD IS IT FOR YOU TO PAY FOR THE VERY BASICS LIKE FOOD, HOUSING, MEDICAL CARE, AND HEATING?: NOT HARD AT ALL

## 2023-08-15 ASSESSMENT — ENCOUNTER SYMPTOMS
COUGH: 0
BACK PAIN: 0
COLOR CHANGE: 0
SHORTNESS OF BREATH: 0
RHINORRHEA: 0
CHEST TIGHTNESS: 0
NAUSEA: 0
EYE ITCHING: 0
VOMITING: 0
WHEEZING: 0
ABDOMINAL PAIN: 0
BLOOD IN STOOL: 0
SORE THROAT: 0
DIARRHEA: 0
EYE REDNESS: 0
CONSTIPATION: 0
SINUS PRESSURE: 0

## 2023-08-28 RX ORDER — FLUOXETINE HYDROCHLORIDE 20 MG/1
CAPSULE ORAL
Qty: 90 CAPSULE | Refills: 0 | Status: SHIPPED | OUTPATIENT
Start: 2023-08-28

## 2023-09-04 DIAGNOSIS — F41.9 ANXIETY: ICD-10-CM

## 2023-09-05 RX ORDER — DIAZEPAM 10 MG/1
TABLET ORAL
Qty: 28 TABLET | Refills: 0 | Status: SHIPPED | OUTPATIENT
Start: 2023-09-05 | End: 2023-10-05

## 2023-09-05 NOTE — TELEPHONE ENCOUNTER
Requested Prescriptions     Pending Prescriptions Disp Refills    diazePAM (VALIUM) 10 MG tablet [Pharmacy Med Name: diazePAM 10 MG Oral Tablet] 28 tablet 0     Sig: TAKE 1 TABLET BY MOUTH ONCE DAILY AS NEEDED FOR ANXIETY          Last Office Visit: 8/15/2023     Next Office Visit: 2/15/2024     Last Labs: 2/15/2023

## 2023-09-06 RX ORDER — ATORVASTATIN CALCIUM 40 MG/1
TABLET, FILM COATED ORAL
Qty: 90 TABLET | Refills: 0 | Status: SHIPPED | OUTPATIENT
Start: 2023-09-06

## 2023-09-15 RX ORDER — ATORVASTATIN CALCIUM 40 MG/1
TABLET, FILM COATED ORAL
Qty: 90 TABLET | Refills: 0 | Status: SHIPPED | OUTPATIENT
Start: 2023-09-15

## 2023-09-15 RX ORDER — VARENICLINE TARTRATE 1 MG/1
TABLET, FILM COATED ORAL
Qty: 60 TABLET | Refills: 0 | Status: SHIPPED | OUTPATIENT
Start: 2023-09-15

## 2023-09-18 RX ORDER — IBUPROFEN 600 MG/1
TABLET ORAL
Qty: 120 TABLET | Refills: 0 | Status: SHIPPED | OUTPATIENT
Start: 2023-09-18

## 2023-10-01 DIAGNOSIS — F41.9 ANXIETY: ICD-10-CM

## 2023-10-02 RX ORDER — DIAZEPAM 10 MG/1
TABLET ORAL
Qty: 28 TABLET | Refills: 0 | Status: SHIPPED | OUTPATIENT
Start: 2023-10-02 | End: 2023-11-01

## 2023-10-04 RX ORDER — GLIPIZIDE 10 MG/1
TABLET, FILM COATED, EXTENDED RELEASE ORAL
Qty: 90 TABLET | Refills: 0 | Status: SHIPPED | OUTPATIENT
Start: 2023-10-04

## 2023-10-16 RX ORDER — LISINOPRIL 20 MG/1
TABLET ORAL
Qty: 90 TABLET | Refills: 0 | Status: SHIPPED | OUTPATIENT
Start: 2023-10-16

## 2023-10-16 NOTE — TELEPHONE ENCOUNTER
Medication:   Requested Prescriptions     Pending Prescriptions Disp Refills    lisinopril (PRINIVIL;ZESTRIL) 20 MG tablet [Pharmacy Med Name: Lisinopril 20 MG Oral Tablet] 90 tablet 0     Sig: Take 1 tablet by mouth once daily        Last Filled:      Patient Phone Number: [phone] (home)     Last appt: 8/15/2023   Next appt: 2/15/2024    Last OARRS:       7/2/2019     2:59 PM   RX Monitoring   Periodic Controlled Substance Monitoring Possible medication side effects, risk of tolerance/dependence & alternative treatments discussed.

## 2023-10-19 RX ORDER — FLUOXETINE 10 MG/1
CAPSULE ORAL
Qty: 90 CAPSULE | Refills: 0 | Status: SHIPPED | OUTPATIENT
Start: 2023-10-19

## 2023-10-23 ENCOUNTER — TELEPHONE (OUTPATIENT)
Dept: FAMILY MEDICINE CLINIC | Age: 68
End: 2023-10-23

## 2023-10-23 RX ORDER — IBUPROFEN 600 MG/1
TABLET ORAL
Qty: 120 TABLET | Refills: 0 | Status: SHIPPED | OUTPATIENT
Start: 2023-10-23

## 2023-10-23 NOTE — TELEPHONE ENCOUNTER
Pt tested positive COVID  Fever, fatigue, cough, can't stop sleeping, aching   Asking for [facility] [place], [place] [address] [place] [address] [phone]

## 2023-10-25 ENCOUNTER — TELEPHONE (OUTPATIENT)
Dept: FAMILY MEDICINE CLINIC | Age: 68
End: 2023-10-25

## 2023-10-25 NOTE — TELEPHONE ENCOUNTER
Pt is feeling better   Fever is gone right now  HR says he can go back to work if he gets a note from [employer] saying he can return to work with no restrictions

## 2023-10-30 DIAGNOSIS — F41.9 ANXIETY: ICD-10-CM

## 2023-10-30 RX ORDER — DIAZEPAM 10 MG/1
TABLET ORAL
Qty: 28 TABLET | Refills: 0 | Status: SHIPPED | OUTPATIENT
Start: 2023-10-30 | End: 2023-11-29

## 2023-11-17 NOTE — TELEPHONE ENCOUNTER
Requested Prescriptions     Pending Prescriptions Disp Refills    varenicline (CHANTIX) 1 MG tablet [Pharmacy Med Name: Varenicline Tartrate 1 MG Oral Tablet] 60 tablet 0     Sig: Take 1 tablet by mouth twice daily          Last Office Visit: 8/15/2023     Next Office Visit: 2/15/2024     Last Labs: 2/15/2022

## 2023-11-18 RX ORDER — VARENICLINE TARTRATE 1 MG/1
TABLET, FILM COATED ORAL
Qty: 60 TABLET | Refills: 0 | Status: SHIPPED | OUTPATIENT
Start: 2023-11-18

## 2023-11-27 NOTE — TELEPHONE ENCOUNTER
Requested Prescriptions     Pending Prescriptions Disp Refills    FLUoxetine (PROZAC) 20 MG capsule [Pharmacy Med Name: FLUoxetine HCl 20 MG Oral Capsule] 90 capsule 0     Sig: Take 1 capsule by mouth once daily          Last Office Visit: 8/15/2023     Next Office Visit: 2/15/2024     Last Labs: 2/15/2023

## 2023-11-27 NOTE — TELEPHONE ENCOUNTER
Requested Prescriptions     Pending Prescriptions Disp Refills    ibuprofen (ADVIL;MOTRIN) 600 MG tablet [Pharmacy Med Name: Ibuprofen 600 MG Oral Tablet] 120 tablet 0     Sig: TAKE 1 TABLET BY MOUTH EVERY 6 HOURS WITH FOOD AS NEEDED FOR PAIN          Last Office Visit: 8/15/2023     Next Office Visit: visit date not found    Last Labs: 2/15/2023

## 2023-11-28 DIAGNOSIS — F41.9 ANXIETY: ICD-10-CM

## 2023-11-28 RX ORDER — IBUPROFEN 600 MG/1
TABLET ORAL
Qty: 120 TABLET | Refills: 0 | Status: SHIPPED | OUTPATIENT
Start: 2023-11-28

## 2023-11-28 RX ORDER — DIAZEPAM 10 MG/1
TABLET ORAL
Qty: 28 TABLET | Refills: 0 | Status: SHIPPED | OUTPATIENT
Start: 2023-11-28 | End: 2023-12-28

## 2023-11-28 RX ORDER — FLUOXETINE HYDROCHLORIDE 20 MG/1
CAPSULE ORAL
Qty: 90 CAPSULE | Refills: 0 | Status: SHIPPED | OUTPATIENT
Start: 2023-11-28

## 2023-12-25 DIAGNOSIS — F41.9 ANXIETY: ICD-10-CM

## 2023-12-27 NOTE — TELEPHONE ENCOUNTER
Requested Prescriptions     Pending Prescriptions Disp Refills    glipiZIDE (GLUCOTROL XL) 10 MG extended release tablet [Pharmacy Med Name: glipiZIDE ER 10 MG Oral Tablet Extended Release 24 Hour] 90 tablet 0     Sig: Take 1 tablet by mouth once daily          Last Office Visit: 8/15/2023     Next Office Visit: 12/28/2023     Last Labs:

## 2023-12-28 ENCOUNTER — OFFICE VISIT (OUTPATIENT)
Dept: FAMILY MEDICINE CLINIC | Age: 68
End: 2023-12-28
Payer: MEDICARE

## 2023-12-28 VITALS
DIASTOLIC BLOOD PRESSURE: 68 MMHG | SYSTOLIC BLOOD PRESSURE: 130 MMHG | WEIGHT: 203 LBS | HEART RATE: 94 BPM | BODY MASS INDEX: 27.5 KG/M2 | TEMPERATURE: 98.5 F | HEIGHT: 72 IN | OXYGEN SATURATION: 94 %

## 2023-12-28 DIAGNOSIS — F41.1 ANXIETY STATE: ICD-10-CM

## 2023-12-28 DIAGNOSIS — R25.2 LEG CRAMP: Primary | ICD-10-CM

## 2023-12-28 DIAGNOSIS — F41.9 ANXIETY: ICD-10-CM

## 2023-12-28 PROCEDURE — G8427 DOCREV CUR MEDS BY ELIG CLIN: HCPCS | Performed by: NURSE PRACTITIONER

## 2023-12-28 PROCEDURE — 3017F COLORECTAL CA SCREEN DOC REV: CPT | Performed by: NURSE PRACTITIONER

## 2023-12-28 PROCEDURE — 3075F SYST BP GE 130 - 139MM HG: CPT | Performed by: NURSE PRACTITIONER

## 2023-12-28 PROCEDURE — 99214 OFFICE O/P EST MOD 30 MIN: CPT | Performed by: NURSE PRACTITIONER

## 2023-12-28 PROCEDURE — G8484 FLU IMMUNIZE NO ADMIN: HCPCS | Performed by: NURSE PRACTITIONER

## 2023-12-28 PROCEDURE — 1123F ACP DISCUSS/DSCN MKR DOCD: CPT | Performed by: NURSE PRACTITIONER

## 2023-12-28 PROCEDURE — G8417 CALC BMI ABV UP PARAM F/U: HCPCS | Performed by: NURSE PRACTITIONER

## 2023-12-28 PROCEDURE — 3078F DIAST BP <80 MM HG: CPT | Performed by: NURSE PRACTITIONER

## 2023-12-28 PROCEDURE — 4004F PT TOBACCO SCREEN RCVD TLK: CPT | Performed by: NURSE PRACTITIONER

## 2023-12-28 RX ORDER — DIAZEPAM 10 MG/1
TABLET ORAL
Qty: 28 TABLET | Refills: 0 | OUTPATIENT
Start: 2023-12-28

## 2023-12-28 RX ORDER — DIAZEPAM 10 MG/1
TABLET ORAL
Qty: 30 TABLET | Refills: 0 | Status: SHIPPED | OUTPATIENT
Start: 2023-12-28 | End: 2024-01-27

## 2023-12-28 RX ORDER — GLIPIZIDE 10 MG/1
TABLET, FILM COATED, EXTENDED RELEASE ORAL
Qty: 90 TABLET | Refills: 0 | Status: SHIPPED | OUTPATIENT
Start: 2023-12-28

## 2023-12-28 NOTE — PROGRESS NOTES
Rosey Zambrano (:  1955) is a 70 y.o. male,Established patient, here for evaluation of the following chief complaint(s):  Medication Check      ASSESSMENT/PLAN:  1. Leg cramp  Assessment & Plan:  Increase fluids   Stretches at night   Encouraged electrolyte drinks   Consider magnesium supplement   2. Anxiety  -     diazePAM (VALIUM) 10 MG tablet; TAKE 1 TABLET BY MOUTH ONCE DAILY AS NEEDED FOR ANXIETY, Disp-30 tablet, R-0Normal  3. Anxiety state  Assessment & Plan:  Stable, controlled   Continue Valium   OARRS reviewed   Follow up in 3 months       No follow-ups on file. SUBJECTIVE/OBJECTIVE:  HPI  Here for follow up of his anxiety. Has been doing well on valium. Working well to control his symptoms. Taking as prescribed      He also complains of muscle spasms in his legs that started a few months ago. He has been trying to drink more water. Recently had covid. Occurs mostly at night.      Current Outpatient Medications   Medication Sig Dispense Refill    glipiZIDE (GLUCOTROL XL) 10 MG extended release tablet Take 1 tablet by mouth once daily 90 tablet 0    diazePAM (VALIUM) 10 MG tablet TAKE 1 TABLET BY MOUTH ONCE DAILY AS NEEDED FOR ANXIETY 30 tablet 0    ibuprofen (ADVIL;MOTRIN) 600 MG tablet TAKE 1 TABLET BY MOUTH EVERY 6 HOURS WITH FOOD AS NEEDED FOR PAIN 120 tablet 0    FLUoxetine (PROZAC) 20 MG capsule Take 1 capsule by mouth once daily 90 capsule 0    varenicline (CHANTIX) 1 MG tablet Take 1 tablet by mouth twice daily 60 tablet 0    FLUoxetine (PROZAC) 10 MG capsule Take 1 capsule by mouth once daily 90 capsule 0    lisinopril (PRINIVIL;ZESTRIL) 20 MG tablet Take 1 tablet by mouth once daily 90 tablet 0    atorvastatin (LIPITOR) 40 MG tablet Take 1 tablet by mouth once daily 90 tablet 0    Continuous Blood Gluc  (FREESTYLE DEMETRI READER) JENNIFER Testing 4 times daily per e11.9 1 each 0    Continuous Blood Gluc Sensor (FREESTYLE DEMETRI SENSOR SYSTEM) MISC Testing 4 times daily per e11.9 3

## 2023-12-28 NOTE — ASSESSMENT & PLAN NOTE
Increase fluids   Stretches at night   Encouraged electrolyte drinks   Consider magnesium supplement

## 2024-01-03 RX ORDER — VARENICLINE TARTRATE 1 MG/1
TABLET, FILM COATED ORAL
Qty: 60 TABLET | Refills: 0 | Status: SHIPPED | OUTPATIENT
Start: 2024-01-03

## 2024-01-08 ENCOUNTER — HOSPITAL ENCOUNTER (OUTPATIENT)
Dept: CT IMAGING | Age: 69
Discharge: HOME | End: 2024-01-08
Payer: MEDICARE

## 2024-01-08 DIAGNOSIS — Z87.891 PERSONAL HISTORY OF TOBACCO USE: ICD-10-CM

## 2024-01-08 PROCEDURE — 71271 CT THORAX LUNG CANCER SCR C-: CPT

## 2024-01-15 RX ORDER — LISINOPRIL 20 MG/1
TABLET ORAL
Qty: 90 TABLET | Refills: 0 | Status: SHIPPED | OUTPATIENT
Start: 2024-01-15

## 2024-01-15 NOTE — TELEPHONE ENCOUNTER
Requested Prescriptions     Pending Prescriptions Disp Refills    lisinopril (PRINIVIL;ZESTRIL) 20 MG tablet [Pharmacy Med Name: Lisinopril 20 MG Oral Tablet] 90 tablet 0     Sig: Take 1 tablet by mouth once daily          Last Office Visit: 12/28/2023     Next Office Visit: 3/14/2024

## 2024-01-18 RX ORDER — FLUOXETINE 10 MG/1
CAPSULE ORAL
Qty: 90 CAPSULE | Refills: 0 | Status: SHIPPED | OUTPATIENT
Start: 2024-01-18

## 2024-01-22 RX ORDER — IBUPROFEN 600 MG/1
TABLET ORAL
Qty: 120 TABLET | Refills: 0 | Status: SHIPPED | OUTPATIENT
Start: 2024-01-22

## 2024-01-24 DIAGNOSIS — F41.9 ANXIETY: ICD-10-CM

## 2024-01-24 RX ORDER — DIAZEPAM 10 MG/1
TABLET ORAL
Qty: 30 TABLET | Refills: 0 | Status: SHIPPED | OUTPATIENT
Start: 2024-01-24 | End: 2024-02-23

## 2024-01-25 DIAGNOSIS — F41.9 ANXIETY: ICD-10-CM

## 2024-01-25 RX ORDER — DIAZEPAM 10 MG/1
TABLET ORAL
Qty: 30 TABLET | Refills: 0 | OUTPATIENT
Start: 2024-01-25

## 2024-02-02 DIAGNOSIS — E11.9 TYPE 2 DIABETES MELLITUS WITHOUT COMPLICATION, UNSPECIFIED WHETHER LONG TERM INSULIN USE (HCC): ICD-10-CM

## 2024-02-04 RX ORDER — INSULIN GLARGINE 100 [IU]/ML
INJECTION, SOLUTION SUBCUTANEOUS
Qty: 6 ML | Refills: 0 | Status: SHIPPED | OUTPATIENT
Start: 2024-02-04

## 2024-02-23 NOTE — TELEPHONE ENCOUNTER
Requested Prescriptions     Pending Prescriptions Disp Refills    FLUoxetine (PROZAC) 20 MG capsule [Pharmacy Med Name: FLUoxetine HCl 20 MG Oral Capsule] 90 capsule 0     Sig: Take 1 capsule by mouth once daily    varenicline (CHANTIX) 1 MG tablet [Pharmacy Med Name: Varenicline Tartrate 1 MG Oral Tablet] 60 tablet 0     Sig: Take 1 tablet by mouth twice daily          Last Office Visit: 12/28/2023     Next Office Visit: 3/14/2024     Last Labs:

## 2024-02-24 DIAGNOSIS — F41.9 ANXIETY: ICD-10-CM

## 2024-02-26 DIAGNOSIS — F41.9 ANXIETY: ICD-10-CM

## 2024-02-26 RX ORDER — VARENICLINE TARTRATE 1 MG/1
TABLET, FILM COATED ORAL
Qty: 60 TABLET | Refills: 0 | Status: SHIPPED | OUTPATIENT
Start: 2024-02-26

## 2024-02-26 RX ORDER — FLUOXETINE HYDROCHLORIDE 20 MG/1
CAPSULE ORAL
Qty: 90 CAPSULE | Refills: 0 | Status: SHIPPED | OUTPATIENT
Start: 2024-02-26

## 2024-02-26 RX ORDER — DIAZEPAM 10 MG/1
TABLET ORAL
Qty: 30 TABLET | Refills: 0 | OUTPATIENT
Start: 2024-02-26

## 2024-02-26 RX ORDER — DIAZEPAM 10 MG/1
TABLET ORAL
Qty: 30 TABLET | Refills: 0 | Status: SHIPPED | OUTPATIENT
Start: 2024-02-26 | End: 2024-03-27

## 2024-03-07 RX ORDER — IBUPROFEN 600 MG/1
TABLET ORAL
Qty: 120 TABLET | Refills: 0 | Status: SHIPPED | OUTPATIENT
Start: 2024-03-07

## 2024-03-14 ENCOUNTER — OFFICE VISIT (OUTPATIENT)
Dept: FAMILY MEDICINE CLINIC | Age: 69
End: 2024-03-14

## 2024-03-14 VITALS
HEIGHT: 72 IN | HEART RATE: 68 BPM | DIASTOLIC BLOOD PRESSURE: 70 MMHG | OXYGEN SATURATION: 95 % | WEIGHT: 201 LBS | TEMPERATURE: 98.6 F | SYSTOLIC BLOOD PRESSURE: 130 MMHG | BODY MASS INDEX: 27.22 KG/M2

## 2024-03-14 DIAGNOSIS — Z79.4 TYPE 2 DIABETES MELLITUS WITH OTHER SPECIFIED COMPLICATION, WITH LONG-TERM CURRENT USE OF INSULIN (HCC): ICD-10-CM

## 2024-03-14 DIAGNOSIS — E11.9 TYPE 2 DIABETES MELLITUS WITHOUT COMPLICATION, UNSPECIFIED WHETHER LONG TERM INSULIN USE (HCC): ICD-10-CM

## 2024-03-14 DIAGNOSIS — E01.0 THYROMEGALY: ICD-10-CM

## 2024-03-14 DIAGNOSIS — E11.69 TYPE 2 DIABETES MELLITUS WITH OTHER SPECIFIED COMPLICATION, WITH LONG-TERM CURRENT USE OF INSULIN (HCC): ICD-10-CM

## 2024-03-14 DIAGNOSIS — Z00.00 ENCOUNTER FOR ANNUAL WELLNESS VISIT (AWV) IN MEDICARE PATIENT: Primary | ICD-10-CM

## 2024-03-14 DIAGNOSIS — F33.1 MODERATE EPISODE OF RECURRENT MAJOR DEPRESSIVE DISORDER (HCC): ICD-10-CM

## 2024-03-14 DIAGNOSIS — Z00.00 ENCOUNTER FOR ANNUAL WELLNESS VISIT (AWV) IN MEDICARE PATIENT: ICD-10-CM

## 2024-03-14 LAB
ALBUMIN SERPL-MCNC: 4.4 G/DL (ref 3.4–5)
ALBUMIN/GLOB SERPL: 1.6 {RATIO} (ref 1.1–2.2)
ALP SERPL-CCNC: 128 U/L (ref 40–129)
ALT SERPL-CCNC: 48 U/L (ref 10–40)
ANION GAP SERPL CALCULATED.3IONS-SCNC: 15 MMOL/L (ref 3–16)
AST SERPL-CCNC: 40 U/L (ref 15–37)
BILIRUB SERPL-MCNC: 0.5 MG/DL (ref 0–1)
BUN SERPL-MCNC: 14 MG/DL (ref 7–20)
CALCIUM SERPL-MCNC: 9.9 MG/DL (ref 8.3–10.6)
CHLORIDE SERPL-SCNC: 97 MMOL/L (ref 99–110)
CHOLEST SERPL-MCNC: 127 MG/DL (ref 0–199)
CO2 SERPL-SCNC: 23 MMOL/L (ref 21–32)
CREAT SERPL-MCNC: 0.7 MG/DL (ref 0.8–1.3)
DEPRECATED RDW RBC AUTO: 13.1 % (ref 12.4–15.4)
GFR SERPLBLD CREATININE-BSD FMLA CKD-EPI: >60 ML/MIN/{1.73_M2}
GLUCOSE SERPL-MCNC: 217 MG/DL (ref 70–99)
HCT VFR BLD AUTO: 45.9 % (ref 40.5–52.5)
HDLC SERPL-MCNC: 41 MG/DL (ref 40–60)
HGB BLD-MCNC: 16 G/DL (ref 13.5–17.5)
LDL CHOLESTEROL CALCULATED: ABNORMAL MG/DL
LDLC SERPL-MCNC: 42 MG/DL
MCH RBC QN AUTO: 31.7 PG (ref 26–34)
MCHC RBC AUTO-ENTMCNC: 34.9 G/DL (ref 31–36)
MCV RBC AUTO: 90.9 FL (ref 80–100)
PLATELET # BLD AUTO: 276 K/UL (ref 135–450)
PMV BLD AUTO: 8.2 FL (ref 5–10.5)
POTASSIUM SERPL-SCNC: 4.3 MMOL/L (ref 3.5–5.1)
PROT SERPL-MCNC: 7.2 G/DL (ref 6.4–8.2)
RBC # BLD AUTO: 5.05 M/UL (ref 4.2–5.9)
SODIUM SERPL-SCNC: 135 MMOL/L (ref 136–145)
TRIGL SERPL-MCNC: 308 MG/DL (ref 0–150)
TSH SERPL DL<=0.005 MIU/L-ACNC: 1.73 UIU/ML (ref 0.27–4.2)
VLDLC SERPL CALC-MCNC: ABNORMAL MG/DL
WBC # BLD AUTO: 7.4 K/UL (ref 4–11)

## 2024-03-14 RX ORDER — ATORVASTATIN CALCIUM 40 MG/1
40 TABLET, FILM COATED ORAL DAILY
Qty: 90 TABLET | Refills: 0 | Status: SHIPPED | OUTPATIENT
Start: 2024-03-14

## 2024-03-14 ASSESSMENT — ENCOUNTER SYMPTOMS
ABDOMINAL PAIN: 0
EYE REDNESS: 0
BLOOD IN STOOL: 0
WHEEZING: 0
NAUSEA: 0
PHOTOPHOBIA: 0
STRIDOR: 0
SINUS PAIN: 0
DIARRHEA: 0
BACK PAIN: 0
EYE DISCHARGE: 0
SHORTNESS OF BREATH: 0
VOMITING: 0
SORE THROAT: 0
CONSTIPATION: 0
COUGH: 0
EYE PAIN: 0

## 2024-03-14 ASSESSMENT — PATIENT HEALTH QUESTIONNAIRE - PHQ9
3. TROUBLE FALLING OR STAYING ASLEEP: 3
8. MOVING OR SPEAKING SO SLOWLY THAT OTHER PEOPLE COULD HAVE NOTICED. OR THE OPPOSITE, BEING SO FIGETY OR RESTLESS THAT YOU HAVE BEEN MOVING AROUND A LOT MORE THAN USUAL: 1
9. THOUGHTS THAT YOU WOULD BE BETTER OFF DEAD, OR OF HURTING YOURSELF: 0
SUM OF ALL RESPONSES TO PHQ9 QUESTIONS 1 & 2: 3
SUM OF ALL RESPONSES TO PHQ QUESTIONS 1-9: 15
SUM OF ALL RESPONSES TO PHQ QUESTIONS 1-9: 15
7. TROUBLE CONCENTRATING ON THINGS, SUCH AS READING THE NEWSPAPER OR WATCHING TELEVISION: 2
4. FEELING TIRED OR HAVING LITTLE ENERGY: 2
SUM OF ALL RESPONSES TO PHQ QUESTIONS 1-9: 15
10. IF YOU CHECKED OFF ANY PROBLEMS, HOW DIFFICULT HAVE THESE PROBLEMS MADE IT FOR YOU TO DO YOUR WORK, TAKE CARE OF THINGS AT HOME, OR GET ALONG WITH OTHER PEOPLE: 1
1. LITTLE INTEREST OR PLEASURE IN DOING THINGS: 1
5. POOR APPETITE OR OVEREATING: 1
2. FEELING DOWN, DEPRESSED OR HOPELESS: 2
6. FEELING BAD ABOUT YOURSELF - OR THAT YOU ARE A FAILURE OR HAVE LET YOURSELF OR YOUR FAMILY DOWN: 3
SUM OF ALL RESPONSES TO PHQ QUESTIONS 1-9: 15
DEPRESSION UNABLE TO ASSESS: FUNCTIONAL CAPACITY MOTIVATION LIMITS ACCURACY

## 2024-03-14 ASSESSMENT — LIFESTYLE VARIABLES
HOW MANY STANDARD DRINKS CONTAINING ALCOHOL DO YOU HAVE ON A TYPICAL DAY: PATIENT DOES NOT DRINK
HOW OFTEN DO YOU HAVE A DRINK CONTAINING ALCOHOL: NEVER

## 2024-03-14 NOTE — PROGRESS NOTES
use ([facility])  Assessment & Plan:  A1c ordered with labs      Orders:  -     Comprehensive Metabolic Panel; Future  -     CBC; Future  -     Lipid, Fasting; Future  -     Hemoglobin A1C; Future  Type 2 diabetes mellitus with other specified complication, with long-term current use of insulin ([facility])  Assessment & Plan:  A1c ordered with labs      Thyromegaly  -     TSH with Reflex; Future  Moderate episode of recurrent major depressive disorder ([facility])  Assessment & Plan:  Stable, controlled  No changes  Continue current treatment plan

## 2024-03-15 LAB
EST. AVERAGE GLUCOSE BLD GHB EST-MCNC: 223.1 MG/DL
HBA1C MFR BLD: 9.4 %

## 2024-03-19 ENCOUNTER — TELEPHONE (OUTPATIENT)
Dept: FAMILY MEDICINE CLINIC | Age: 69
End: 2024-03-19

## 2024-03-19 NOTE — TELEPHONE ENCOUNTER
Patient says the Jardiance was too expensive for him, and he wants to know if there is an alternative. Please contact patient concerning this matter.    [phone] (mslm)

## 2024-03-20 RX ORDER — CANAGLIFLOZIN 100 MG/1
100 TABLET, FILM COATED ORAL
Qty: 90 TABLET | Refills: 0 | Status: SHIPPED | OUTPATIENT
Start: 2024-03-20

## 2024-03-20 NOTE — TELEPHONE ENCOUNTER
Pt returning call to relay that Invokana was the same price as the Jardiance. Pt will not fill either medication as they are too expensive. Pt also relaying that he will not be taking Metformin again either. Pt asking for return call if any other medication is sent in. No additional questions or concerns at this time.

## 2024-03-20 NOTE — TELEPHONE ENCOUNTER
Spoke to pt and relayed information regarding new medication. Pt confirmed that he will see how much this is and if it is still too expensive, he will give our office a call to let us know. No additional questions or concerns at this time.

## 2024-03-25 DIAGNOSIS — F41.9 ANXIETY: ICD-10-CM

## 2024-03-25 RX ORDER — DIAZEPAM 10 MG/1
TABLET ORAL
Qty: 30 TABLET | Refills: 0 | Status: SHIPPED | OUTPATIENT
Start: 2024-03-25 | End: 2024-04-24

## 2024-04-01 RX ORDER — GLIPIZIDE 10 MG/1
TABLET, FILM COATED, EXTENDED RELEASE ORAL
Qty: 90 TABLET | Refills: 0 | Status: SHIPPED | OUTPATIENT
Start: 2024-04-01

## 2024-04-10 ENCOUNTER — TELEPHONE (OUTPATIENT)
Dept: FAMILY MEDICINE CLINIC | Age: 69
End: 2024-04-10

## 2024-04-10 RX ORDER — LISINOPRIL 20 MG/1
TABLET ORAL
Qty: 90 TABLET | Refills: 0 | Status: SHIPPED | OUTPATIENT
Start: 2024-04-10

## 2024-04-10 NOTE — TELEPHONE ENCOUNTER
Patient called to confirm if Bella received forms from [employer] for approval of his Marin 2 glucose monitor. Please call him concerning this matter.    [phone] (home)

## 2024-04-16 RX ORDER — FLUOXETINE 10 MG/1
CAPSULE ORAL
Qty: 90 CAPSULE | Refills: 0 | Status: SHIPPED | OUTPATIENT
Start: 2024-04-16

## 2024-04-16 NOTE — TELEPHONE ENCOUNTER
Requested Prescriptions     Pending Prescriptions Disp Refills    FLUoxetine (PROZAC) 10 MG capsule [Pharmacy Med Name: FLUoxetine HCl 10 MG Oral Capsule] 90 capsule 0     Sig: Take 1 capsule by mouth once daily         Last OV: 3/14/2024    Last labs: 3/14/2024     F/u: 6/13/2024

## 2024-04-17 RX ORDER — DULAGLUTIDE 0.75 MG/.5ML
INJECTION, SOLUTION SUBCUTANEOUS
Qty: 4 ML | Refills: 0 | Status: SHIPPED | OUTPATIENT
Start: 2024-04-17

## 2024-04-17 NOTE — TELEPHONE ENCOUNTER
Requested Prescriptions     Pending Prescriptions Disp Refills    TRULICITY 0.75 MG/0.5ML SOPN [Pharmacy Med Name: Trulicity 0.75 MG/0.5ML Subcutaneous Solution Pen-injector] 4 mL 0     Sig: INJECT 1 PEN  SUBCUTANEOUSLY ONCE A WEEK          Last Office Visit: 3/14/2024     Next Office Visit: 6/13/2024

## 2024-04-22 RX ORDER — VARENICLINE TARTRATE 1 MG/1
TABLET, FILM COATED ORAL
Qty: 60 TABLET | Refills: 0 | Status: SHIPPED | OUTPATIENT
Start: 2024-04-22

## 2024-04-22 NOTE — TELEPHONE ENCOUNTER
Requested Prescriptions     Pending Prescriptions Disp Refills    varenicline (CHANTIX) 1 MG tablet [Pharmacy Med Name: Varenicline Tartrate 1 MG Oral Tablet] 60 tablet 0     Sig: Take 1 tablet by mouth twice daily          Last Office Visit: 3/14/2024     Next Office Visit: 6/13/2024

## 2024-04-24 DIAGNOSIS — F41.9 ANXIETY: ICD-10-CM

## 2024-04-24 RX ORDER — DIAZEPAM 10 MG/1
TABLET ORAL
Qty: 30 TABLET | Refills: 0 | Status: SHIPPED | OUTPATIENT
Start: 2024-04-24 | End: 2024-05-24

## 2024-05-11 DIAGNOSIS — E11.9 TYPE 2 DIABETES MELLITUS WITHOUT COMPLICATION, UNSPECIFIED WHETHER LONG TERM INSULIN USE (HCC): ICD-10-CM

## 2024-05-13 RX ORDER — INSULIN GLARGINE 100 [IU]/ML
INJECTION, SOLUTION SUBCUTANEOUS
Qty: 6 ML | Refills: 0 | Status: SHIPPED | OUTPATIENT
Start: 2024-05-13

## 2024-05-14 ENCOUNTER — TELEPHONE (OUTPATIENT)
Dept: FAMILY MEDICINE CLINIC | Age: 69
End: 2024-05-14

## 2024-05-14 RX ORDER — AMOXICILLIN AND CLAVULANATE POTASSIUM 875; 125 MG/1; MG/1
1 TABLET, FILM COATED ORAL 2 TIMES DAILY
Qty: 14 TABLET | Refills: 0 | Status: SHIPPED | OUTPATIENT
Start: 2024-05-14 | End: 2024-05-21

## 2024-05-14 RX ORDER — IBUPROFEN 600 MG/1
TABLET ORAL
Qty: 120 TABLET | Refills: 0 | Status: SHIPPED | OUTPATIENT
Start: 2024-05-14

## 2024-05-14 NOTE — TELEPHONE ENCOUNTER
Sunday started with diarrhea, was having incontinence, this has gotten worse throguh the morning. Stayed home from work today. Had a protein shake today and had to go quickly. He took four imodium this morning, and this has not helped.  Will send abx in and advised if no improvement appointment.

## 2024-05-14 NOTE — TELEPHONE ENCOUNTER
Patient would like to talk with sana directly. He is not feeling well,but would not discuss what was wrong with him. Its his Birthday!!!

## 2024-05-15 RX ORDER — DULAGLUTIDE 0.75 MG/.5ML
INJECTION, SOLUTION SUBCUTANEOUS
Qty: 4 ML | Refills: 0 | Status: SHIPPED | OUTPATIENT
Start: 2024-05-15

## 2024-05-21 DIAGNOSIS — F41.9 ANXIETY: ICD-10-CM

## 2024-05-21 NOTE — TELEPHONE ENCOUNTER
Refill Request     Last Seen: 3/14/2024    Last Written: 4/24/24      Next Appointment:   Future Appointments   Date Time Provider Department Center   6/13/2024  1:00 PM Bella Green, APRN - CNP [facility]             Requested Prescriptions     Pending Prescriptions Disp Refills    diazePAM (VALIUM) 10 MG tablet [Pharmacy Med Name: diazePAM 10 MG Oral Tablet] 30 tablet 0     Sig: TAKE 1 TABLET BY MOUTH ONCE DAILY AS NEEDED FOR ANXIETY    FLUoxetine (PROZAC) 20 MG capsule [Pharmacy Med Name: FLUoxetine HCl 20 MG Oral Capsule] 90 capsule 0     Sig: Take 1 capsule by mouth once daily

## 2024-05-22 RX ORDER — FLUOXETINE HYDROCHLORIDE 20 MG/1
CAPSULE ORAL
Qty: 90 CAPSULE | Refills: 0 | Status: SHIPPED | OUTPATIENT
Start: 2024-05-22

## 2024-05-22 RX ORDER — DIAZEPAM 10 MG/1
TABLET ORAL
Qty: 30 TABLET | Refills: 0 | Status: SHIPPED | OUTPATIENT
Start: 2024-05-22 | End: 2024-06-21

## 2024-06-13 ENCOUNTER — OFFICE VISIT (OUTPATIENT)
Dept: FAMILY MEDICINE CLINIC | Age: 69
End: 2024-06-13

## 2024-06-13 VITALS
TEMPERATURE: 98.4 F | DIASTOLIC BLOOD PRESSURE: 70 MMHG | HEIGHT: 72 IN | HEART RATE: 69 BPM | WEIGHT: 194 LBS | SYSTOLIC BLOOD PRESSURE: 130 MMHG | BODY MASS INDEX: 26.28 KG/M2 | OXYGEN SATURATION: 93 %

## 2024-06-13 DIAGNOSIS — E11.9 TYPE 2 DIABETES MELLITUS WITHOUT COMPLICATION, UNSPECIFIED WHETHER LONG TERM INSULIN USE (HCC): Primary | ICD-10-CM

## 2024-06-13 LAB — HBA1C MFR BLD: 7 %

## 2024-06-13 ASSESSMENT — ENCOUNTER SYMPTOMS
COUGH: 0
SINUS PRESSURE: 0
SHORTNESS OF BREATH: 0
CONSTIPATION: 0
DIARRHEA: 0
BACK PAIN: 0
COLOR CHANGE: 0
SINUS PAIN: 0
WHEEZING: 0
ABDOMINAL PAIN: 0
NAUSEA: 1
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
## 2024-06-13 NOTE — ASSESSMENT & PLAN NOTE
Stable, controlled  He is having hypoglycemic episodes  Will stop glipizide  Encouraged him to track blood sugars routinely over the next several weeks  If continues to have hypoglycemia may need to lower Lantus  Continue Trulicity

## 2024-06-17 RX ORDER — ATORVASTATIN CALCIUM 40 MG/1
40 TABLET, FILM COATED ORAL DAILY
Qty: 90 TABLET | Refills: 0 | Status: SHIPPED | OUTPATIENT
Start: 2024-06-17

## 2024-06-17 RX ORDER — DULAGLUTIDE 0.75 MG/.5ML
INJECTION, SOLUTION SUBCUTANEOUS
Qty: 4 ML | Refills: 0 | Status: SHIPPED | OUTPATIENT
Start: 2024-06-17

## 2024-06-23 RX ORDER — VARENICLINE TARTRATE 1 MG/1
TABLET, FILM COATED ORAL
Qty: 60 TABLET | Refills: 0 | Status: SHIPPED | OUTPATIENT
Start: 2024-06-23

## 2024-06-24 DIAGNOSIS — F41.9 ANXIETY: ICD-10-CM

## 2024-06-24 RX ORDER — DIAZEPAM 10 MG/1
TABLET ORAL
Qty: 30 TABLET | Refills: 0 | Status: SHIPPED | OUTPATIENT
Start: 2024-06-24 | End: 2024-07-24

## 2024-06-24 NOTE — TELEPHONE ENCOUNTER
Requested Prescriptions     Pending Prescriptions Disp Refills    diazePAM (VALIUM) 10 MG tablet [Pharmacy Med Name: diazePAM 10 MG Oral Tablet] 30 tablet 0     Sig: TAKE 1 TABLET BY MOUTH ONCE DAILY AS NEEDED FOR ANXIETY          Last Office Visit: 6/13/2024     Next Office Visit: 9/12/2024

## 2024-07-08 RX ORDER — IBUPROFEN 600 MG/1
TABLET ORAL
Qty: 120 TABLET | Refills: 0 | Status: SHIPPED | OUTPATIENT
Start: 2024-07-08

## 2024-07-10 RX ORDER — FLUOXETINE 10 MG/1
CAPSULE ORAL
Qty: 90 CAPSULE | Refills: 0 | Status: SHIPPED | OUTPATIENT
Start: 2024-07-10

## 2024-07-10 RX ORDER — LISINOPRIL 20 MG/1
TABLET ORAL
Qty: 90 TABLET | Refills: 0 | Status: SHIPPED | OUTPATIENT
Start: 2024-07-10

## 2024-07-10 NOTE — TELEPHONE ENCOUNTER
Requested Prescriptions     Pending Prescriptions Disp Refills    lisinopril (PRINIVIL;ZESTRIL) 20 MG tablet [Pharmacy Med Name: Lisinopril 20 MG Oral Tablet] 90 tablet 0     Sig: Take 1 tablet by mouth once daily    FLUoxetine (PROZAC) 10 MG capsule [Pharmacy Med Name: FLUoxetine HCl 10 MG Oral Capsule] 90 capsule 0     Sig: Take 1 capsule by mouth once daily     LOV 6.13.24  FOV 9.12.24  Last Labs 3.14.24

## 2024-07-16 DIAGNOSIS — E11.9 TYPE 2 DIABETES MELLITUS WITHOUT COMPLICATION, UNSPECIFIED WHETHER LONG TERM INSULIN USE (HCC): ICD-10-CM

## 2024-07-16 RX ORDER — DULAGLUTIDE 0.75 MG/.5ML
INJECTION, SOLUTION SUBCUTANEOUS
Qty: 4 ML | Refills: 0 | Status: SHIPPED | OUTPATIENT
Start: 2024-07-16

## 2024-07-16 RX ORDER — SILDENAFIL 100 MG/1
100 TABLET, FILM COATED ORAL PRN
Qty: 6 TABLET | Refills: 0 | Status: SHIPPED | OUTPATIENT
Start: 2024-07-16 | End: 2024-07-26

## 2024-07-16 RX ORDER — INSULIN GLARGINE 100 [IU]/ML
INJECTION, SOLUTION SUBCUTANEOUS
Qty: 6 ML | Refills: 0 | Status: SHIPPED | OUTPATIENT
Start: 2024-07-16

## 2024-07-16 NOTE — TELEPHONE ENCOUNTER
Requested Prescriptions     Pending Prescriptions Disp Refills    LANTUS SOLOSTAR 100 UNIT/ML injection pen [Pharmacy Med Name: Lantus SoloStar 100 UNIT/ML Subcutaneous Solution Pen-injector] 6 mL 0     Sig: INJECT 35 UNITS SUBCUTANEOUSLY NIGHTLY     LOV 6.13.24  FOV 9.12.24

## 2024-07-16 NOTE — TELEPHONE ENCOUNTER
Requested Prescriptions     Pending Prescriptions Disp Refills    TRULICITY 0.75 MG/0.5ML SOPN SC injection [Pharmacy Med Name: Trulicity 0.75 MG/0.5ML Subcutaneous Solution Pen-injector] 4 mL 0     Sig: INJECT 1 SYRINGE (0.75MG) SUBCUTANEOUSLY ONCE A WEEK     LOV 6.13.24  FOV 9.12.24

## 2024-07-17 ENCOUNTER — TELEPHONE (OUTPATIENT)
Dept: FAMILY MEDICINE CLINIC | Age: 69
End: 2024-07-17

## 2024-07-17 NOTE — TELEPHONE ENCOUNTER
Pt's Trulicity went from $47 to $223  He's calling [employer] to see why it's so high or why he would be in the donut hole so soon   He took his last shot Saturday  He was been taken off glipizide and metformin so he will only be using LANTUS SOLOSTAR 100 UNIT/ML injection pen after this

## 2024-07-17 NOTE — TELEPHONE ENCOUNTER
Please have him let me know what he learns, and if we need to change the medication, we can. Thanks

## 2024-07-22 DIAGNOSIS — F41.9 ANXIETY: ICD-10-CM

## 2024-07-22 RX ORDER — DIAZEPAM 10 MG/1
TABLET ORAL
Qty: 30 TABLET | Refills: 0 | Status: SHIPPED | OUTPATIENT
Start: 2024-07-22 | End: 2024-08-21

## 2024-07-23 ENCOUNTER — TELEPHONE (OUTPATIENT)
Dept: FAMILY MEDICINE CLINIC | Age: 69
End: 2024-07-23

## 2024-07-23 NOTE — TELEPHONE ENCOUNTER
His A1c is controlled at this time, so glipizide is okay without trulicity. We will continue follow with his glucose levels and make sure things stay controlled.

## 2024-07-23 NOTE — TELEPHONE ENCOUNTER
Patient called to find out the status of his trulicity. He found out he is in a donut hole for his insurance. He is out of the trulicity and went back to the glipizide that Valeria had prescribed to him. He took the first dose of the glipizide on Sunday. He wants to know if that's ok or should he try to get back on something else. He can't afford $400 so he wants to know is it safe to stay on the glipizide. Please call patient concerning this matter.    [phone] (home)

## 2024-08-05 RX ORDER — GLIPIZIDE 10 MG/1
TABLET, FILM COATED, EXTENDED RELEASE ORAL
Qty: 90 TABLET | Refills: 0 | Status: SHIPPED | OUTPATIENT
Start: 2024-08-05

## 2024-08-21 DIAGNOSIS — F41.9 ANXIETY: ICD-10-CM

## 2024-08-21 RX ORDER — FLUOXETINE HYDROCHLORIDE 20 MG/1
CAPSULE ORAL
Qty: 90 CAPSULE | Refills: 0 | Status: SHIPPED | OUTPATIENT
Start: 2024-08-21

## 2024-08-21 RX ORDER — DIAZEPAM 10 MG/1
TABLET ORAL
Qty: 30 TABLET | Refills: 0 | Status: SHIPPED | OUTPATIENT
Start: 2024-08-21 | End: 2024-09-20

## 2024-08-26 RX ORDER — IBUPROFEN 600 MG/1
TABLET, FILM COATED ORAL
Qty: 120 TABLET | Refills: 0 | Status: SHIPPED | OUTPATIENT
Start: 2024-08-26

## 2024-08-30 NOTE — TELEPHONE ENCOUNTER
Requested Prescriptions     Pending Prescriptions Disp Refills    varenicline (CHANTIX) 1 MG tablet [Pharmacy Med Name: Varenicline Tartrate 1 MG Oral Tablet] 60 tablet 0     Sig: Take 1 tablet by mouth twice daily          Last Office Visit: 6/13/2024     Next Office Visit: 9/12/2024

## 2024-09-03 RX ORDER — VARENICLINE TARTRATE 1 MG/1
TABLET, FILM COATED ORAL
Qty: 60 TABLET | Refills: 0 | Status: SHIPPED | OUTPATIENT
Start: 2024-09-03

## 2024-09-11 SDOH — ECONOMIC STABILITY: FOOD INSECURITY: WITHIN THE PAST 12 MONTHS, THE FOOD YOU BOUGHT JUST DIDN'T LAST AND YOU DIDN'T HAVE MONEY TO GET MORE.: SOMETIMES TRUE

## 2024-09-11 SDOH — ECONOMIC STABILITY: INCOME INSECURITY: HOW HARD IS IT FOR YOU TO PAY FOR THE VERY BASICS LIKE FOOD, HOUSING, MEDICAL CARE, AND HEATING?: SOMEWHAT HARD

## 2024-09-11 SDOH — ECONOMIC STABILITY: TRANSPORTATION INSECURITY
IN THE PAST 12 MONTHS, HAS LACK OF TRANSPORTATION KEPT YOU FROM MEETINGS, WORK, OR FROM GETTING THINGS NEEDED FOR DAILY LIVING?: NO

## 2024-09-11 SDOH — ECONOMIC STABILITY: FOOD INSECURITY: WITHIN THE PAST 12 MONTHS, YOU WORRIED THAT YOUR FOOD WOULD RUN OUT BEFORE YOU GOT MONEY TO BUY MORE.: NEVER TRUE

## 2024-09-12 ENCOUNTER — OFFICE VISIT (OUTPATIENT)
Dept: FAMILY MEDICINE CLINIC | Age: 69
End: 2024-09-12

## 2024-09-12 VITALS
OXYGEN SATURATION: 95 % | HEART RATE: 84 BPM | TEMPERATURE: 97.2 F | SYSTOLIC BLOOD PRESSURE: 142 MMHG | RESPIRATION RATE: 14 BRPM | DIASTOLIC BLOOD PRESSURE: 60 MMHG | WEIGHT: 196.4 LBS | BODY MASS INDEX: 26.64 KG/M2

## 2024-09-12 DIAGNOSIS — E11.9 TYPE 2 DIABETES MELLITUS WITHOUT COMPLICATION, UNSPECIFIED WHETHER LONG TERM INSULIN USE (HCC): Primary | ICD-10-CM

## 2024-09-12 DIAGNOSIS — F41.9 ANXIETY: ICD-10-CM

## 2024-09-12 DIAGNOSIS — F41.1 ANXIETY STATE: ICD-10-CM

## 2024-09-12 DIAGNOSIS — Z23 NEED FOR INFLUENZA VACCINATION: ICD-10-CM

## 2024-09-12 DIAGNOSIS — E11.9 TYPE 2 DIABETES MELLITUS WITHOUT COMPLICATION, UNSPECIFIED WHETHER LONG TERM INSULIN USE (HCC): ICD-10-CM

## 2024-09-12 LAB
CREAT UR-MCNC: 119 MG/DL (ref 39–259)
MICROALBUMIN UR DL<=1MG/L-MCNC: 76.3 MG/DL
MICROALBUMIN/CREAT UR: 641.2 MG/G (ref 0–30)

## 2024-09-12 RX ORDER — ATORVASTATIN CALCIUM 40 MG/1
40 TABLET, FILM COATED ORAL DAILY
Qty: 90 TABLET | Refills: 0 | Status: SHIPPED | OUTPATIENT
Start: 2024-09-12

## 2024-09-12 RX ORDER — DIAZEPAM 10 MG
TABLET ORAL
Qty: 30 TABLET | Refills: 0 | Status: CANCELLED | OUTPATIENT
Start: 2024-09-12 | End: 2024-10-12

## 2024-09-12 ASSESSMENT — ENCOUNTER SYMPTOMS
CONSTIPATION: 0
NAUSEA: 0
COUGH: 0
SHORTNESS OF BREATH: 0
RHINORRHEA: 0
SORE THROAT: 0
CHEST TIGHTNESS: 0
ABDOMINAL PAIN: 0
BACK PAIN: 0
EYE REDNESS: 0
COLOR CHANGE: 0
WHEEZING: 0
DIARRHEA: 0
BLOOD IN STOOL: 0
EYE ITCHING: 0
SINUS PRESSURE: 0
VOMITING: 0

## 2024-09-13 ENCOUNTER — TELEPHONE (OUTPATIENT)
Dept: FAMILY MEDICINE CLINIC | Age: 69
End: 2024-09-13

## 2024-09-13 DIAGNOSIS — E11.9 TYPE 2 DIABETES MELLITUS WITHOUT COMPLICATION, UNSPECIFIED WHETHER LONG TERM INSULIN USE (HCC): Primary | ICD-10-CM

## 2024-09-13 LAB
EST. AVERAGE GLUCOSE BLD GHB EST-MCNC: 248.9 MG/DL
HBA1C MFR BLD: 10.3 %

## 2024-09-17 RX ORDER — DAPAGLIFLOZIN 10 MG/1
10 TABLET, FILM COATED ORAL EVERY MORNING
Qty: 90 TABLET | Refills: 0 | Status: SHIPPED | OUTPATIENT
Start: 2024-09-17

## 2024-09-18 RX ORDER — INSULIN ASPART 100 [IU]/ML
5 INJECTION, SOLUTION INTRAVENOUS; SUBCUTANEOUS
Qty: 5 ADJUSTABLE DOSE PRE-FILLED PEN SYRINGE | Refills: 3 | Status: SHIPPED | OUTPATIENT
Start: 2024-09-18

## 2024-09-22 DIAGNOSIS — F41.9 ANXIETY: ICD-10-CM

## 2024-09-23 RX ORDER — DIAZEPAM 10 MG
TABLET ORAL
Qty: 30 TABLET | Refills: 0 | Status: SHIPPED | OUTPATIENT
Start: 2024-09-23 | End: 2024-10-23

## 2024-10-13 DIAGNOSIS — F41.9 ANXIETY: Primary | ICD-10-CM

## 2024-10-14 RX ORDER — FLUOXETINE 10 MG/1
CAPSULE ORAL
Qty: 90 CAPSULE | Refills: 0 | Status: SHIPPED | OUTPATIENT
Start: 2024-10-14 | End: 2024-10-16

## 2024-10-14 RX ORDER — LISINOPRIL 20 MG/1
TABLET ORAL
Qty: 90 TABLET | Refills: 0 | Status: SHIPPED | OUTPATIENT
Start: 2024-10-14 | End: 2024-10-16

## 2024-10-16 DIAGNOSIS — F41.9 ANXIETY: ICD-10-CM

## 2024-10-16 RX ORDER — FLUOXETINE 10 MG/1
CAPSULE ORAL
Qty: 90 CAPSULE | Refills: 1 | Status: SHIPPED | OUTPATIENT
Start: 2024-10-16

## 2024-10-16 RX ORDER — VARENICLINE TARTRATE 1 MG/1
TABLET, FILM COATED ORAL
Qty: 60 TABLET | Refills: 0 | Status: SHIPPED | OUTPATIENT
Start: 2024-10-16

## 2024-10-16 RX ORDER — LISINOPRIL 20 MG/1
TABLET ORAL
Qty: 90 TABLET | Refills: 1 | Status: SHIPPED | OUTPATIENT
Start: 2024-10-16

## 2024-10-16 NOTE — TELEPHONE ENCOUNTER
Last ov 9-12-24  Next ov 12-12-24    Requested Prescriptions     Pending Prescriptions Disp Refills    varenicline (CHANTIX) 1 MG tablet [Pharmacy Med Name: Varenicline Tartrate 1 MG Oral Tablet] 60 tablet 0     Sig: Take 1 tablet by mouth twice daily    FLUoxetine (PROZAC) 10 MG capsule [Pharmacy Med Name: FLUoxetine HCl 10 MG Oral Capsule] 90 capsule 0     Sig: Take 1 capsule by mouth once daily    lisinopril (PRINIVIL;ZESTRIL) 20 MG tablet [Pharmacy Med Name: Lisinopril 20 MG Oral Tablet] 90 tablet 0     Sig: Take 1 tablet by mouth once daily

## 2024-10-20 DIAGNOSIS — F41.9 ANXIETY: ICD-10-CM

## 2024-10-21 RX ORDER — DIAZEPAM 10 MG/1
TABLET ORAL
Qty: 30 TABLET | Refills: 0 | Status: SHIPPED | OUTPATIENT
Start: 2024-10-21 | End: 2024-11-20

## 2024-10-31 RX ORDER — GLIPIZIDE 10 MG/1
TABLET, FILM COATED, EXTENDED RELEASE ORAL
Qty: 90 TABLET | Refills: 0 | Status: SHIPPED | OUTPATIENT
Start: 2024-10-31

## 2024-11-01 NOTE — TELEPHONE ENCOUNTER
Medication:   Requested Prescriptions     Pending Prescriptions Disp Refills    ibuprofen (ADVIL;MOTRIN) 600 MG tablet [Pharmacy Med Name: Ibuprofen 600 MG Oral Tablet] 120 tablet 0     Sig: TAKE 1 TABLET BY MOUTH EVERY 6 HOURS WITH FOOD AS NEEDED FOR PAIN     Last Filled:      Last appt: 9/12/2024   Next appt: 12/12/2024    Last OARRS:       7/2/2019     2:59 PM   RX Monitoring   Periodic Controlled Substance Monitoring Possible medication side effects, risk of tolerance/dependence & alternative treatments discussed.

## 2024-11-04 RX ORDER — IBUPROFEN 600 MG/1
TABLET, FILM COATED ORAL
Qty: 120 TABLET | Refills: 0 | Status: SHIPPED | OUTPATIENT
Start: 2024-11-04

## 2024-11-13 RX ORDER — VARENICLINE TARTRATE 1 MG/1
TABLET, FILM COATED ORAL
Qty: 60 TABLET | Refills: 0 | Status: SHIPPED | OUTPATIENT
Start: 2024-11-13

## 2024-11-13 NOTE — TELEPHONE ENCOUNTER
Requested Prescriptions     Pending Prescriptions Disp Refills    varenicline (CHANTIX) 1 MG tablet [Pharmacy Med Name: Varenicline Tartrate 1 MG Oral Tablet] 60 tablet 0     Sig: Take 1 tablet by mouth twice daily          Last Office Visit: 9/12/2024     Next Office Visit: 12/12/2024

## 2024-11-18 DIAGNOSIS — F41.9 ANXIETY: ICD-10-CM

## 2024-11-18 RX ORDER — DIAZEPAM 10 MG/1
TABLET ORAL
Qty: 30 TABLET | Refills: 0 | Status: SHIPPED | OUTPATIENT
Start: 2024-11-18 | End: 2024-12-18

## 2024-12-08 DIAGNOSIS — E11.9 TYPE 2 DIABETES MELLITUS WITHOUT COMPLICATION, UNSPECIFIED WHETHER LONG TERM INSULIN USE (HCC): ICD-10-CM

## 2024-12-09 RX ORDER — ATORVASTATIN CALCIUM 40 MG/1
40 TABLET, FILM COATED ORAL DAILY
Qty: 90 TABLET | Refills: 0 | Status: SHIPPED | OUTPATIENT
Start: 2024-12-09

## 2024-12-09 NOTE — TELEPHONE ENCOUNTER
Medication:   Requested Prescriptions     Pending Prescriptions Disp Refills    atorvastatin (LIPITOR) 40 MG tablet [Pharmacy Med Name: Atorvastatin Calcium 40 MG Oral Tablet] 90 tablet 0     Sig: Take 1 tablet by mouth once daily        Last Filled:      Patient Phone Number: [phone] (home)     Last appt: 9/12/2024   Next appt: 12/12/2024    Last OARRS:       7/2/2019     2:59 PM   RX Monitoring   Periodic Controlled Substance Monitoring Possible medication side effects, risk of tolerance/dependence & alternative treatments discussed.

## 2024-12-12 ENCOUNTER — OFFICE VISIT (OUTPATIENT)
Dept: FAMILY MEDICINE CLINIC | Age: 69
End: 2024-12-12

## 2024-12-12 VITALS
DIASTOLIC BLOOD PRESSURE: 60 MMHG | BODY MASS INDEX: 26.75 KG/M2 | RESPIRATION RATE: 16 BRPM | HEART RATE: 79 BPM | TEMPERATURE: 97.2 F | WEIGHT: 197.2 LBS | SYSTOLIC BLOOD PRESSURE: 136 MMHG | OXYGEN SATURATION: 94 %

## 2024-12-12 DIAGNOSIS — E11.9 TYPE 2 DIABETES MELLITUS WITHOUT COMPLICATION, WITHOUT LONG-TERM CURRENT USE OF INSULIN (HCC): Primary | ICD-10-CM

## 2024-12-12 DIAGNOSIS — Z12.2 SCREENING FOR LUNG CANCER: ICD-10-CM

## 2024-12-12 DIAGNOSIS — F33.1 MODERATE EPISODE OF RECURRENT MAJOR DEPRESSIVE DISORDER (HCC): ICD-10-CM

## 2024-12-12 DIAGNOSIS — F41.9 ANXIETY: ICD-10-CM

## 2024-12-12 LAB — HBA1C MFR BLD: 10 %

## 2024-12-12 RX ORDER — DIAZEPAM 10 MG/1
TABLET ORAL
Qty: 30 TABLET | Refills: 0 | Status: SHIPPED | OUTPATIENT
Start: 2024-12-12 | End: 2025-01-11

## 2024-12-12 ASSESSMENT — ENCOUNTER SYMPTOMS
COUGH: 0
EYE ITCHING: 0
ABDOMINAL PAIN: 0
BLOOD IN STOOL: 0
SORE THROAT: 0
RHINORRHEA: 0
EYE REDNESS: 0
VOMITING: 0
NAUSEA: 0
DIARRHEA: 0
CHEST TIGHTNESS: 0
SINUS PRESSURE: 0
WHEEZING: 0
SHORTNESS OF BREATH: 0
BACK PAIN: 0
CONSTIPATION: 0
COLOR CHANGE: 0

## 2024-12-12 NOTE — ASSESSMENT & PLAN NOTE
This a chronic condition is not stable and controlled A1c is 10.5 today.  I would like him to increase his Humalog to 7 units 3 times daily continue Lantus 35 units nightly.  I am hopeful that once his insurance takes over January 1 we will be able to add SGLT2 therapy and have him continue to modify his nutrition to decrease carbs and sugar intake.

## 2024-12-12 NOTE — ASSESSMENT & PLAN NOTE
Depression is stable controlled at this time no changes in treatment and care.  Patient like to further discuss ADHD treatment I recommend he make another appointment to discuss this at a future time.

## 2024-12-12 NOTE — PROGRESS NOTES
Elliot Perkins (:  1955) is a 69 y.o. male,Established patient, here for evaluation of the following chief complaint(s):  3 Month Follow-Up      ASSESSMENT/PLAN:  1. Type 2 diabetes mellitus without complication, without long-term current use of insulin (HCC)  Assessment & Plan:  This a chronic condition is not stable and controlled A1c is 10.5 today.  I would like him to increase his Humalog to 7 units 3 times daily continue Lantus 35 units nightly.  I am hopeful that once his insurance takes over  we will be able to add SGLT2 therapy and have him continue to modify his nutrition to decrease carbs and sugar intake.    Orders:  -     POCT glycosylated hemoglobin (Hb A1C)  2. Screening for lung cancer  -     CT Lung Screen (Initial or Annual); Future  3. Anxiety  The following orders have not been finalized:  -     diazePAM (VALIUM) 10 MG tablet  4. Moderate episode of recurrent major depressive disorder (HCC)  Assessment & Plan:  Depression is stable controlled at this time no changes in treatment and care.  Patient like to further discuss ADHD treatment I recommend he make another appointment to discuss this at a future time.        No follow-ups on file.    SUBJECTIVE/OBJECTIVE:  Patient presenting for 3-month follow-up chronic medical conditions.  He has been taking his medications as prescribed.  Unfortunately his A1c remains elevated.  He was hopeful to start Jardiance or Farxiga however his insurance would not cover it and is unaffordable.  He states he is getting new insurance in  and he may ensure that these medications would be covered.  He is currently taking 35 units of Lantus nightly and 5 units of Humalog 3 times daily with meals.  He continues to use a aquiles today his blood sugar at the time is 160.  He states that he sees more greens and yellow's or rides.  He states he is trying to do his best with what he is eating to get things improved.  He also would like to discuss

## 2024-12-16 RX ORDER — VARENICLINE TARTRATE 1 MG/1
TABLET, FILM COATED ORAL
Qty: 60 TABLET | Refills: 0 | Status: SHIPPED | OUTPATIENT
Start: 2024-12-16

## 2024-12-16 NOTE — TELEPHONE ENCOUNTER
Medication:   Requested Prescriptions     Pending Prescriptions Disp Refills    varenicline (CHANTIX) 1 MG tablet [Pharmacy Med Name: Varenicline Tartrate 1 MG Oral Tablet] 60 tablet 0     Sig: Take 1 tablet by mouth twice daily        Last Filled:      Patient Phone Number: [phone] (home)     Last appt: 12/12/2024   Next appt: 12/19/2024    Last OARRS:       7/2/2019     2:59 PM   RX Monitoring   Periodic Controlled Substance Monitoring Possible medication side effects, risk of tolerance/dependence & alternative treatments discussed.

## 2024-12-17 DIAGNOSIS — E11.9 TYPE 2 DIABETES MELLITUS WITHOUT COMPLICATION, UNSPECIFIED WHETHER LONG TERM INSULIN USE (HCC): ICD-10-CM

## 2024-12-18 ENCOUNTER — TELEPHONE (OUTPATIENT)
Dept: CASE MANAGEMENT | Age: 69
End: 2024-12-18

## 2024-12-18 RX ORDER — INSULIN GLARGINE 100 [IU]/ML
INJECTION, SOLUTION SUBCUTANEOUS
Qty: 6 ML | Refills: 0 | Status: SHIPPED | OUTPATIENT
Start: 2024-12-18

## 2024-12-18 NOTE — TELEPHONE ENCOUNTER
Medication:   Requested Prescriptions     Pending Prescriptions Disp Refills    LANTUS SOLOSTAR 100 UNIT/ML injection pen [Pharmacy Med Name: Lantus SoloStar 100 UNIT/ML Subcutaneous Solution Pen-injector] 6 mL 0     Sig: INJECT 35 UNITS SUBCUTANEOUSLY NIGHTLY        Last Filled:      Patient Phone Number: [phone] (home)     Last appt: 12/12/2024   Next appt: 12/19/2024    Last OARRS:       7/2/2019     2:59 PM   RX Monitoring   Periodic Controlled Substance Monitoring Possible medication side effects, risk of tolerance/dependence & alternative treatments discussed.

## 2024-12-19 ENCOUNTER — OFFICE VISIT (OUTPATIENT)
Dept: FAMILY MEDICINE CLINIC | Age: 69
End: 2024-12-19

## 2024-12-19 VITALS
SYSTOLIC BLOOD PRESSURE: 110 MMHG | BODY MASS INDEX: 26.56 KG/M2 | OXYGEN SATURATION: 94 % | TEMPERATURE: 97 F | HEART RATE: 75 BPM | WEIGHT: 195.8 LBS | RESPIRATION RATE: 16 BRPM | DIASTOLIC BLOOD PRESSURE: 70 MMHG

## 2024-12-19 DIAGNOSIS — F90.1 ATTENTION DEFICIT HYPERACTIVITY DISORDER (ADHD), PREDOMINANTLY HYPERACTIVE TYPE: Primary | ICD-10-CM

## 2024-12-19 RX ORDER — DEXTROAMPHETAMINE SACCHARATE, AMPHETAMINE ASPARTATE MONOHYDRATE, DEXTROAMPHETAMINE SULFATE AND AMPHETAMINE SULFATE 2.5; 2.5; 2.5; 2.5 MG/1; MG/1; MG/1; MG/1
10 CAPSULE, EXTENDED RELEASE ORAL DAILY
Qty: 30 CAPSULE | Refills: 0 | Status: SHIPPED | OUTPATIENT
Start: 2024-12-19 | End: 2025-01-18

## 2024-12-19 ASSESSMENT — ENCOUNTER SYMPTOMS
BLOOD IN STOOL: 0
BACK PAIN: 0
RHINORRHEA: 0
WHEEZING: 0
EYE ITCHING: 0
VOMITING: 0
SINUS PRESSURE: 0
COLOR CHANGE: 0
SORE THROAT: 0
NAUSEA: 0
COUGH: 0
DIARRHEA: 0
CONSTIPATION: 0
CHEST TIGHTNESS: 0
ABDOMINAL PAIN: 0
SHORTNESS OF BREATH: 0
EYE REDNESS: 0

## 2024-12-19 NOTE — ASSESSMENT & PLAN NOTE
This is a new diagnosis for him.  He scored high on ADHD self exams and we will.  Will start him on Adderall 10 mg extended release daily.  He will try this for 1 month and then follow-up in the office to evaluate efficacy and side effects.

## 2024-12-19 NOTE — PROGRESS NOTES
Elliot Perkins (:  1955) is a 69 y.o. male,Established patient, here for evaluation of the following chief complaint(s):  Medication Check      ASSESSMENT/PLAN:  1. Attention deficit hyperactivity disorder (ADHD), predominantly hyperactive type  Assessment & Plan:  This is a new diagnosis for him.  He scored high on ADHD self exams and we will.  Will start him on Adderall 10 mg extended release daily.  He will try this for 1 month and then follow-up in the office to evaluate efficacy and side effects.    Orders:  -     amphetamine-dextroamphetamine (ADDERALL XR) 10 MG extended release capsule; Take 1 capsule by mouth daily for 30 days. Max Daily Amount: 10 mg, Disp-30 capsule, R-0Normal      No follow-ups on file.    SUBJECTIVE/OBJECTIVE:  Patient office today to discuss ADHD concerns.  He states as a child he was always disruptive had trouble focusing in school and often will just leave because he could not understand at work get bored.  He finds in his adult that he is has similar symptoms and it was brought to his attention that it is getting worse he is having increased anger and distractibility.  He was never formally diagnosed with ADHD or had testing in the past.    Current Outpatient Medications   Medication Sig Dispense Refill    amphetamine-dextroamphetamine (ADDERALL XR) 10 MG extended release capsule Take 1 capsule by mouth daily for 30 days. Max Daily Amount: 10 mg 30 capsule 0    LANTUS SOLOSTAR 100 UNIT/ML injection pen INJECT 35 UNITS SUBCUTANEOUSLY NIGHTLY 6 mL 0    varenicline (CHANTIX) 1 MG tablet Take 1 tablet by mouth twice daily 60 tablet 0    diazePAM (VALIUM) 10 MG tablet TAKE 1 TABLET BY MOUTH ONCE DAILY AS NEEDED FOR ANXIETY 30 tablet 0    atorvastatin (LIPITOR) 40 MG tablet Take 1 tablet by mouth once daily 90 tablet 0    FLUoxetine (PROZAC) 20 MG capsule Take 1 capsule by mouth once daily 90 capsule 0    ibuprofen (ADVIL;MOTRIN) 600 MG tablet TAKE 1 TABLET BY MOUTH EVERY 6

## 2025-01-13 RX ORDER — VARENICLINE TARTRATE 1 MG/1
TABLET, FILM COATED ORAL
Qty: 60 TABLET | Refills: 0 | Status: SHIPPED | OUTPATIENT
Start: 2025-01-13 | End: 2025-01-14 | Stop reason: SDUPTHER

## 2025-01-14 ENCOUNTER — TELEPHONE (OUTPATIENT)
Dept: FAMILY MEDICINE CLINIC | Age: 70
End: 2025-01-14

## 2025-01-14 DIAGNOSIS — F90.1 ATTENTION DEFICIT HYPERACTIVITY DISORDER (ADHD), PREDOMINANTLY HYPERACTIVE TYPE: ICD-10-CM

## 2025-01-14 RX ORDER — VARENICLINE TARTRATE 1 MG/1
1 TABLET, FILM COATED ORAL 2 TIMES DAILY
Qty: 60 TABLET | Refills: 0 | Status: SHIPPED | OUTPATIENT
Start: 2025-01-14

## 2025-01-14 RX ORDER — DEXTROAMPHETAMINE SACCHARATE, AMPHETAMINE ASPARTATE MONOHYDRATE, DEXTROAMPHETAMINE SULFATE AND AMPHETAMINE SULFATE 2.5; 2.5; 2.5; 2.5 MG/1; MG/1; MG/1; MG/1
10 CAPSULE, EXTENDED RELEASE ORAL DAILY
Qty: 30 CAPSULE | Refills: 0 | Status: SHIPPED | OUTPATIENT
Start: 2025-01-14 | End: 2025-02-13

## 2025-01-14 RX ORDER — LISINOPRIL 20 MG/1
20 TABLET ORAL DAILY
Qty: 90 TABLET | Refills: 1 | Status: SHIPPED | OUTPATIENT
Start: 2025-01-14

## 2025-01-14 RX ORDER — VARENICLINE TARTRATE 1 MG/1
TABLET, FILM COATED ORAL
Qty: 60 TABLET | Refills: 0 | OUTPATIENT
Start: 2025-01-14

## 2025-01-18 ENCOUNTER — HOSPITAL ENCOUNTER (OUTPATIENT)
Dept: CT IMAGING | Age: 70
Discharge: HOME | End: 2025-01-18
Payer: MEDICARE

## 2025-01-18 DIAGNOSIS — Z12.2 SCREENING FOR LUNG CANCER: ICD-10-CM

## 2025-01-18 PROCEDURE — 71271 CT THORAX LUNG CANCER SCR C-: CPT

## 2025-01-20 ENCOUNTER — OFFICE VISIT (OUTPATIENT)
Dept: FAMILY MEDICINE CLINIC | Age: 70
End: 2025-01-20

## 2025-01-20 VITALS
RESPIRATION RATE: 16 BRPM | BODY MASS INDEX: 27.18 KG/M2 | OXYGEN SATURATION: 94 % | TEMPERATURE: 96.8 F | WEIGHT: 200.4 LBS | HEART RATE: 83 BPM | SYSTOLIC BLOOD PRESSURE: 138 MMHG | DIASTOLIC BLOOD PRESSURE: 70 MMHG

## 2025-01-20 DIAGNOSIS — Z79.4 TYPE 2 DIABETES MELLITUS WITH OTHER SPECIFIED COMPLICATION, WITH LONG-TERM CURRENT USE OF INSULIN (HCC): ICD-10-CM

## 2025-01-20 DIAGNOSIS — F33.1 MODERATE EPISODE OF RECURRENT MAJOR DEPRESSIVE DISORDER (HCC): Primary | ICD-10-CM

## 2025-01-20 DIAGNOSIS — F90.1 ATTENTION DEFICIT HYPERACTIVITY DISORDER (ADHD), PREDOMINANTLY HYPERACTIVE TYPE: ICD-10-CM

## 2025-01-20 DIAGNOSIS — E11.69 TYPE 2 DIABETES MELLITUS WITH OTHER SPECIFIED COMPLICATION, WITH LONG-TERM CURRENT USE OF INSULIN (HCC): ICD-10-CM

## 2025-01-20 DIAGNOSIS — Z79.4 TYPE 2 DIABETES MELLITUS WITHOUT COMPLICATION, WITH LONG-TERM CURRENT USE OF INSULIN (HCC): ICD-10-CM

## 2025-01-20 DIAGNOSIS — F41.9 ANXIETY: ICD-10-CM

## 2025-01-20 DIAGNOSIS — E11.9 TYPE 2 DIABETES MELLITUS WITHOUT COMPLICATION, WITH LONG-TERM CURRENT USE OF INSULIN (HCC): ICD-10-CM

## 2025-01-20 SDOH — ECONOMIC STABILITY: FOOD INSECURITY: WITHIN THE PAST 12 MONTHS, YOU WORRIED THAT YOUR FOOD WOULD RUN OUT BEFORE YOU GOT MONEY TO BUY MORE.: NEVER TRUE

## 2025-01-20 SDOH — ECONOMIC STABILITY: FOOD INSECURITY: WITHIN THE PAST 12 MONTHS, THE FOOD YOU BOUGHT JUST DIDN'T LAST AND YOU DIDN'T HAVE MONEY TO GET MORE.: NEVER TRUE

## 2025-01-20 ASSESSMENT — PATIENT HEALTH QUESTIONNAIRE - PHQ9
SUM OF ALL RESPONSES TO PHQ QUESTIONS 1-9: 0
10. IF YOU CHECKED OFF ANY PROBLEMS, HOW DIFFICULT HAVE THESE PROBLEMS MADE IT FOR YOU TO DO YOUR WORK, TAKE CARE OF THINGS AT HOME, OR GET ALONG WITH OTHER PEOPLE: NOT DIFFICULT AT ALL
SUM OF ALL RESPONSES TO PHQ9 QUESTIONS 1 & 2: 0
SUM OF ALL RESPONSES TO PHQ QUESTIONS 1-9: 0
SUM OF ALL RESPONSES TO PHQ QUESTIONS 1-9: 0
3. TROUBLE FALLING OR STAYING ASLEEP: NOT AT ALL
2. FEELING DOWN, DEPRESSED OR HOPELESS: NOT AT ALL
SUM OF ALL RESPONSES TO PHQ QUESTIONS 1-9: 0
5. POOR APPETITE OR OVEREATING: NOT AT ALL
8. MOVING OR SPEAKING SO SLOWLY THAT OTHER PEOPLE COULD HAVE NOTICED. OR THE OPPOSITE, BEING SO FIGETY OR RESTLESS THAT YOU HAVE BEEN MOVING AROUND A LOT MORE THAN USUAL: NOT AT ALL
7. TROUBLE CONCENTRATING ON THINGS, SUCH AS READING THE NEWSPAPER OR WATCHING TELEVISION: NOT AT ALL
4. FEELING TIRED OR HAVING LITTLE ENERGY: NOT AT ALL
1. LITTLE INTEREST OR PLEASURE IN DOING THINGS: NOT AT ALL
6. FEELING BAD ABOUT YOURSELF - OR THAT YOU ARE A FAILURE OR HAVE LET YOURSELF OR YOUR FAMILY DOWN: NOT AT ALL
9. THOUGHTS THAT YOU WOULD BE BETTER OFF DEAD, OR OF HURTING YOURSELF: NOT AT ALL

## 2025-01-20 ASSESSMENT — ENCOUNTER SYMPTOMS
CHEST TIGHTNESS: 0
BACK PAIN: 0
COUGH: 0
CONSTIPATION: 0
COLOR CHANGE: 0
RHINORRHEA: 0
WHEEZING: 0
DIARRHEA: 0
SHORTNESS OF BREATH: 0
SORE THROAT: 0
NAUSEA: 0
ABDOMINAL PAIN: 0
BLOOD IN STOOL: 0
SINUS PRESSURE: 0
EYE REDNESS: 0
EYE ITCHING: 0
VOMITING: 0

## 2025-01-20 NOTE — PROGRESS NOTES
Elliot Perkins (:  1955) is a 69 y.o. male,Established patient, here for evaluation of the following chief complaint(s):  1 Month Follow-Up      ASSESSMENT/PLAN:  1. Moderate episode of recurrent major depressive disorder (HCC)  Assessment & Plan:  Is a chronic condition that is stable controlled at this time he is experiencing benefit on treatment plan no changes indicated.    2. Type 2 diabetes mellitus with other specified complication, with long-term current use of insulin (HCC)  Assessment & Plan:  As below this is a chronic condition that is not stable and controlled insurance remains a barrier due for blood work and repeat labs at next visit.    3. Type 2 diabetes mellitus without complication, with long-term current use of insulin (HCC)  Assessment & Plan:  Is a chronic condition and is not stable and controlled.  Insurance has been difficult for him therefore affordability of medications as a barrier.  He is trying to work on nutrition and making good habits.  He is due for blood work at his next visit that he is will be addressed and adjustment to medication discussed at that time if indicated.    4. Attention deficit hyperactivity disorder (ADHD), predominantly hyperactive type  Assessment & Plan:  This is a chronic condition that is not stable controlled at this time however patient is not ready to make any changes to his medication treatment.  He we will continue on current dose of Adderall and plan to follow-up in 1 to 2 months with side effects and efficacy.  I did discuss with patient that it may take additional dosing of medication to see efficacy however he is not interested in increasing dose at this time.        No follow-ups on file.    SUBJECTIVE/OBJECTIVE:  Patient the office today for 1 month follow-up after being started on 10 mg extended release Adderall.  He states that he has not noticed much of a difference in the past month.  He has not had any adverse reactions either.  He

## 2025-01-20 NOTE — ASSESSMENT & PLAN NOTE
Is a chronic condition that is stable controlled at this time he is experiencing benefit on treatment plan no changes indicated.

## 2025-01-20 NOTE — TELEPHONE ENCOUNTER
Medication:   Requested Prescriptions     Pending Prescriptions Disp Refills    diazePAM (VALIUM) 10 MG tablet [Pharmacy Med Name: diazePAM 10 MG Oral Tablet] 30 tablet 0     Sig: TAKE 1 TABLET BY MOUTH ONCE DAILY AS NEEDED FOR ANXIETY    ibuprofen (ADVIL;MOTRIN) 600 MG tablet [Pharmacy Med Name: Ibuprofen 600 MG Oral Tablet] 120 tablet 0     Sig: TAKE 1 TABLET BY MOUTH EVERY 6 HOURS WITH FOOD AS NEEDED FOR PAIN        Last Filled:      Patient Phone Number: [phone] (home)     Last appt: 1/20/2025   Next appt: 3/12/2025    Last OARRS:       7/2/2019     2:59 PM   RX Monitoring   Periodic Controlled Substance Monitoring Possible medication side effects, risk of tolerance/dependence & alternative treatments discussed.

## 2025-01-20 NOTE — ASSESSMENT & PLAN NOTE
Is a chronic condition and is not stable and controlled.  Insurance has been difficult for him therefore affordability of medications as a barrier.  He is trying to work on nutrition and making good habits.  He is due for blood work at his next visit that he is will be addressed and adjustment to medication discussed at that time if indicated.

## 2025-01-20 NOTE — ASSESSMENT & PLAN NOTE
This is a chronic condition that is not stable controlled at this time however patient is not ready to make any changes to his medication treatment.  He we will continue on current dose of Adderall and plan to follow-up in 1 to 2 months with side effects and efficacy.  I did discuss with patient that it may take additional dosing of medication to see efficacy however he is not interested in increasing dose at this time.

## 2025-01-20 NOTE — ASSESSMENT & PLAN NOTE
As below this is a chronic condition that is not stable and controlled insurance remains a barrier due for blood work and repeat labs at next visit.

## 2025-01-21 RX ORDER — DIAZEPAM 10 MG/1
TABLET ORAL
Qty: 30 TABLET | Refills: 0 | Status: SHIPPED | OUTPATIENT
Start: 2025-01-21 | End: 2025-02-19

## 2025-01-21 RX ORDER — IBUPROFEN 600 MG/1
TABLET, FILM COATED ORAL
Qty: 120 TABLET | Refills: 0 | Status: SHIPPED | OUTPATIENT
Start: 2025-01-21

## 2025-01-27 ENCOUNTER — TELEPHONE (OUTPATIENT)
Dept: FAMILY MEDICINE CLINIC | Age: 70
End: 2025-01-27

## 2025-01-27 NOTE — TELEPHONE ENCOUNTER
Patient received letter from [employer] regarding his adderall xr. He called them- they will not approve him to take this because it needs a PA- this has been requested

## 2025-01-27 NOTE — TELEPHONE ENCOUNTER
Elliot wants you(Bella) to call him at home. He woul    [phone]   d not speak with a medical assistant. He said it is urgent!..    [phone]

## 2025-01-28 NOTE — TELEPHONE ENCOUNTER
Patient received another yesterday for Chantix (varenicline) from [place] for PA. He is asking Bella to send this as well.

## 2025-01-28 NOTE — TELEPHONE ENCOUNTER
Submitted PA for Amphetamine-Dextroamphet ER 10MG er capsules  Via CMM Key: [id number] STATUS: Available without authorization. The member is able to fill the requested drug at the pharmacy.      I called [facility] and spoke to Chinyere.  Chinyere said that they got a paid claim for Amphetamine-Dextroamphet ER 10MG er capsules, that patient had a $8.10 copay, and he picked up the medication.  Chinyere said that the patient also picked up the Varenicline.

## 2025-02-03 RX ORDER — GLIPIZIDE 10 MG/1
TABLET, FILM COATED, EXTENDED RELEASE ORAL
Qty: 90 TABLET | Refills: 0 | Status: SHIPPED | OUTPATIENT
Start: 2025-02-03

## 2025-02-03 NOTE — TELEPHONE ENCOUNTER
Medication:   Requested Prescriptions     Pending Prescriptions Disp Refills    glipiZIDE (GLUCOTROL XL) 10 MG extended release tablet [Pharmacy Med Name: glipiZIDE ER 10 MG Oral Tablet Extended Release 24 Hour] 90 tablet 0     Sig: Take 1 tablet by mouth once daily        Last Filled:      Patient Phone Number: [phone] (home)     Last appt: 1/20/2025   Next appt: 3/12/2025    Last OARRS:       7/2/2019     2:59 PM   RX Monitoring   Periodic Controlled Substance Monitoring Possible medication side effects, risk of tolerance/dependence & alternative treatments discussed.

## 2025-02-04 ENCOUNTER — TELEPHONE (OUTPATIENT)
Dept: FAMILY MEDICINE CLINIC | Age: 70
End: 2025-02-04

## 2025-02-04 NOTE — TELEPHONE ENCOUNTER
Patient would like to speak with Bella concerning notices he is getting from insurance provider. He only wants to speak to her...    [phone] (home)

## 2025-02-06 ENCOUNTER — TELEPHONE (OUTPATIENT)
Dept: FAMILY MEDICINE CLINIC | Age: 70
End: 2025-02-06

## 2025-02-06 NOTE — TELEPHONE ENCOUNTER
Prior authorization for medication AMPHET/DEXTR  MG  has been DENIED by insurance [employer]  due to THE DRUG IS NOT ON LIST OF COVERED DRUGS. Please advise on appeal or change in medications. Please see attached scanned denial letter for more information.

## 2025-02-10 ENCOUNTER — TELEPHONE (OUTPATIENT)
Dept: FAMILY MEDICINE CLINIC | Age: 70
End: 2025-02-10

## 2025-02-10 DIAGNOSIS — Z79.4 TYPE 2 DIABETES MELLITUS WITH OTHER SPECIFIED COMPLICATION, WITH LONG-TERM CURRENT USE OF INSULIN (HCC): Primary | ICD-10-CM

## 2025-02-10 DIAGNOSIS — E11.69 TYPE 2 DIABETES MELLITUS WITH OTHER SPECIFIED COMPLICATION, WITH LONG-TERM CURRENT USE OF INSULIN (HCC): Primary | ICD-10-CM

## 2025-02-10 NOTE — TELEPHONE ENCOUNTER
Called [employer] and spoke to Terri HERNANDEZ.  There were NO PA DENIALS for these medications.  Patient was getting transitional fills.  Now the medications need PAs done.    Submitted PA for Amphetamine-Dextroamphet ER 10MG er capsules, via Phone Call to [employer]. Reference # [id number] Status: APPROVED 01/01/25-02/10/26    Submitted PA for Varenicline Tartrate 1MG tablets, via Phone Call to [employer]. Reference # [id number] Status: APPROVED 01/01/25-05/11/25    Please advise the pharmacy.  Thank you!

## 2025-02-14 DIAGNOSIS — F90.1 ATTENTION DEFICIT HYPERACTIVITY DISORDER (ADHD), PREDOMINANTLY HYPERACTIVE TYPE: ICD-10-CM

## 2025-02-17 RX ORDER — DEXTROAMPHETAMINE SACCHARATE, AMPHETAMINE ASPARTATE MONOHYDRATE, DEXTROAMPHETAMINE SULFATE AND AMPHETAMINE SULFATE 2.5; 2.5; 2.5; 2.5 MG/1; MG/1; MG/1; MG/1
10 CAPSULE, EXTENDED RELEASE ORAL DAILY
Qty: 30 CAPSULE | Refills: 0 | Status: SHIPPED | OUTPATIENT
Start: 2025-02-17 | End: 2025-03-19

## 2025-02-18 DIAGNOSIS — F41.9 ANXIETY: ICD-10-CM

## 2025-02-18 RX ORDER — DIAZEPAM 10 MG/1
TABLET ORAL
Qty: 30 TABLET | Refills: 0 | Status: SHIPPED | OUTPATIENT
Start: 2025-02-18 | End: 2025-03-20

## 2025-02-18 NOTE — TELEPHONE ENCOUNTER
Medication:   Requested Prescriptions     Pending Prescriptions Disp Refills    diazePAM (VALIUM) 10 MG tablet [Pharmacy Med Name: diazePAM 10 MG Oral Tablet] 30 tablet 0     Sig: TAKE 1 TABLET BY MOUTH ONCE DAILY AS NEEDED FOR ANXIETY     Last Filled:      Last appt: 1/20/2025   Next appt: 3/12/2025    Last OARRS:       7/2/2019     2:59 PM   RX Monitoring   Periodic Controlled Substance Monitoring Possible medication side effects, risk of tolerance/dependence & alternative treatments discussed.

## 2025-03-09 DIAGNOSIS — E11.9 TYPE 2 DIABETES MELLITUS WITHOUT COMPLICATION, UNSPECIFIED WHETHER LONG TERM INSULIN USE (HCC): ICD-10-CM

## 2025-03-10 RX ORDER — ATORVASTATIN CALCIUM 40 MG/1
40 TABLET, FILM COATED ORAL DAILY
Qty: 90 TABLET | Refills: 0 | Status: SHIPPED | OUTPATIENT
Start: 2025-03-10 | End: 2025-03-12 | Stop reason: SDUPTHER

## 2025-03-10 NOTE — TELEPHONE ENCOUNTER
Medication:   Requested Prescriptions     Pending Prescriptions Disp Refills    atorvastatin (LIPITOR) 40 MG tablet [Pharmacy Med Name: Atorvastatin Calcium 40 MG Oral Tablet] 90 tablet 0     Sig: Take 1 tablet by mouth once daily        Last Filled:      Patient Phone Number: [phone] (home)     Last appt: 1/20/2025   Next appt: 3/12/2025    Last OARRS:       7/2/2019     2:59 PM   RX Monitoring   Periodic Controlled Substance Monitoring Possible medication side effects, risk of tolerance/dependence & alternative treatments discussed.

## 2025-03-11 RX ORDER — VARENICLINE TARTRATE 1 MG/1
1 TABLET, FILM COATED ORAL 2 TIMES DAILY
Qty: 60 TABLET | Refills: 0 | Status: SHIPPED | OUTPATIENT
Start: 2025-03-11

## 2025-03-11 NOTE — TELEPHONE ENCOUNTER
Medication:   Requested Prescriptions     Pending Prescriptions Disp Refills    varenicline (CHANTIX) 1 MG tablet [Pharmacy Med Name: Varenicline Tartrate 1 MG Oral Tablet] 60 tablet 0     Sig: Take 1 tablet by mouth twice daily     Last Filled:      Last appt: 1/20/2025   Next appt: 3/12/2025

## 2025-03-12 ENCOUNTER — OFFICE VISIT (OUTPATIENT)
Dept: FAMILY MEDICINE CLINIC | Age: 70
End: 2025-03-12
Payer: MEDICARE

## 2025-03-12 VITALS
DIASTOLIC BLOOD PRESSURE: 72 MMHG | BODY MASS INDEX: 26.72 KG/M2 | SYSTOLIC BLOOD PRESSURE: 134 MMHG | RESPIRATION RATE: 16 BRPM | WEIGHT: 197 LBS | OXYGEN SATURATION: 98 % | TEMPERATURE: 96.8 F | HEART RATE: 72 BPM

## 2025-03-12 DIAGNOSIS — F33.1 MODERATE EPISODE OF RECURRENT MAJOR DEPRESSIVE DISORDER (HCC): ICD-10-CM

## 2025-03-12 DIAGNOSIS — E11.9 TYPE 2 DIABETES MELLITUS WITHOUT COMPLICATION, WITHOUT LONG-TERM CURRENT USE OF INSULIN: ICD-10-CM

## 2025-03-12 DIAGNOSIS — F90.1 ATTENTION DEFICIT HYPERACTIVITY DISORDER (ADHD), PREDOMINANTLY HYPERACTIVE TYPE: ICD-10-CM

## 2025-03-12 DIAGNOSIS — E11.9 TYPE 2 DIABETES MELLITUS WITHOUT COMPLICATION, UNSPECIFIED WHETHER LONG TERM INSULIN USE (HCC): ICD-10-CM

## 2025-03-12 DIAGNOSIS — E11.9 TYPE 2 DIABETES MELLITUS WITHOUT COMPLICATION, WITHOUT LONG-TERM CURRENT USE OF INSULIN (HCC): Primary | ICD-10-CM

## 2025-03-12 LAB
25(OH)D3 SERPL-MCNC: 26.3 NG/ML
ALBUMIN SERPL-MCNC: 4.5 G/DL (ref 3.4–5)
ALBUMIN/GLOB SERPL: 1.5 {RATIO} (ref 1.1–2.2)
ALP SERPL-CCNC: 124 U/L (ref 40–129)
ALT SERPL-CCNC: 46 U/L (ref 10–40)
ANION GAP SERPL CALCULATED.3IONS-SCNC: 13 MMOL/L (ref 3–16)
AST SERPL-CCNC: 30 U/L (ref 15–37)
BILIRUB SERPL-MCNC: 0.6 MG/DL (ref 0–1)
BUN SERPL-MCNC: 12 MG/DL (ref 7–20)
CALCIUM SERPL-MCNC: 10.4 MG/DL (ref 8.3–10.6)
CHLORIDE SERPL-SCNC: 94 MMOL/L (ref 99–110)
CHOLEST SERPL-MCNC: 144 MG/DL (ref 0–199)
CO2 SERPL-SCNC: 27 MMOL/L (ref 21–32)
CREAT SERPL-MCNC: 0.8 MG/DL (ref 0.8–1.3)
CREAT UR-MCNC: 272 MG/DL (ref 39–259)
GFR SERPLBLD CREATININE-BSD FMLA CKD-EPI: >90 ML/MIN/{1.73_M2}
GLUCOSE SERPL-MCNC: 217 MG/DL (ref 70–99)
HBA1C MFR BLD: 9.3 %
HDLC SERPL-MCNC: 48 MG/DL (ref 40–60)
LDL CHOLESTEROL: 42 MG/DL
MICROALBUMIN UR DL<=1MG/L-MCNC: 307 MG/DL
MICROALBUMIN/CREAT UR: 1128.7 MG/G (ref 0–30)
POTASSIUM SERPL-SCNC: 4.9 MMOL/L (ref 3.5–5.1)
PROT SERPL-MCNC: 7.5 G/DL (ref 6.4–8.2)
PSA SERPL DL<=0.01 NG/ML-MCNC: 0.85 NG/ML (ref 0–4)
SODIUM SERPL-SCNC: 134 MMOL/L (ref 136–145)
TRIGL SERPL-MCNC: 268 MG/DL (ref 0–150)
TSH SERPL DL<=0.005 MIU/L-ACNC: 1.59 UIU/ML (ref 0.27–4.2)
VLDLC SERPL CALC-MCNC: 54 MG/DL

## 2025-03-12 PROCEDURE — 1123F ACP DISCUSS/DSCN MKR DOCD: CPT | Performed by: NURSE PRACTITIONER

## 2025-03-12 PROCEDURE — 99214 OFFICE O/P EST MOD 30 MIN: CPT | Performed by: NURSE PRACTITIONER

## 2025-03-12 PROCEDURE — 83036 HEMOGLOBIN GLYCOSYLATED A1C: CPT | Performed by: NURSE PRACTITIONER

## 2025-03-12 PROCEDURE — 3078F DIAST BP <80 MM HG: CPT | Performed by: NURSE PRACTITIONER

## 2025-03-12 PROCEDURE — 3046F HEMOGLOBIN A1C LEVEL >9.0%: CPT | Performed by: NURSE PRACTITIONER

## 2025-03-12 PROCEDURE — G2211 COMPLEX E/M VISIT ADD ON: HCPCS | Performed by: NURSE PRACTITIONER

## 2025-03-12 PROCEDURE — 1159F MED LIST DOCD IN RCRD: CPT | Performed by: NURSE PRACTITIONER

## 2025-03-12 PROCEDURE — 3075F SYST BP GE 130 - 139MM HG: CPT | Performed by: NURSE PRACTITIONER

## 2025-03-12 RX ORDER — INSULIN GLARGINE 100 [IU]/ML
INJECTION, SOLUTION SUBCUTANEOUS
Qty: 15 ML | Refills: 0 | Status: SHIPPED | OUTPATIENT
Start: 2025-03-12

## 2025-03-12 RX ORDER — ATORVASTATIN CALCIUM 40 MG/1
40 TABLET, FILM COATED ORAL DAILY
Qty: 90 TABLET | Refills: 3 | Status: SHIPPED | OUTPATIENT
Start: 2025-03-12

## 2025-03-12 RX ORDER — DEXTROAMPHETAMINE SACCHARATE, AMPHETAMINE ASPARTATE MONOHYDRATE, DEXTROAMPHETAMINE SULFATE AND AMPHETAMINE SULFATE 2.5; 2.5; 2.5; 2.5 MG/1; MG/1; MG/1; MG/1
20 CAPSULE, EXTENDED RELEASE ORAL DAILY
Qty: 60 CAPSULE | Refills: 0 | Status: SHIPPED | OUTPATIENT
Start: 2025-03-12 | End: 2025-04-11

## 2025-03-12 ASSESSMENT — ENCOUNTER SYMPTOMS
ABDOMINAL PAIN: 0
COLOR CHANGE: 0
CONSTIPATION: 0
SORE THROAT: 0
WHEEZING: 0
BACK PAIN: 0
COUGH: 0
EYE REDNESS: 0
SHORTNESS OF BREATH: 0
CHEST TIGHTNESS: 0
EYE ITCHING: 0
VOMITING: 0
DIARRHEA: 0
NAUSEA: 0
BLOOD IN STOOL: 0
RHINORRHEA: 0
SINUS PRESSURE: 0

## 2025-03-12 NOTE — ASSESSMENT & PLAN NOTE
His A1c levels have shown a significant decrease from 10 to 9.3. The Lantus dosage will be adjusted to 15 units in the morning and 35 units in the evening. This change aims to simplify his regimen and improve compliance.

## 2025-03-12 NOTE — ASSESSMENT & PLAN NOTE
The Adderall dosage will be increased to 20 mg, to be taken as two 10 mg tablets in the morning. This adjustment is made to enhance the therapeutic effect as the current dose was not effective.

## 2025-03-13 ENCOUNTER — RESULTS FOLLOW-UP (OUTPATIENT)
Dept: FAMILY MEDICINE CLINIC | Age: 70
End: 2025-03-13

## 2025-03-13 LAB
DEPRECATED RDW RBC AUTO: 13.7 % (ref 12.4–15.4)
HCT VFR BLD AUTO: 49.3 % (ref 40.5–52.5)
HGB BLD-MCNC: 17.1 G/DL (ref 13.5–17.5)
MCH RBC QN AUTO: 32.5 PG (ref 26–34)
MCHC RBC AUTO-ENTMCNC: 34.7 G/DL (ref 31–36)
MCV RBC AUTO: 93.7 FL (ref 80–100)
PLATELET # BLD AUTO: 263 K/UL (ref 135–450)
PMV BLD AUTO: 8.2 FL (ref 5–10.5)
RBC # BLD AUTO: 5.26 M/UL (ref 4.2–5.9)
WBC # BLD AUTO: 7.4 K/UL (ref 4–11)

## 2025-03-17 ENCOUNTER — TELEPHONE (OUTPATIENT)
Dept: FAMILY MEDICINE CLINIC | Age: 70
End: 2025-03-17

## 2025-03-17 NOTE — TELEPHONE ENCOUNTER
Pt would like to speak to Bella regarding his Lantus and Novolog . Pt has not picked up Lantus yet as he still has Novolog to take

## 2025-03-19 ENCOUNTER — TELEPHONE (OUTPATIENT)
Dept: FAMILY MEDICINE CLINIC | Age: 70
End: 2025-03-19

## 2025-03-19 DIAGNOSIS — F41.9 ANXIETY: ICD-10-CM

## 2025-03-19 RX ORDER — DIAZEPAM 10 MG/1
TABLET ORAL
Qty: 30 TABLET | Refills: 0 | Status: SHIPPED | OUTPATIENT
Start: 2025-03-19 | End: 2025-04-18

## 2025-03-19 RX ORDER — IBUPROFEN 600 MG/1
TABLET, FILM COATED ORAL
Qty: 120 TABLET | Refills: 0 | Status: SHIPPED | OUTPATIENT
Start: 2025-03-19

## 2025-03-19 NOTE — TELEPHONE ENCOUNTER
Medication:   Requested Prescriptions     Pending Prescriptions Disp Refills    diazePAM (VALIUM) 10 MG tablet [Pharmacy Med Name: diazePAM 10 MG Oral Tablet] 30 tablet 0     Sig: TAKE 1 TABLET BY MOUTH ONCE DAILY AS NEEDED FOR ANXIETY    ibuprofen (ADVIL;MOTRIN) 600 MG tablet [Pharmacy Med Name: Ibuprofen 600 MG Oral Tablet] 120 tablet 0     Sig: TAKE 1 TABLET BY MOUTH EVERY 6 HOURS WITH FOOD AS NEEDED FOR PAIN     Last Filled:  Valium #30 on 02/18/2025 and IBU #120 on 01/21/25    Last appt: 3/12/2025   Next appt: 5/12/2025    Last OARRS:       7/2/2019     2:59 PM   RX Monitoring   Periodic Controlled Substance Monitoring Possible medication side effects, risk of tolerance/dependence & alternative treatments discussed.

## 2025-03-19 NOTE — TELEPHONE ENCOUNTER
Patient would like to speak directly with Bella. He thinks he missed a call earlier this week.     Wants to speak about his diabetes medication.

## 2025-04-14 DIAGNOSIS — F90.1 ATTENTION DEFICIT HYPERACTIVITY DISORDER (ADHD), PREDOMINANTLY HYPERACTIVE TYPE: ICD-10-CM

## 2025-04-14 RX ORDER — DEXTROAMPHETAMINE SACCHARATE, AMPHETAMINE ASPARTATE MONOHYDRATE, DEXTROAMPHETAMINE SULFATE AND AMPHETAMINE SULFATE 2.5; 2.5; 2.5; 2.5 MG/1; MG/1; MG/1; MG/1
20 CAPSULE, EXTENDED RELEASE ORAL DAILY
Qty: 60 CAPSULE | Refills: 0 | Status: SHIPPED | OUTPATIENT
Start: 2025-04-14 | End: 2025-05-14

## 2025-04-14 NOTE — TELEPHONE ENCOUNTER
Medication:   Requested Prescriptions     Pending Prescriptions Disp Refills    amphetamine-dextroamphetamine (ADDERALL XR) 10 MG extended release capsule 60 capsule 0     Sig: Take 2 capsules by mouth daily for 30 days. Max Daily Amount: 20 mg        Last Filled:      Patient Phone Number: [phone] (home)     Last appt: 3/12/2025   Next appt: 5/12/2025    Last OARRS:       7/2/2019     2:59 PM   RX Monitoring   Periodic Controlled Substance Monitoring Possible medication side effects, risk of tolerance/dependence & alternative treatments discussed.

## 2025-04-15 RX ORDER — DEXTROAMPHETAMINE SACCHARATE, AMPHETAMINE ASPARTATE MONOHYDRATE, DEXTROAMPHETAMINE SULFATE AND AMPHETAMINE SULFATE 5; 5; 5; 5 MG/1; MG/1; MG/1; MG/1
20 CAPSULE, EXTENDED RELEASE ORAL DAILY
Qty: 30 CAPSULE | Refills: 0 | Status: SHIPPED | OUTPATIENT
Start: 2025-06-14 | End: 2025-07-14

## 2025-04-15 RX ORDER — DEXTROAMPHETAMINE SACCHARATE, AMPHETAMINE ASPARTATE MONOHYDRATE, DEXTROAMPHETAMINE SULFATE AND AMPHETAMINE SULFATE 5; 5; 5; 5 MG/1; MG/1; MG/1; MG/1
20 CAPSULE, EXTENDED RELEASE ORAL DAILY
Qty: 30 CAPSULE | Refills: 0 | Status: SHIPPED | OUTPATIENT
Start: 2025-05-15 | End: 2025-06-14

## 2025-04-15 RX ORDER — DEXTROAMPHETAMINE SACCHARATE, AMPHETAMINE ASPARTATE MONOHYDRATE, DEXTROAMPHETAMINE SULFATE AND AMPHETAMINE SULFATE 5; 5; 5; 5 MG/1; MG/1; MG/1; MG/1
20 CAPSULE, EXTENDED RELEASE ORAL DAILY
Qty: 30 CAPSULE | Refills: 0 | Status: SHIPPED | OUTPATIENT
Start: 2025-04-15 | End: 2025-05-15

## 2025-04-15 NOTE — TELEPHONE ENCOUNTER
Lilli says Bella needs to change the script fro 1 capsule of 20 mg of adderrall instead of how it is written so that insurance will cover it. Thanks

## 2025-04-18 DIAGNOSIS — F41.9 ANXIETY: ICD-10-CM

## 2025-04-21 RX ORDER — DIAZEPAM 10 MG/1
TABLET ORAL
Qty: 30 TABLET | Refills: 0 | Status: SHIPPED | OUTPATIENT
Start: 2025-04-21 | End: 2025-05-21

## 2025-04-21 RX ORDER — VARENICLINE TARTRATE 1 MG/1
1 TABLET, FILM COATED ORAL 2 TIMES DAILY
Qty: 60 TABLET | Refills: 0 | Status: SHIPPED | OUTPATIENT
Start: 2025-04-21

## 2025-04-28 RX ORDER — GLIPIZIDE 10 MG/1
10 TABLET, FILM COATED, EXTENDED RELEASE ORAL DAILY
Qty: 90 TABLET | Refills: 0 | Status: SHIPPED | OUTPATIENT
Start: 2025-04-28

## 2025-04-30 RX ORDER — SILDENAFIL 100 MG/1
100 TABLET, FILM COATED ORAL PRN
Qty: 6 TABLET | Refills: 1 | Status: SHIPPED | OUTPATIENT
Start: 2025-04-30 | End: 2025-05-10

## 2025-04-30 NOTE — TELEPHONE ENCOUNTER
Medication:   Requested Prescriptions     Pending Prescriptions Disp Refills    sildenafil (VIAGRA) 100 MG tablet [Pharmacy Med Name: Sildenafil Citrate 100 MG Oral Tablet] 6 tablet 0     Sig: TAKE 1 TABLET BY MOUTH AS NEEDED FOR ERECTILE DYSFUNCTION        Last Filled:      Patient Phone Number: [phone] (home)     Last appt: 3/12/25  Next appt: Visit date not found    Last OARRS:       7/2/2019     2:59 PM   RX Monitoring   Periodic Controlled Substance Monitoring Possible medication side effects, risk of tolerance/dependence & alternative treatments discussed.

## 2025-05-12 ENCOUNTER — OFFICE VISIT (OUTPATIENT)
Dept: FAMILY MEDICINE CLINIC | Age: 70
End: 2025-05-12
Payer: MEDICARE

## 2025-05-12 VITALS
OXYGEN SATURATION: 98 % | HEIGHT: 72 IN | SYSTOLIC BLOOD PRESSURE: 120 MMHG | DIASTOLIC BLOOD PRESSURE: 70 MMHG | HEART RATE: 72 BPM | WEIGHT: 198.2 LBS | BODY MASS INDEX: 26.84 KG/M2

## 2025-05-12 DIAGNOSIS — E11.9 TYPE 2 DIABETES MELLITUS WITHOUT COMPLICATION, WITH LONG-TERM CURRENT USE OF INSULIN (HCC): Primary | ICD-10-CM

## 2025-05-12 DIAGNOSIS — E11.9 TYPE 2 DIABETES MELLITUS WITHOUT COMPLICATION, WITHOUT LONG-TERM CURRENT USE OF INSULIN (HCC): ICD-10-CM

## 2025-05-12 DIAGNOSIS — Z79.4 TYPE 2 DIABETES MELLITUS WITHOUT COMPLICATION, WITH LONG-TERM CURRENT USE OF INSULIN (HCC): Primary | ICD-10-CM

## 2025-05-12 DIAGNOSIS — F90.1 ATTENTION DEFICIT HYPERACTIVITY DISORDER (ADHD), PREDOMINANTLY HYPERACTIVE TYPE: ICD-10-CM

## 2025-05-12 LAB
ESTIMATED AVERAGE GLUCOSE: NORMAL
HBA1C MFR BLD: 7.9 %

## 2025-05-12 PROCEDURE — 1123F ACP DISCUSS/DSCN MKR DOCD: CPT | Performed by: NURSE PRACTITIONER

## 2025-05-12 PROCEDURE — 99214 OFFICE O/P EST MOD 30 MIN: CPT | Performed by: NURSE PRACTITIONER

## 2025-05-12 PROCEDURE — 3051F HG A1C>EQUAL 7.0%<8.0%: CPT | Performed by: NURSE PRACTITIONER

## 2025-05-12 PROCEDURE — 3074F SYST BP LT 130 MM HG: CPT | Performed by: NURSE PRACTITIONER

## 2025-05-12 PROCEDURE — G2211 COMPLEX E/M VISIT ADD ON: HCPCS | Performed by: NURSE PRACTITIONER

## 2025-05-12 PROCEDURE — 3078F DIAST BP <80 MM HG: CPT | Performed by: NURSE PRACTITIONER

## 2025-05-12 PROCEDURE — 1159F MED LIST DOCD IN RCRD: CPT | Performed by: NURSE PRACTITIONER

## 2025-05-12 RX ORDER — DEXTROAMPHETAMINE SACCHARATE, AMPHETAMINE ASPARTATE MONOHYDRATE, DEXTROAMPHETAMINE SULFATE AND AMPHETAMINE SULFATE 5; 5; 5; 5 MG/1; MG/1; MG/1; MG/1
20 CAPSULE, EXTENDED RELEASE ORAL DAILY
Qty: 30 CAPSULE | Refills: 0 | Status: SHIPPED | OUTPATIENT
Start: 2025-05-12 | End: 2025-06-11

## 2025-05-12 RX ORDER — BLOOD-GLUCOSE SENSOR
EACH MISCELLANEOUS
Qty: 9 EACH | Refills: 5 | Status: SHIPPED | OUTPATIENT
Start: 2025-05-12

## 2025-05-12 ASSESSMENT — PATIENT HEALTH QUESTIONNAIRE - PHQ9

## 2025-05-12 ASSESSMENT — ENCOUNTER SYMPTOMS
SHORTNESS OF BREATH: 0
DIARRHEA: 0
NAUSEA: 0
BACK PAIN: 0
WHEEZING: 0
RHINORRHEA: 0
BLOOD IN STOOL: 0
ABDOMINAL PAIN: 0
COUGH: 0
VOMITING: 0
EYE REDNESS: 0
EYE ITCHING: 0
SINUS PRESSURE: 0
SORE THROAT: 0
COLOR CHANGE: 0
CHEST TIGHTNESS: 0
CONSTIPATION: 0

## 2025-05-12 NOTE — PROGRESS NOTES
Elliot Perkins (:  1955) is a 69 y.o. male,Established patient, here for evaluation of the following chief complaint(s):  2 Month Follow Up      ASSESSMENT/PLAN:  1. Type 2 diabetes mellitus without complication, with long-term current use of insulin (HCC)  2. Type 2 diabetes mellitus without complication, without long-term current use of insulin (HCC)  3. Attention deficit hyperactivity disorder (ADHD), predominantly hyperactive type  -     amphetamine-dextroamphetamine (ADDERALL XR) 20 MG extended release capsule; Take 1 capsule by mouth daily for 30 days. Max Daily Amount: 20 mg, Disp-30 capsule, R-0Normal      Assessment & Plan  1. Diabetes Mellitus.  - Blood glucose levels have shown significant improvement, with an A1c level of 7.9.  - Current medications include glipizide 10 mg, Lantus 15 units in the morning and 35 units at night. NovoLog has been discontinued.  - A prescription for the Freestyle Marin 2+ sensor will be issued to replace the discontinued Freestyle Marin 2 sensor.  - An A1c test was conducted today.    2. Attention Deficit Hyperactivity Disorder (ADHD).  - Reports a slight improvement in symptoms with the current dosage of Adderall extended release 20 mg, especially while working.  - No significant adverse effects noted from the medication.  - Prescription Drug Monitoring Program was reviewed.  - A refill for Adderall extended release 20 mg will be sent to the pharmacy.    Follow-up  - The patient will follow up in 3 months for a Medicare annual wellness visit.    No follow-ups on file.    SUBJECTIVE/OBJECTIVE:    History of Present Illness  The patient is a 69-year-old male who presents for evaluation of diabetes and ADHD.    He has been making efforts to improve his blood glucose levels, which have shown a decreasing trend, with a recent A1c of 7.9. He is currently on his last Marin 2 sensor and is uncertain about his insurance coverage for this device. He has not yet applied the

## 2025-05-17 DIAGNOSIS — F41.9 ANXIETY: ICD-10-CM

## 2025-05-19 RX ORDER — DIAZEPAM 10 MG/1
TABLET ORAL
Qty: 30 TABLET | Refills: 0 | Status: SHIPPED | OUTPATIENT
Start: 2025-05-19 | End: 2025-06-18

## 2025-05-19 NOTE — TELEPHONE ENCOUNTER
Medication:   Requested Prescriptions     Pending Prescriptions Disp Refills    diazePAM (VALIUM) 10 MG tablet [Pharmacy Med Name: diazePAM 10 MG Oral Tablet] 30 tablet 0     Sig: TAKE 1 TABLET BY MOUTH ONCE DAILY AS NEEDED FOR ANXIETY        Last Filled:      Patient Phone Number: [phone] (home)     Last appt: 5/12/2025   Next appt: 8/12/2025    Last OARRS:       7/2/2019     2:59 PM   RX Monitoring   Periodic Controlled Substance Monitoring Possible medication side effects, risk of tolerance/dependence & alternative treatments discussed.

## 2025-05-21 DIAGNOSIS — Z72.0 TOBACCO ABUSE: Primary | ICD-10-CM

## 2025-05-22 RX ORDER — VARENICLINE TARTRATE 1 MG/1
1 TABLET, FILM COATED ORAL 2 TIMES DAILY
Qty: 60 TABLET | Refills: 0 | Status: SHIPPED | OUTPATIENT
Start: 2025-05-22

## 2025-05-23 ENCOUNTER — TELEPHONE (OUTPATIENT)
Dept: ADMINISTRATIVE | Age: 70
End: 2025-05-23

## 2025-05-23 NOTE — TELEPHONE ENCOUNTER
Submitted PA for Varenicline Tartrate 1MG tablets  Via CMM Key: [id number] STATUS: APPROVED, PA Case: [id number]   Coverage Starts on: 2/21/2025 12:00:00 AM  Coverage Ends on: 8/21/2025 12:00:00 AM    If this requires a response please respond to the pool ( P MHCX P[facility] MEDICATION PRE-AUTH).      Thank you please advise patient.

## 2025-05-24 DIAGNOSIS — E11.9 TYPE 2 DIABETES MELLITUS WITHOUT COMPLICATION, UNSPECIFIED WHETHER LONG TERM INSULIN USE (HCC): ICD-10-CM

## 2025-05-27 RX ORDER — INSULIN GLARGINE 100 [IU]/ML
INJECTION, SOLUTION SUBCUTANEOUS
Qty: 15 ML | Refills: 0 | Status: SHIPPED | OUTPATIENT
Start: 2025-05-27

## 2025-06-03 RX ORDER — IBUPROFEN 600 MG/1
TABLET, FILM COATED ORAL
Qty: 120 TABLET | Refills: 1 | Status: SHIPPED | OUTPATIENT
Start: 2025-06-03

## 2025-06-03 NOTE — TELEPHONE ENCOUNTER
Medication:   Requested Prescriptions     Pending Prescriptions Disp Refills    ibuprofen (ADVIL;MOTRIN) 600 MG tablet [Pharmacy Med Name: Ibuprofen 600 MG Oral Tablet] 120 tablet 0     Sig: TAKE 1 TABLET BY MOUTH EVERY 6 HOURS WITH FOOD AS NEEDED FOR PAIN        Last Filled:      Patient Phone Number: [phone] (home)     Last appt: 5/12/2025   Next appt: 8/12/2025    Last OARRS:       7/2/2019     2:59 PM   RX Monitoring   Periodic Controlled Substance Monitoring Possible medication side effects, risk of tolerance/dependence & alternative treatments discussed.

## 2025-06-11 DIAGNOSIS — F90.1 ATTENTION DEFICIT HYPERACTIVITY DISORDER (ADHD), PREDOMINANTLY HYPERACTIVE TYPE: ICD-10-CM

## 2025-06-11 RX ORDER — DEXTROAMPHETAMINE SACCHARATE, AMPHETAMINE ASPARTATE MONOHYDRATE, DEXTROAMPHETAMINE SULFATE AND AMPHETAMINE SULFATE 5; 5; 5; 5 MG/1; MG/1; MG/1; MG/1
20 CAPSULE, EXTENDED RELEASE ORAL DAILY
Qty: 30 CAPSULE | Refills: 0 | OUTPATIENT
Start: 2025-06-11 | End: 2025-07-11

## 2025-06-11 NOTE — TELEPHONE ENCOUNTER
Patient called requesting refill for Adderall to [facility] [address] [place] [address] - [address] - P [phone] - F [phone]    Doxepin Pregnancy And Lactation Text: This medication is Pregnancy Category C and it isn't known if it is safe during pregnancy. It is also excreted in breast milk and breast feeding isn't recommended.

## 2025-06-11 NOTE — TELEPHONE ENCOUNTER
Medication:   Requested Prescriptions     Pending Prescriptions Disp Refills    amphetamine-dextroamphetamine (ADDERALL XR) 20 MG extended release capsule 30 capsule 0     Sig: Take 1 capsule by mouth daily for 30 days. Max Daily Amount: 20 mg        Last Filled:  5/12/25    Last appt: 5/12/2025   Next appt: 8/12/2025    Last OARRS:       7/2/2019     2:59 PM   RX Monitoring   Periodic Controlled Substance Monitoring Possible medication side effects, risk of tolerance/dependence & alternative treatments discussed.

## 2025-06-11 NOTE — TELEPHONE ENCOUNTER
Medication  amphetamine-dextroamphetamine (ADDERALL XR) 20 MG extended release capsule [[id number]]  amphetamine-dextroamphetamine (ADDERALL XR) 20 MG extended release capsule [[id number]]    Order Details  Start Date: 06/14/25 End Date: 07/14/25 after 30 doses   Written Date: 04/15/25 Expiration Date: 06/14/25   Earliest Fill Date: 06/13/25         Associated Diagnoses: Attention deficit hyperactivity disorder (ADHD), predominantly hyperactive type [F90.1]   Providers    Authorizing Provider: Bella Green APRN - CNP NPI: [id number]   Ordering User: Bella Green APRN - CNP          Pharmacy    [address] [address] [facility] [phone] -  [phone] [address] [address]  Phone: [phone]  Fax: [phone]

## 2025-06-17 DIAGNOSIS — F41.9 ANXIETY: ICD-10-CM

## 2025-06-17 DIAGNOSIS — Z72.0 TOBACCO ABUSE: ICD-10-CM

## 2025-06-17 RX ORDER — DIAZEPAM 10 MG/1
TABLET ORAL
Qty: 30 TABLET | Refills: 0 | Status: SHIPPED | OUTPATIENT
Start: 2025-06-17 | End: 2025-07-17

## 2025-06-17 RX ORDER — DIAZEPAM 10 MG/1
TABLET ORAL
Qty: 30 TABLET | Refills: 0 | OUTPATIENT
Start: 2025-06-17

## 2025-06-17 RX ORDER — VARENICLINE TARTRATE 1 MG/1
1 TABLET, FILM COATED ORAL 2 TIMES DAILY
Qty: 60 TABLET | Refills: 0 | Status: SHIPPED | OUTPATIENT
Start: 2025-06-17

## 2025-06-17 NOTE — TELEPHONE ENCOUNTER
Medication:   Requested Prescriptions     Pending Prescriptions Disp Refills    diazePAM (VALIUM) 10 MG tablet [Pharmacy Med Name: diazePAM 10 MG Oral Tablet] 30 tablet 0     Sig: TAKE 1 TABLET BY MOUTH ONCE DAILY AS NEEDED FOR ANXIETY        Last Filled:      Patient Phone Number: [phone] (home)     Last appt: 5/12/2025   Next appt: 8/12/2025    Last OARRS:       7/2/2019     2:59 PM   RX Monitoring   Periodic Controlled Substance Monitoring Possible medication side effects, risk of tolerance/dependence & alternative treatments discussed.

## 2025-07-08 NOTE — TELEPHONE ENCOUNTER
Medication:   Requested Prescriptions     Pending Prescriptions Disp Refills    FLUoxetine (PROZAC) 20 MG capsule [Pharmacy Med Name: FLUoxetine HCl 20 MG Oral Capsule] 90 capsule 0     Sig: Take 1 capsule by mouth once daily        Last Filled:      Patient Phone Number: [phone] (home)     Last appt: 5/12/2025   Next appt: 8/12/2025    Last OARRS:       7/2/2019     2:59 PM   RX Monitoring   Periodic Controlled Substance Monitoring Possible medication side effects, risk of tolerance/dependence & alternative treatments discussed.

## 2025-07-14 DIAGNOSIS — F90.1 ATTENTION DEFICIT HYPERACTIVITY DISORDER (ADHD), PREDOMINANTLY HYPERACTIVE TYPE: ICD-10-CM

## 2025-07-14 RX ORDER — DEXTROAMPHETAMINE SACCHARATE, AMPHETAMINE ASPARTATE MONOHYDRATE, DEXTROAMPHETAMINE SULFATE AND AMPHETAMINE SULFATE 5; 5; 5; 5 MG/1; MG/1; MG/1; MG/1
20 CAPSULE, EXTENDED RELEASE ORAL DAILY
Qty: 30 CAPSULE | Refills: 0 | Status: SHIPPED | OUTPATIENT
Start: 2025-07-14 | End: 2025-08-13

## 2025-07-14 NOTE — TELEPHONE ENCOUNTER
Medication:   Requested Prescriptions     Pending Prescriptions Disp Refills    amphetamine-dextroamphetamine (ADDERALL XR) 20 MG extended release capsule 30 capsule 0     Sig: Take 1 capsule by mouth daily for 30 days. Max Daily Amount: 20 mg        Last Filled:      Patient Phone Number: [phone] (home)     Last appt: 5/12/2025   Next appt: 8/12/2025    Last OARRS:       7/2/2019     2:59 PM   RX Monitoring   Periodic Controlled Substance Monitoring Possible medication side effects, risk of tolerance/dependence & alternative treatments discussed.

## 2025-07-17 DIAGNOSIS — F41.9 ANXIETY: ICD-10-CM

## 2025-07-21 DIAGNOSIS — Z72.0 TOBACCO ABUSE: ICD-10-CM

## 2025-07-21 RX ORDER — DIAZEPAM 10 MG/1
TABLET ORAL
Qty: 30 TABLET | Refills: 0 | Status: SHIPPED | OUTPATIENT
Start: 2025-07-21 | End: 2025-08-20

## 2025-07-21 RX ORDER — VARENICLINE TARTRATE 1 MG/1
1 TABLET, FILM COATED ORAL 2 TIMES DAILY
Qty: 60 TABLET | Refills: 0 | Status: SHIPPED | OUTPATIENT
Start: 2025-07-21

## 2025-07-21 NOTE — TELEPHONE ENCOUNTER
Medication:   Requested Prescriptions     Pending Prescriptions Disp Refills    varenicline (CHANTIX) 1 MG tablet [Pharmacy Med Name: Varenicline Tartrate 1 MG Oral Tablet] 60 tablet 0     Sig: Take 1 tablet by mouth twice daily     Last Filled:  6/17/25    Last appt: 5/12/2025   Next appt: 8/12/2025    Last OARRS:       7/2/2019     2:59 PM   RX Monitoring   Periodic Controlled Substance Monitoring Possible medication side effects, risk of tolerance/dependence & alternative treatments discussed.

## 2025-07-24 RX ORDER — GLIPIZIDE 10 MG/1
10 TABLET, FILM COATED, EXTENDED RELEASE ORAL DAILY
Qty: 90 TABLET | Refills: 1 | Status: SHIPPED | OUTPATIENT
Start: 2025-07-24

## 2025-07-24 NOTE — TELEPHONE ENCOUNTER
Future Appointments   Date Time Provider Department Center   8/12/2025  9:00 AM Bella Green, APRN - CNP [facility] DEP     LOV 5/12/2025       Requested Prescriptions     Pending Prescriptions Disp Refills    glipiZIDE (GLUCOTROL XL) 10 MG extended release tablet [Pharmacy Med Name: glipiZIDE ER 10 MG Oral Tablet Extended Release 24 Hour] 90 tablet 0     Sig: Take 1 tablet by mouth once daily

## 2025-08-02 DIAGNOSIS — E11.9 TYPE 2 DIABETES MELLITUS WITHOUT COMPLICATION, UNSPECIFIED WHETHER LONG TERM INSULIN USE (HCC): ICD-10-CM

## 2025-08-04 RX ORDER — INSULIN GLARGINE 100 [IU]/ML
INJECTION, SOLUTION SUBCUTANEOUS
Qty: 15 ML | Refills: 0 | Status: SHIPPED | OUTPATIENT
Start: 2025-08-04

## 2025-08-11 DIAGNOSIS — F90.1 ATTENTION DEFICIT HYPERACTIVITY DISORDER (ADHD), PREDOMINANTLY HYPERACTIVE TYPE: ICD-10-CM

## 2025-08-11 RX ORDER — DEXTROAMPHETAMINE SACCHARATE, AMPHETAMINE ASPARTATE MONOHYDRATE, DEXTROAMPHETAMINE SULFATE AND AMPHETAMINE SULFATE 5; 5; 5; 5 MG/1; MG/1; MG/1; MG/1
20 CAPSULE, EXTENDED RELEASE ORAL DAILY
Qty: 30 CAPSULE | Refills: 0 | Status: SHIPPED | OUTPATIENT
Start: 2025-08-11 | End: 2025-09-10

## 2025-08-15 DIAGNOSIS — F41.9 ANXIETY: ICD-10-CM

## 2025-08-18 ENCOUNTER — OFFICE VISIT (OUTPATIENT)
Dept: FAMILY MEDICINE CLINIC | Age: 70
End: 2025-08-18
Payer: MEDICARE

## 2025-08-18 VITALS
BODY MASS INDEX: 26.85 KG/M2 | HEART RATE: 98 BPM | OXYGEN SATURATION: 96 % | WEIGHT: 198 LBS | TEMPERATURE: 98.3 F | SYSTOLIC BLOOD PRESSURE: 116 MMHG | DIASTOLIC BLOOD PRESSURE: 68 MMHG

## 2025-08-18 DIAGNOSIS — F41.9 ANXIETY: ICD-10-CM

## 2025-08-18 DIAGNOSIS — E11.9 TYPE 2 DIABETES MELLITUS WITHOUT COMPLICATION, UNSPECIFIED WHETHER LONG TERM INSULIN USE (HCC): Primary | ICD-10-CM

## 2025-08-18 DIAGNOSIS — J06.9 VIRAL URI: ICD-10-CM

## 2025-08-18 LAB
CHP ED QC CHECK: NORMAL
GLUCOSE BLD-MCNC: 193 MG/DL
HBA1C MFR BLD: 8 %

## 2025-08-18 PROCEDURE — 83036 HEMOGLOBIN GLYCOSYLATED A1C: CPT | Performed by: NURSE PRACTITIONER

## 2025-08-18 PROCEDURE — 82962 GLUCOSE BLOOD TEST: CPT | Performed by: NURSE PRACTITIONER

## 2025-08-18 PROCEDURE — 1159F MED LIST DOCD IN RCRD: CPT | Performed by: NURSE PRACTITIONER

## 2025-08-18 PROCEDURE — 3074F SYST BP LT 130 MM HG: CPT | Performed by: NURSE PRACTITIONER

## 2025-08-18 PROCEDURE — 3052F HG A1C>EQUAL 8.0%<EQUAL 9.0%: CPT | Performed by: NURSE PRACTITIONER

## 2025-08-18 PROCEDURE — 99214 OFFICE O/P EST MOD 30 MIN: CPT | Performed by: NURSE PRACTITIONER

## 2025-08-18 PROCEDURE — 3078F DIAST BP <80 MM HG: CPT | Performed by: NURSE PRACTITIONER

## 2025-08-18 PROCEDURE — 1123F ACP DISCUSS/DSCN MKR DOCD: CPT | Performed by: NURSE PRACTITIONER

## 2025-08-18 RX ORDER — FLUOXETINE 10 MG/1
CAPSULE ORAL
Qty: 90 CAPSULE | Refills: 1 | Status: SHIPPED | OUTPATIENT
Start: 2025-08-18

## 2025-08-18 RX ORDER — IBUPROFEN 600 MG/1
TABLET, FILM COATED ORAL
Qty: 120 TABLET | Refills: 1 | Status: SHIPPED | OUTPATIENT
Start: 2025-08-18

## 2025-08-18 RX ORDER — DIAZEPAM 10 MG/1
TABLET ORAL
Qty: 30 TABLET | Refills: 0 | Status: SHIPPED | OUTPATIENT
Start: 2025-08-18 | End: 2025-09-17

## 2025-08-18 SDOH — ECONOMIC STABILITY: FOOD INSECURITY: WITHIN THE PAST 12 MONTHS, YOU WORRIED THAT YOUR FOOD WOULD RUN OUT BEFORE YOU GOT MONEY TO BUY MORE.: NEVER TRUE

## 2025-08-18 SDOH — ECONOMIC STABILITY: FOOD INSECURITY: WITHIN THE PAST 12 MONTHS, THE FOOD YOU BOUGHT JUST DIDN'T LAST AND YOU DIDN'T HAVE MONEY TO GET MORE.: NEVER TRUE

## 2025-08-18 ASSESSMENT — PATIENT HEALTH QUESTIONNAIRE - PHQ9
SUM OF ALL RESPONSES TO PHQ QUESTIONS 1-9: 0
1. LITTLE INTEREST OR PLEASURE IN DOING THINGS: NOT AT ALL
SUM OF ALL RESPONSES TO PHQ QUESTIONS 1-9: 0
2. FEELING DOWN, DEPRESSED OR HOPELESS: NOT AT ALL

## 2025-08-20 RX ORDER — DIAZEPAM 10 MG/1
TABLET ORAL
Qty: 30 TABLET | Refills: 0 | Status: SHIPPED | OUTPATIENT
Start: 2025-08-20 | End: 2025-09-19

## 2025-08-21 DIAGNOSIS — Z72.0 TOBACCO ABUSE: ICD-10-CM

## 2025-08-25 RX ORDER — VARENICLINE TARTRATE 1 MG/1
1 TABLET, FILM COATED ORAL 2 TIMES DAILY
Qty: 60 TABLET | Refills: 2 | Status: SHIPPED | OUTPATIENT
Start: 2025-08-25

## (undated) DEVICE — CANNULA SAMP CO2 AD GRN 7FT CO2 AND 7FT O2 TBNG UNIV CONN

## (undated) DEVICE — FORCEPS BX L240CM JAW DIA2.4MM ORNG L CAP W/ NDL DISP RAD